# Patient Record
Sex: MALE | Race: WHITE | NOT HISPANIC OR LATINO | Employment: FULL TIME | URBAN - METROPOLITAN AREA
[De-identification: names, ages, dates, MRNs, and addresses within clinical notes are randomized per-mention and may not be internally consistent; named-entity substitution may affect disease eponyms.]

---

## 2017-12-26 ENCOUNTER — CLINICAL SUPPORT (OUTPATIENT)
Dept: OCCUPATIONAL MEDICINE | Facility: CLINIC | Age: 47
End: 2017-12-26

## 2017-12-26 DIAGNOSIS — Z00.00 PHYSICAL EXAM: Primary | ICD-10-CM

## 2017-12-26 LAB
BILIRUB UR QL STRIP: NORMAL
GLUCOSE UR QL STRIP: NORMAL
KETONES UR QL STRIP: NORMAL
LEUKOCYTE ESTERASE UR QL STRIP: NORMAL
PH, POC UA: NORMAL (ref 5–8)
POC BLOOD, URINE: NORMAL
POC NITRATES, URINE: NORMAL
PROT UR QL STRIP: NORMAL
SP GR UR STRIP: NORMAL (ref 1–1.03)
UROBILINOGEN UR STRIP-ACNC: NORMAL (ref 0.3–2.2)

## 2017-12-26 PROCEDURE — 99499 UNLISTED E&M SERVICE: CPT | Mod: S$GLB,,, | Performed by: PREVENTIVE MEDICINE

## 2018-09-11 ENCOUNTER — OFFICE VISIT (OUTPATIENT)
Dept: FAMILY MEDICINE | Facility: CLINIC | Age: 48
End: 2018-09-11
Payer: COMMERCIAL

## 2018-09-11 VITALS
BODY MASS INDEX: 28.73 KG/M2 | DIASTOLIC BLOOD PRESSURE: 90 MMHG | TEMPERATURE: 99 F | WEIGHT: 194 LBS | HEART RATE: 68 BPM | SYSTOLIC BLOOD PRESSURE: 148 MMHG | HEIGHT: 69 IN | OXYGEN SATURATION: 98 % | RESPIRATION RATE: 18 BRPM

## 2018-09-11 DIAGNOSIS — R03.0 ELEVATED BLOOD-PRESSURE READING WITHOUT DIAGNOSIS OF HYPERTENSION: ICD-10-CM

## 2018-09-11 DIAGNOSIS — J01.00 ACUTE NON-RECURRENT MAXILLARY SINUSITIS: Primary | ICD-10-CM

## 2018-09-11 PROCEDURE — 96372 THER/PROPH/DIAG INJ SC/IM: CPT | Mod: S$GLB,,, | Performed by: FAMILY MEDICINE

## 2018-09-11 PROCEDURE — 99999 PR PBB SHADOW E&M-EST. PATIENT-LVL IV: CPT | Mod: PBBFAC,,, | Performed by: FAMILY MEDICINE

## 2018-09-11 PROCEDURE — 3008F BODY MASS INDEX DOCD: CPT | Mod: CPTII,S$GLB,, | Performed by: FAMILY MEDICINE

## 2018-09-11 PROCEDURE — 99213 OFFICE O/P EST LOW 20 MIN: CPT | Mod: 25,S$GLB,, | Performed by: FAMILY MEDICINE

## 2018-09-11 RX ORDER — FLUTICASONE PROPIONATE 50 MCG
1 SPRAY, SUSPENSION (ML) NASAL DAILY
COMMUNITY
End: 2019-08-22

## 2018-09-11 RX ORDER — BETAMETHASONE SODIUM PHOSPHATE AND BETAMETHASONE ACETATE 3; 3 MG/ML; MG/ML
6 INJECTION, SUSPENSION INTRA-ARTICULAR; INTRALESIONAL; INTRAMUSCULAR; SOFT TISSUE
Status: COMPLETED | OUTPATIENT
Start: 2018-09-11 | End: 2018-09-11

## 2018-09-11 RX ORDER — AMOXICILLIN AND CLAVULANATE POTASSIUM 875; 125 MG/1; MG/1
1 TABLET, FILM COATED ORAL EVERY 12 HOURS
Qty: 14 TABLET | Refills: 0 | Status: SHIPPED | OUTPATIENT
Start: 2018-09-11 | End: 2018-09-18

## 2018-09-11 RX ADMIN — BETAMETHASONE SODIUM PHOSPHATE AND BETAMETHASONE ACETATE 6 MG: 3; 3 INJECTION, SUSPENSION INTRA-ARTICULAR; INTRALESIONAL; INTRAMUSCULAR; SOFT TISSUE at 03:09

## 2018-09-11 NOTE — PATIENT INSTRUCTIONS
1. Steroid injection today  2. Blood pressure was high  - normal is <120/70  - hypertension is >/=140/90\  3. Recommend wellness visit for labs and discussion on colon cancer screening

## 2018-09-11 NOTE — PROGRESS NOTES
FAMILY MEDICINE    Patient Active Problem List   Diagnosis    Elevated blood-pressure reading without diagnosis of hypertension       CC:   Chief Complaint   Patient presents with    URI       SUBJECTIVE:  Romeo Couch Jr.   is a 48 y.o. male  - healthy male presents with cough, congestion and sore throat x 1 week. Reports that he has chronic sinus issues and has followed with ENT in the past. Sore throat presented initially but now resolved. +Temp 100 F yesterday. Cough hacking nonproductive. Congestion and bilateral sinus pressure with yellow nasal discharge. Bilateral ear pressure without tinnitus or hearing loss.          ROS: Review of Systems   Constitutional: Positive for fever. Negative for activity change, appetite change, chills and fatigue.   HENT: Positive for congestion, ear pain, rhinorrhea, sinus pressure and sore throat. Negative for ear discharge, facial swelling, hearing loss, mouth sores, nosebleeds, tinnitus and trouble swallowing.    Respiratory: Positive for cough. Negative for chest tightness and shortness of breath.    Cardiovascular: Negative for chest pain and palpitations.   Gastrointestinal: Negative for abdominal pain, diarrhea and nausea.   Musculoskeletal: Negative for myalgias.   Skin: Negative for rash.   Neurological: Positive for headaches. Negative for dizziness and light-headedness.   Psychiatric/Behavioral: Positive for sleep disturbance.       Past Medical History:   Diagnosis Date    Pneumothorax     29 yo       Past Surgical History:   Procedure Laterality Date    right shoulder surgery      right rotator cuff       ALLERGIES: Review of patient's allergies indicates:  No Known Allergies    MEDS:   Current Outpatient Medications:     fluticasone (FLONASE) 50 mcg/actuation nasal spray, 1 spray by Each Nare route once daily., Disp: , Rfl:     amoxicillin-clavulanate 875-125mg (AUGMENTIN) 875-125 mg per tablet, Take 1 tablet by mouth every 12 (twelve) hours. for 7 days,  "Disp: 14 tablet, Rfl: 0  No current facility-administered medications for this visit.     OBJECTIVE:   Vitals:    09/11/18 1513 09/11/18 1555   BP: (!) 140/82 (!) 148/90   BP Location: Left arm    Patient Position: Sitting    BP Method: Large (Manual)    Pulse: 68    Resp: 18    Temp: 98.8 °F (37.1 °C)    SpO2: 98%    Weight: 88 kg (194 lb)    Height: 5' 9" (1.753 m)      Body mass index is 28.65 kg/m².    Physical Exam   Constitutional: No distress.   HENT:   Head: Normocephalic and atraumatic.   Right Ear: Ear canal normal. A middle ear effusion is present.   Left Ear: Tympanic membrane and ear canal normal.   Nose: Mucosal edema and rhinorrhea present. Right sinus exhibits maxillary sinus tenderness and frontal sinus tenderness. Left sinus exhibits maxillary sinus tenderness and frontal sinus tenderness.   Mouth/Throat: Uvula is midline, oropharynx is clear and moist and mucous membranes are normal.   Neck: Neck supple.   Cardiovascular: Normal rate, regular rhythm, normal heart sounds and intact distal pulses. Exam reveals no gallop and no friction rub.   No murmur heard.  Pulmonary/Chest: Effort normal and breath sounds normal. He has no wheezes. He has no rhonchi. He has no rales.   Skin: Skin is warm.         ASSESSMENT:  Problem List Items Addressed This Visit     Elevated blood-pressure reading without diagnosis of hypertension    Current Assessment & Plan     - no prior BP issues  - taking NSAID  - counseling on HTN and BP goals           Other Visit Diagnoses     Acute non-recurrent maxillary sinusitis    -  Primary    Relevant Medications    betamethasone acetate-betamethasone sodium phosphate injection 6 mg (Completed)    amoxicillin-clavulanate 875-125mg (AUGMENTIN) 875-125 mg per tablet          PLAN:   Orders Placed This Encounter    betamethasone acetate-betamethasone sodium phosphate injection 6 mg    amoxicillin-clavulanate 875-125mg (AUGMENTIN) 875-125 mg per tablet     Discussion with pt since " 49 yo. No recent labs or colon cancer screening. Recommend make appointment for wellness exam and discuss that due for Tdap, labs, and colon cancer screening.   Follow-up 1 month for labs and health maintenance recommendations.     Dr. Serenity Devlin D.O.   Family Medicine

## 2018-11-30 ENCOUNTER — OFFICE VISIT (OUTPATIENT)
Dept: FAMILY MEDICINE | Facility: CLINIC | Age: 48
End: 2018-11-30
Payer: COMMERCIAL

## 2018-11-30 VITALS
HEART RATE: 67 BPM | TEMPERATURE: 98 F | HEIGHT: 69 IN | DIASTOLIC BLOOD PRESSURE: 80 MMHG | OXYGEN SATURATION: 98 % | BODY MASS INDEX: 28.66 KG/M2 | WEIGHT: 193.5 LBS | SYSTOLIC BLOOD PRESSURE: 120 MMHG

## 2018-11-30 DIAGNOSIS — R05.9 COUGH: ICD-10-CM

## 2018-11-30 DIAGNOSIS — J30.2 SEASONAL ALLERGIC RHINITIS, UNSPECIFIED TRIGGER: ICD-10-CM

## 2018-11-30 DIAGNOSIS — R03.0 ELEVATED BLOOD PRESSURE READING: ICD-10-CM

## 2018-11-30 DIAGNOSIS — J01.00 ACUTE MAXILLARY SINUSITIS, RECURRENCE NOT SPECIFIED: ICD-10-CM

## 2018-11-30 DIAGNOSIS — R50.9 FEVER, UNSPECIFIED FEVER CAUSE: Primary | ICD-10-CM

## 2018-11-30 PROCEDURE — 3008F BODY MASS INDEX DOCD: CPT | Mod: CPTII,S$GLB,, | Performed by: INTERNAL MEDICINE

## 2018-11-30 PROCEDURE — 99999 PR PBB SHADOW E&M-EST. PATIENT-LVL III: CPT | Mod: PBBFAC,,, | Performed by: INTERNAL MEDICINE

## 2018-11-30 PROCEDURE — 99213 OFFICE O/P EST LOW 20 MIN: CPT | Mod: S$GLB,,, | Performed by: INTERNAL MEDICINE

## 2018-11-30 RX ORDER — AMOXICILLIN AND CLAVULANATE POTASSIUM 875; 125 MG/1; MG/1
1 TABLET, FILM COATED ORAL 2 TIMES DAILY
Qty: 20 TABLET | Refills: 0 | Status: SHIPPED | OUTPATIENT
Start: 2018-11-30 | End: 2018-12-10

## 2018-11-30 NOTE — PROGRESS NOTES
Subjective:       Patient ID: Romeo Couch Jr. is a 48 y.o. male.    Chief Complaint: Sore Throat; Cough; Headache; and Sinusitis     I have reviewed the PMH,  and  for this patient. This is a new patient to me. He is here for evaluation of cold symptoms including cough, sore throat and sinus headaches. He did not take a flu shot. He has been taking flonase once daily for allergies. He reports that he had a fever last night of 100.7. He had a normal temperature with us today. He reports that he gets a sinus infection and cough every year about this time. His symptoms started Tuesday. And have been getting worse.     He saw Dr. Devlin the last time he was here. He had an elevated blood pressure that day. He has been watching it since then and it has been OK. He had taken advil that day and he wonders if it could have raised his BP.       Sinus Problem   This is a new problem. The current episode started in the past 7 days. The problem has been gradually worsening since onset. The maximum temperature recorded prior to his arrival was 100.4 - 100.9 F. The fever has been present for 1 to 2 days. The pain is mild. Associated symptoms include congestion, a hoarse voice, sinus pressure and a sore throat. Pertinent negatives include no chills, coughing, diaphoresis, ear pain, headaches, neck pain, shortness of breath, sneezing or swollen glands. Past treatments include oral decongestants and spray decongestants. The treatment provided mild relief.     Active Ambulatory Problems     Diagnosis Date Noted    Elevated blood-pressure reading without diagnosis of hypertension 09/11/2018    Seasonal allergic rhinitis 11/30/2018    Elevated blood pressure reading 11/30/2018     Resolved Ambulatory Problems     Diagnosis Date Noted    Flu syndrome 12/20/2013    Cough with expectoration 12/20/2013    Acute sinusitis 12/20/2013     Past Medical History:   Diagnosis Date    Pneumothorax      Review of Systems    Constitutional: Negative for activity change, appetite change, chills, diaphoresis, fatigue and fever.   HENT: Positive for congestion, hoarse voice, sinus pressure and sore throat. Negative for drooling, ear discharge, ear pain, nosebleeds, postnasal drip, rhinorrhea, sinus pain, sneezing, trouble swallowing and voice change.    Eyes: Negative for pain, discharge, redness and itching.   Respiratory: Negative for cough, chest tightness, shortness of breath and wheezing.    Cardiovascular: Negative for chest pain and leg swelling.   Gastrointestinal: Negative for abdominal pain, constipation, diarrhea and nausea.   Musculoskeletal: Negative for arthralgias, joint swelling, myalgias and neck pain.   Skin: Negative for pallor and rash.   Neurological: Negative for dizziness, weakness, light-headedness and headaches.   Hematological: Negative for adenopathy. Does not bruise/bleed easily.   Psychiatric/Behavioral: Negative for agitation and sleep disturbance.       Objective:      Physical Exam   Constitutional: He is oriented to person, place, and time.   HENT:   Head: Normocephalic and atraumatic.   Right Ear: External ear normal. Tympanic membrane is not injected, not erythematous and not retracted. No middle ear effusion.   Left Ear: External ear normal. Tympanic membrane is not injected, not erythematous and not retracted.  No middle ear effusion.   Nose: Right sinus exhibits maxillary sinus tenderness. Right sinus exhibits no frontal sinus tenderness. Left sinus exhibits maxillary sinus tenderness. Left sinus exhibits no frontal sinus tenderness.   Mouth/Throat: Posterior oropharyngeal erythema present. No oropharyngeal exudate or posterior oropharyngeal edema.   Eyes: Conjunctivae and EOM are normal. Pupils are equal, round, and reactive to light. Right eye exhibits no discharge. Left eye exhibits no discharge.   Neck: Normal range of motion. Neck supple. No thyromegaly present.   Cardiovascular: Normal rate,  regular rhythm, normal heart sounds and intact distal pulses.   No murmur heard.  Pulmonary/Chest: Effort normal and breath sounds normal. No respiratory distress. He has no wheezes. He has no rales.   Abdominal: Soft. He exhibits no distension.   Musculoskeletal: He exhibits no edema or tenderness.   Lymphadenopathy:     He has cervical adenopathy.   Neurological: He is alert and oriented to person, place, and time.   Skin: Skin is warm and dry. No rash noted. No erythema.   Psychiatric: He has a normal mood and affect. His behavior is normal.       Assessment and Plan:     Problem List Items Addressed This Visit        Cardiac/Vascular    Elevated blood pressure reading - resolved. BP was normal x 2 today.        Other    Seasonal allergic rhinitis - continue flonase. Consider adding claritin or allegra.       Other Visit Diagnoses     Fever, unspecified fever cause    -  Primary - flu test was negative. Probably due to sinus infection.     Relevant Orders    POCT Influenza A/B    Acute maxillary sinusitis, recurrence not specified    - treat with augmentin       Cough    - due to sinus drainage. Continue flonase and treat sinus infection.

## 2018-11-30 NOTE — PATIENT INSTRUCTIONS
You have a sinus infection which is causing your fever. The flu test was negative. I have prescribed Augmentin to treat the infection. You can continue to take theraflu if it helps. Your BP was normal both times we took it today. Continue flonase for allergies and consider adding allegra or claritin.

## 2018-12-10 ENCOUNTER — PATIENT MESSAGE (OUTPATIENT)
Dept: FAMILY MEDICINE | Facility: CLINIC | Age: 48
End: 2018-12-10

## 2019-04-23 ENCOUNTER — OFFICE VISIT (OUTPATIENT)
Dept: INTERNAL MEDICINE | Facility: CLINIC | Age: 49
DRG: 842 | End: 2019-04-23
Payer: COMMERCIAL

## 2019-04-23 ENCOUNTER — DOCUMENTATION ONLY (OUTPATIENT)
Dept: FAMILY MEDICINE | Facility: CLINIC | Age: 49
End: 2019-04-23

## 2019-04-23 ENCOUNTER — HOSPITAL ENCOUNTER (OUTPATIENT)
Dept: RADIOLOGY | Facility: HOSPITAL | Age: 49
Discharge: HOME | End: 2019-04-23
Attending: FAMILY MEDICINE
Payer: COMMERCIAL

## 2019-04-23 VITALS
BODY MASS INDEX: 28.05 KG/M2 | WEIGHT: 189.38 LBS | HEIGHT: 69 IN | TEMPERATURE: 100 F | SYSTOLIC BLOOD PRESSURE: 151 MMHG | DIASTOLIC BLOOD PRESSURE: 62 MMHG | HEART RATE: 91 BPM

## 2019-04-23 DIAGNOSIS — R16.1 SPLENOMEGALY: ICD-10-CM

## 2019-04-23 DIAGNOSIS — Z87.09 HISTORY OF SORE THROAT: ICD-10-CM

## 2019-04-23 DIAGNOSIS — F17.200 SMOKER: ICD-10-CM

## 2019-04-23 DIAGNOSIS — R03.0 ELEVATED BLOOD-PRESSURE READING WITHOUT DIAGNOSIS OF HYPERTENSION: ICD-10-CM

## 2019-04-23 DIAGNOSIS — R10.9 FLANK PAIN: ICD-10-CM

## 2019-04-23 DIAGNOSIS — R50.9 FEVER, UNSPECIFIED FEVER CAUSE: ICD-10-CM

## 2019-04-23 DIAGNOSIS — R10.9 FLANK PAIN: Primary | ICD-10-CM

## 2019-04-23 PROCEDURE — 99215 PR OFFICE/OUTPT VISIT, EST, LEVL V, 40-54 MIN: ICD-10-PCS | Mod: S$GLB,,, | Performed by: FAMILY MEDICINE

## 2019-04-23 PROCEDURE — 74176 CT ABD & PELVIS W/O CONTRAST: CPT | Mod: TC

## 2019-04-23 PROCEDURE — 99215 OFFICE O/P EST HI 40 MIN: CPT | Mod: S$GLB,,, | Performed by: FAMILY MEDICINE

## 2019-04-23 PROCEDURE — 99999 PR PBB SHADOW E&M-EST. PATIENT-LVL III: CPT | Mod: PBBFAC,,, | Performed by: FAMILY MEDICINE

## 2019-04-23 PROCEDURE — 3008F PR BODY MASS INDEX (BMI) DOCUMENTED: ICD-10-PCS | Mod: CPTII,S$GLB,, | Performed by: FAMILY MEDICINE

## 2019-04-23 PROCEDURE — 3008F BODY MASS INDEX DOCD: CPT | Mod: CPTII,S$GLB,, | Performed by: FAMILY MEDICINE

## 2019-04-23 PROCEDURE — 74176 CT ABD & PELVIS W/O CONTRAST: CPT | Mod: 26,,, | Performed by: RADIOLOGY

## 2019-04-23 PROCEDURE — 74176 CT RENAL STONE STUDY ABD PELVIS WO: ICD-10-PCS | Mod: 26,,, | Performed by: RADIOLOGY

## 2019-04-23 PROCEDURE — 99999 PR PBB SHADOW E&M-EST. PATIENT-LVL III: ICD-10-PCS | Mod: PBBFAC,,, | Performed by: FAMILY MEDICINE

## 2019-04-23 RX ORDER — METRONIDAZOLE 500 MG/1
500 TABLET ORAL EVERY 8 HOURS
Qty: 30 TABLET | Refills: 0 | OUTPATIENT
Start: 2019-04-23 | End: 2019-04-24 | Stop reason: HOSPADM

## 2019-04-23 RX ORDER — TRAMADOL HYDROCHLORIDE 50 MG/1
50 TABLET ORAL EVERY 6 HOURS PRN
Qty: 28 TABLET | Refills: 0 | Status: SHIPPED | OUTPATIENT
Start: 2019-04-23 | End: 2019-08-22

## 2019-04-23 RX ORDER — ONDANSETRON 4 MG/1
8 TABLET, ORALLY DISINTEGRATING ORAL EVERY 6 HOURS PRN
Qty: 30 TABLET | Refills: 0 | Status: SHIPPED | OUTPATIENT
Start: 2019-04-23 | End: 2019-08-22

## 2019-04-23 RX ORDER — CIPROFLOXACIN 500 MG/1
500 TABLET ORAL EVERY 12 HOURS
Qty: 20 TABLET | Refills: 0 | OUTPATIENT
Start: 2019-04-23 | End: 2019-04-24 | Stop reason: HOSPADM

## 2019-04-24 ENCOUNTER — TELEPHONE (OUTPATIENT)
Dept: HEMATOLOGY/ONCOLOGY | Facility: CLINIC | Age: 49
End: 2019-04-24

## 2019-04-24 ENCOUNTER — HOSPITAL ENCOUNTER (EMERGENCY)
Facility: HOSPITAL | Age: 49
Discharge: HOME | DRG: 842 | End: 2019-04-24
Attending: EMERGENCY MEDICINE | Admitting: INTERNAL MEDICINE
Payer: COMMERCIAL

## 2019-04-24 VITALS
RESPIRATION RATE: 18 BRPM | OXYGEN SATURATION: 97 % | SYSTOLIC BLOOD PRESSURE: 119 MMHG | HEIGHT: 69 IN | HEART RATE: 70 BPM | BODY MASS INDEX: 27.85 KG/M2 | TEMPERATURE: 99 F | WEIGHT: 188 LBS | DIASTOLIC BLOOD PRESSURE: 73 MMHG

## 2019-04-24 DIAGNOSIS — D72.829 LEUKOCYTOSIS: ICD-10-CM

## 2019-04-24 DIAGNOSIS — D72.829 LEUKOCYTOSIS, UNSPECIFIED TYPE: Primary | ICD-10-CM

## 2019-04-24 DIAGNOSIS — R07.9 CHEST PAIN: ICD-10-CM

## 2019-04-24 DIAGNOSIS — R16.1 SPLENOMEGALY: ICD-10-CM

## 2019-04-24 PROBLEM — F17.200 SMOKER: Status: ACTIVE | Noted: 2019-04-24

## 2019-04-24 PROBLEM — R10.9 LEFT SIDED ABDOMINAL PAIN: Status: ACTIVE | Noted: 2019-04-24

## 2019-04-24 LAB
ABO + RH BLD: NORMAL
ALBUMIN SERPL BCP-MCNC: 3.9 G/DL (ref 3.5–5.2)
ALP SERPL-CCNC: 91 U/L (ref 55–135)
ALT SERPL W/O P-5'-P-CCNC: 29 U/L (ref 10–44)
ANION GAP SERPL CALC-SCNC: 11 MMOL/L (ref 8–16)
ANION GAP SERPL CALC-SCNC: 11 MMOL/L (ref 8–16)
ASCENDING AORTA: 2.91 CM
AST SERPL-CCNC: 33 U/L (ref 10–40)
AV INDEX (PROSTH): 0.84
AV MEAN GRADIENT: 7.41 MMHG
AV PEAK GRADIENT: 14.75 MMHG
AV VALVE AREA: 2.99 CM2
AV VELOCITY RATIO: 0.9
BILIRUB SERPL-MCNC: 0.9 MG/DL (ref 0.1–1)
BLD GP AB SCN CELLS X3 SERPL QL: NORMAL
BSA FOR ECHO PROCEDURE: 2.04 M2
BUN SERPL-MCNC: 9 MG/DL (ref 6–20)
BUN SERPL-MCNC: 9 MG/DL (ref 6–20)
CALCIUM SERPL-MCNC: 10 MG/DL (ref 8.7–10.5)
CALCIUM SERPL-MCNC: 10 MG/DL (ref 8.7–10.5)
CHLORIDE SERPL-SCNC: 104 MMOL/L (ref 95–110)
CHLORIDE SERPL-SCNC: 104 MMOL/L (ref 95–110)
CO2 SERPL-SCNC: 27 MMOL/L (ref 23–29)
CO2 SERPL-SCNC: 27 MMOL/L (ref 23–29)
CREAT SERPL-MCNC: 1.1 MG/DL (ref 0.5–1.4)
CREAT SERPL-MCNC: 1.1 MG/DL (ref 0.5–1.4)
CV ECHO LV RWT: 0.43 CM
DOP CALC AO PEAK VEL: 1.92 M/S
DOP CALC AO VTI: 35.07 CM
DOP CALC LVOT AREA: 3.56 CM2
DOP CALC LVOT DIAMETER: 2.13 CM
DOP CALC LVOT PEAK VEL: 1.72 M/S
DOP CALC LVOT STROKE VOLUME: 104.92 CM3
DOP CALCLVOT PEAK VEL VTI: 29.46 CM
E WAVE DECELERATION TIME: 281.28 MSEC
E/A RATIO: 1.35
E/E' RATIO: 7.46
ECHO LV POSTERIOR WALL: 1.09 CM (ref 0.6–1.1)
ERYTHROCYTE [DISTWIDTH] IN BLOOD BY AUTOMATED COUNT: 18.9 % (ref 11.5–14.5)
EST. GFR  (AFRICAN AMERICAN): >60 ML/MIN/1.73 M^2
EST. GFR  (AFRICAN AMERICAN): >60 ML/MIN/1.73 M^2
EST. GFR  (NON AFRICAN AMERICAN): >60 ML/MIN/1.73 M^2
EST. GFR  (NON AFRICAN AMERICAN): >60 ML/MIN/1.73 M^2
FRACTIONAL SHORTENING: 35 % (ref 28–44)
GLUCOSE SERPL-MCNC: 105 MG/DL (ref 70–110)
GLUCOSE SERPL-MCNC: 105 MG/DL (ref 70–110)
HAV IGM SERPL QL IA: NEGATIVE
HBV CORE AB SERPL QL IA: NEGATIVE
HBV CORE IGM SERPL QL IA: NEGATIVE
HBV SURFACE AG SERPL QL IA: NEGATIVE
HCT VFR BLD AUTO: 28.2 % (ref 40–54)
HCV AB SERPL QL IA: NEGATIVE
HGB BLD-MCNC: 9 G/DL (ref 14–18)
HIV1+2 IGG SERPL QL IA.RAPID: NEGATIVE
INTERVENTRICULAR SEPTUM: 0.81 CM (ref 0.6–1.1)
IVRT: 0.05 MSEC
LA MAJOR: 6.25 CM
LA MINOR: 6.42 CM
LA WIDTH: 5 CM
LACTATE SERPL-SCNC: 0.8 MMOL/L (ref 0.5–2.2)
LEFT ATRIUM SIZE: 3.48 CM
LEFT ATRIUM VOLUME INDEX: 46.6 ML/M2
LEFT ATRIUM VOLUME: 93.68 CM3
LEFT INTERNAL DIMENSION IN SYSTOLE: 3.28 CM (ref 2.1–4)
LEFT VENTRICLE DIASTOLIC VOLUME INDEX: 59.88 ML/M2
LEFT VENTRICLE DIASTOLIC VOLUME: 120.49 ML
LEFT VENTRICLE MASS INDEX: 85.6 G/M2
LEFT VENTRICLE SYSTOLIC VOLUME INDEX: 21.7 ML/M2
LEFT VENTRICLE SYSTOLIC VOLUME: 43.61 ML
LEFT VENTRICULAR INTERNAL DIMENSION IN DIASTOLE: 5.04 CM (ref 3.5–6)
LEFT VENTRICULAR MASS: 172.18 G
LV LATERAL E/E' RATIO: 6.93
LV SEPTAL E/E' RATIO: 8.08
MAGNESIUM SERPL-MCNC: 2.5 MG/DL (ref 1.6–2.6)
MCH RBC QN AUTO: 31.9 PG (ref 27–31)
MCHC RBC AUTO-ENTMCNC: 31.9 G/DL (ref 32–36)
MCV RBC AUTO: 100 FL (ref 82–98)
MV PEAK A VEL: 0.72 M/S
MV PEAK E VEL: 0.97 M/S
PISA TR MAX VEL: 3.06 M/S
PLATELET # BLD AUTO: 141 K/UL (ref 150–350)
PMV BLD AUTO: 9.8 FL (ref 9.2–12.9)
POTASSIUM SERPL-SCNC: 3.1 MMOL/L (ref 3.5–5.1)
POTASSIUM SERPL-SCNC: 3.1 MMOL/L (ref 3.5–5.1)
PROT SERPL-MCNC: 7.6 G/DL (ref 6–8.4)
PULM VEIN S/D RATIO: 1.08
PV PEAK D VEL: 0.78 M/S
PV PEAK S VEL: 0.84 M/S
PV PEAK VELOCITY: 1.39 CM/S
RA MAJOR: 5.75 CM
RA PRESSURE: 8 MMHG
RA WIDTH: 4.2 CM
RBC # BLD AUTO: 2.82 M/UL (ref 4.6–6.2)
RIGHT VENTRICULAR END-DIASTOLIC DIMENSION: 3.7 CM
RV TISSUE DOPPLER FREE WALL SYSTOLIC VELOCITY 1 (APICAL 4 CHAMBER VIEW): 22.21 M/S
SINUS: 3.34 CM
SODIUM SERPL-SCNC: 142 MMOL/L (ref 136–145)
SODIUM SERPL-SCNC: 142 MMOL/L (ref 136–145)
STJ: 2.53 CM
TDI LATERAL: 0.14
TDI SEPTAL: 0.12
TDI: 0.13
TR MAX PG: 37.45 MMHG
TRICUSPID ANNULAR PLANE SYSTOLIC EXCURSION: 3.47 CM
TV REST PULMONARY ARTERY PRESSURE: 45 MMHG
WBC # BLD AUTO: 200.8 K/UL (ref 3.9–12.7)

## 2019-04-24 PROCEDURE — 99223 PR INITIAL HOSPITAL CARE,LEVL III: ICD-10-PCS | Mod: ,,, | Performed by: INTERNAL MEDICINE

## 2019-04-24 PROCEDURE — 93010 EKG 12-LEAD: ICD-10-PCS | Mod: ,,, | Performed by: INTERNAL MEDICINE

## 2019-04-24 PROCEDURE — 86900 BLOOD TYPING SEROLOGIC ABO: CPT

## 2019-04-24 PROCEDURE — 80053 COMPREHEN METABOLIC PANEL: CPT

## 2019-04-24 PROCEDURE — 93010 ELECTROCARDIOGRAM REPORT: CPT | Mod: ,,, | Performed by: INTERNAL MEDICINE

## 2019-04-24 PROCEDURE — 99284 EMERGENCY DEPT VISIT MOD MDM: CPT | Mod: 25

## 2019-04-24 PROCEDURE — 96360 HYDRATION IV INFUSION INIT: CPT

## 2019-04-24 PROCEDURE — 83735 ASSAY OF MAGNESIUM: CPT

## 2019-04-24 PROCEDURE — 93005 ELECTROCARDIOGRAM TRACING: CPT

## 2019-04-24 PROCEDURE — 96361 HYDRATE IV INFUSION ADD-ON: CPT

## 2019-04-24 PROCEDURE — 82955 ASSAY OF G6PD ENZYME: CPT

## 2019-04-24 PROCEDURE — 86703 HIV-1/HIV-2 1 RESULT ANTBDY: CPT

## 2019-04-24 PROCEDURE — 83605 ASSAY OF LACTIC ACID: CPT

## 2019-04-24 PROCEDURE — 80074 ACUTE HEPATITIS PANEL: CPT

## 2019-04-24 PROCEDURE — 99223 1ST HOSP IP/OBS HIGH 75: CPT | Mod: ,,, | Performed by: INTERNAL MEDICINE

## 2019-04-24 PROCEDURE — 99285 EMERGENCY DEPT VISIT HI MDM: CPT | Mod: ,,, | Performed by: EMERGENCY MEDICINE

## 2019-04-24 PROCEDURE — 99285 PR EMERGENCY DEPT VISIT,LEVEL V: ICD-10-PCS | Mod: ,,, | Performed by: EMERGENCY MEDICINE

## 2019-04-24 PROCEDURE — 25000003 PHARM REV CODE 250: Performed by: EMERGENCY MEDICINE

## 2019-04-24 PROCEDURE — 86704 HEP B CORE ANTIBODY TOTAL: CPT

## 2019-04-24 PROCEDURE — 85027 COMPLETE CBC AUTOMATED: CPT

## 2019-04-24 PROCEDURE — 25000003 PHARM REV CODE 250: Performed by: NURSE PRACTITIONER

## 2019-04-24 PROCEDURE — 12000002 HC ACUTE/MED SURGE SEMI-PRIVATE ROOM

## 2019-04-24 RX ORDER — ALLOPURINOL 300 MG/1
300 TABLET ORAL DAILY
Qty: 30 TABLET | Refills: 3 | Status: SHIPPED | OUTPATIENT
Start: 2019-04-25 | End: 2019-04-24

## 2019-04-24 RX ORDER — TRAMADOL HYDROCHLORIDE 50 MG/1
50 TABLET ORAL EVERY 6 HOURS PRN
Status: DISCONTINUED | OUTPATIENT
Start: 2019-04-24 | End: 2019-04-24 | Stop reason: HOSPADM

## 2019-04-24 RX ORDER — HYDROXYUREA 500 MG/1
3000 CAPSULE ORAL DAILY
Status: DISCONTINUED | OUTPATIENT
Start: 2019-04-24 | End: 2019-04-24

## 2019-04-24 RX ORDER — HYDROXYUREA 500 MG/1
1000 CAPSULE ORAL DAILY
Status: DISCONTINUED | OUTPATIENT
Start: 2019-04-25 | End: 2019-04-24 | Stop reason: HOSPADM

## 2019-04-24 RX ORDER — SODIUM CHLORIDE 9 MG/ML
500 INJECTION, SOLUTION INTRAVENOUS
Status: COMPLETED | OUTPATIENT
Start: 2019-04-24 | End: 2019-04-24

## 2019-04-24 RX ORDER — SODIUM CHLORIDE 9 MG/ML
INJECTION, SOLUTION INTRAVENOUS CONTINUOUS
Status: DISCONTINUED | OUTPATIENT
Start: 2019-04-24 | End: 2019-04-24 | Stop reason: HOSPADM

## 2019-04-24 RX ORDER — ONDANSETRON 8 MG/1
8 TABLET, ORALLY DISINTEGRATING ORAL EVERY 6 HOURS PRN
Status: DISCONTINUED | OUTPATIENT
Start: 2019-04-24 | End: 2019-04-24 | Stop reason: HOSPADM

## 2019-04-24 RX ORDER — HYDROXYUREA 500 MG/1
1000 CAPSULE ORAL DAILY
Qty: 60 CAPSULE | Refills: 0 | Status: SHIPPED | OUTPATIENT
Start: 2019-04-25 | End: 2019-04-24

## 2019-04-24 RX ORDER — IBUPROFEN 200 MG
16 TABLET ORAL
Status: DISCONTINUED | OUTPATIENT
Start: 2019-04-24 | End: 2019-04-24 | Stop reason: HOSPADM

## 2019-04-24 RX ORDER — FLUTICASONE PROPIONATE 50 MCG
1 SPRAY, SUSPENSION (ML) NASAL DAILY
Status: DISCONTINUED | OUTPATIENT
Start: 2019-04-24 | End: 2019-04-24 | Stop reason: HOSPADM

## 2019-04-24 RX ORDER — GLUCAGON 1 MG
1 KIT INJECTION
Status: DISCONTINUED | OUTPATIENT
Start: 2019-04-24 | End: 2019-04-24 | Stop reason: HOSPADM

## 2019-04-24 RX ORDER — ALLOPURINOL 300 MG/1
300 TABLET ORAL DAILY
Qty: 30 TABLET | Refills: 3 | Status: SHIPPED | OUTPATIENT
Start: 2019-04-25 | End: 2019-06-11

## 2019-04-24 RX ORDER — ALLOPURINOL 100 MG/1
300 TABLET ORAL DAILY
Status: DISCONTINUED | OUTPATIENT
Start: 2019-04-24 | End: 2019-04-24 | Stop reason: HOSPADM

## 2019-04-24 RX ORDER — IBUPROFEN 200 MG
24 TABLET ORAL
Status: DISCONTINUED | OUTPATIENT
Start: 2019-04-24 | End: 2019-04-24 | Stop reason: HOSPADM

## 2019-04-24 RX ORDER — SODIUM CHLORIDE 0.9 % (FLUSH) 0.9 %
10 SYRINGE (ML) INJECTION
Status: DISCONTINUED | OUTPATIENT
Start: 2019-04-24 | End: 2019-04-24 | Stop reason: HOSPADM

## 2019-04-24 RX ORDER — HYDROXYUREA 500 MG/1
1000 CAPSULE ORAL DAILY
Qty: 60 CAPSULE | Refills: 3 | Status: SHIPPED | OUTPATIENT
Start: 2019-04-25 | End: 2019-06-11

## 2019-04-24 RX ORDER — LORAZEPAM 2 MG/ML
1 INJECTION INTRAMUSCULAR ONCE AS NEEDED
Status: DISCONTINUED | OUTPATIENT
Start: 2019-04-24 | End: 2019-04-24 | Stop reason: HOSPADM

## 2019-04-24 RX ORDER — POTASSIUM CHLORIDE 20 MEQ/1
40 TABLET, EXTENDED RELEASE ORAL ONCE
Status: COMPLETED | OUTPATIENT
Start: 2019-04-24 | End: 2019-04-24

## 2019-04-24 RX ADMIN — SODIUM CHLORIDE 500 ML: 0.9 INJECTION, SOLUTION INTRAVENOUS at 03:04

## 2019-04-24 RX ADMIN — SODIUM CHLORIDE 500 ML: 0.9 INJECTION, SOLUTION INTRAVENOUS at 07:04

## 2019-04-24 RX ADMIN — HYDROXYUREA 3000 MG: 500 CAPSULE ORAL at 11:04

## 2019-04-24 RX ADMIN — POTASSIUM CHLORIDE 40 MEQ: 1500 TABLET, EXTENDED RELEASE ORAL at 03:04

## 2019-04-24 RX ADMIN — ALLOPURINOL 300 MG: 100 TABLET ORAL at 10:04

## 2019-04-24 RX ADMIN — SODIUM CHLORIDE: 0.9 INJECTION, SOLUTION INTRAVENOUS at 09:04

## 2019-04-24 NOTE — H&P (VIEW-ONLY)
[facility]  Hematology  Bone Marrow Transplant  H&P    Subjective:     Principal Problem: <principal problem not specified>    HPI: Mr. Couch is a 49-y-o male patient who presented to [facility] ED over night. He reported that he went to his PCP with left abdominal pain and temp of 100.4 yesterday and was sent by PCP for CT. CT showed splenomegaly. Pt was instructed to go to ED. Went to [place] ED and was found to have leukocytosis. Wanted to transfer patient to [facility], but no beds were available. Patient reportedly left [place] ED AMA and came to [facility] ED over night. WBC 238K. Suspicious for acute leukemia. He denies any aches, chills, n/v/d, constipation, chest pain, bleeding or bruising. C/o SOB on exertion and mild, non-productive cough. Patient states that he has lost 8 lbs in the last few weeks. Further states that he has had recurrent ear infection over the last few weeks. States that they would respond to abx but would recur upon abx completion. Will admit inpatient and plan for BMBx when patient arrives to floor.     Patient information was obtained from patient, spouse/SO and ER records.     Oncology History: WBC 238K. Suspicious for acute leukemia. Will plan for BMBx.       (Not in a hospital admission)    Patient has no known allergies.     Past Medical History:   Diagnosis Date    Leukocytosis     Pneumothorax     27 yo     Past Surgical History:   Procedure Laterality Date    right shoulder surgery      right rotator cuff     Family History     Problem Relation (Age of Onset)    Cancer Mother        Tobacco Use    Smoking status: Never Smoker    Smokeless tobacco: Current User     Types: Snuff   Substance and Sexual Activity    Alcohol use: Yes    Drug use: No    Sexual activity: Yes     Partners: Female       Review of Systems   Constitutional: Positive for fever. Negative for activity change, appetite change, chills and fatigue.   HENT: Negative for congestion, mouth sores,  nosebleeds, rhinorrhea, sinus pressure, sinus pain, sneezing and sore throat.    Eyes: Negative for photophobia, pain, discharge, redness, itching and visual disturbance.   Respiratory: Positive for cough and shortness of breath. Negative for chest tightness and wheezing.         Reports non-productive cough and SOB on exertion   Cardiovascular: Negative for chest pain, palpitations and leg swelling.   Gastrointestinal: Negative for abdominal distention, abdominal pain, blood in stool, constipation, diarrhea, nausea and vomiting.   Endocrine: Negative for cold intolerance, heat intolerance, polydipsia, polyphagia and polyuria.   Genitourinary: Negative for difficulty urinating, frequency, hematuria and urgency.   Musculoskeletal: Negative for arthralgias, back pain, myalgias, neck pain and neck stiffness.   Skin: Negative for pallor, rash and wound.   Allergic/Immunologic: Negative for environmental allergies, food allergies and immunocompromised state.   Neurological: Negative for dizziness, tremors, seizures, syncope, speech difficulty, weakness, light-headedness, numbness and headaches.   Hematological: Negative for adenopathy. Does not bruise/bleed easily.   Psychiatric/Behavioral: Negative for agitation, confusion, hallucinations, sleep disturbance and suicidal ideas. The patient is not nervous/anxious.      Objective:     Vital Signs (Most Recent):  Temp: 98.9 °F (37.2 °C) (04/24/19 0825)  Pulse: 78 (04/24/19 0825)  Resp: 18 (04/24/19 0825)  BP: (!) 149/79 (04/24/19 0825)  SpO2: 99 % (04/24/19 0825) Vital Signs (24h Range):  Temp:  [98.3 °F (36.8 °C)-100.4 °F (38 °C)] 98.9 °F (37.2 °C)  Pulse:  [78-98] 78  Resp:  [16-18] 18  SpO2:  [99 %-100 %] 99 %  BP: (142-151)/(62-83) 149/79     Weight: 85.3 kg (188 lb)  Body mass index is 27.76 kg/m².  Body surface area is 2.04 meters squared.    ECOG SCORE         [unfilled]    Lines/Drains/Airways     Peripheral Intravenous Line                 Peripheral IV - Single  Lumen 04/24/19 0659 18 G Left Antecubital less than 1 day                Physical Exam   Constitutional: He is oriented to person, place, and time. He appears well-developed and well-nourished.   HENT:   Head: Normocephalic and atraumatic.   Mouth/Throat: No oropharyngeal exudate.   Eyes: Pupils are equal, round, and reactive to light. Conjunctivae are normal. Right eye exhibits no discharge. Left eye exhibits no discharge.   Neck: Normal range of motion. Neck supple.   Cardiovascular: Normal rate, regular rhythm, normal heart sounds and intact distal pulses.   No murmur heard.  Pulmonary/Chest: Effort normal and breath sounds normal. No respiratory distress. He has no wheezes. He has no rales.   Abdominal: Soft. Bowel sounds are normal. He exhibits no distension. There is no tenderness.   Musculoskeletal: Normal range of motion. He exhibits no edema or deformity.   Neurological: He is alert and oriented to person, place, and time.   Skin: Skin is warm and dry. No rash noted. No erythema.   Psychiatric: He has a normal mood and affect. His behavior is normal. Judgment and thought content normal.       Significant Labs:   CBC:   Recent Labs   Lab 04/23/19  1513 04/23/19  2305   .70* 238.80*   HGB 9.3* 9.6*   HCT 30.8* 30.1*   * 152    and CMP:   Recent Labs   Lab 04/23/19  1513 04/23/19  2305 04/24/19  0657    141 142  142   K 4.0 3.3* 3.1*  3.1*    101 104  104   CO2 29 29 27  27   GLU 77 107 105  105   BUN 10 12 9  9   CREATININE 1.1 1.11 1.1  1.1   CALCIUM 10.0 9.7 10.0  10.0   PROT 7.5 8.0 7.6   ALBUMIN 4.1 4.5 3.9   BILITOT 0.8 0.7 0.9   ALKPHOS 89 82 91   AST 40 47* 33   ALT 32 37 29   ANIONGAP 9 11 11  11   EGFRNONAA >60.0 >60.0 >60.0  >60.0       Diagnostic Results:  I have reviewed all pertinent imaging results/findings within the past 24 hours.    Assessment/Plan:     Left sided abdominal pain  - saw PCP for left sided abdominal pain on 4/23/19. CT performed and showed  splenomegaly    Leukocytosis  - was sent to [place] ED 4/23/19 due to splenomegaly on CT. WBC found to be 220K.  - WBC 238K upon presentation to [facility] ED  - started on IVF, hydrea 3000 mg BID, and allopurinol 300 mg daily  - will check TLS and DIC labs BID  - planning BMBx once patient is in room (currently in bautista bed in ED)  - will check flow cytometry via peripheral blood   - will check echo, Hep panel, rapid HIV, and G6PD        VTE Risk Mitigation (From admission, onward)        Ordered     IP VTE LOW RISK PATIENT  Once      04/24/19 0846     Place EDUARDO hose  Until discontinued      04/24/19 0847     Place sequential compression device  Until discontinued      04/24/19 0847          Disposition: Inpatient    Carmen Thapa, NP  Bone Marrow Transplant  Hematology  [facility]

## 2019-04-24 NOTE — HOSPITAL COURSE
04/24/2019: Patient presented to ED with c/o left abdominal pain (splenomegaly on CT). WBC 238K. Suspicious for leukemia. Checked flow and suggestive of chronic leukemia. Will discharge and plan for BMBx in OR tomorrow and clinic follow-up with Dr. Rucker.

## 2019-04-24 NOTE — PROGRESS NOTES
Internal Medicine On-Call Note:    I received a call from the lab regarding a critical value for WBC of 220.  Chart review shows that he was seen in clinic today for fever and flank pain and CT showed splenomegaly.  H/H and plat lets down as well.      Patient still having belly pain.  Feeling fatigued.  No CP, SOB, palpitations.  The diff is not back nor is his CMP with regard for TLS evaluation.      I asked the patient to go to the [facility] ER since he lives in that area for evaluation for IVF and ensuring that he is not in fact having TLS given this marked leukocytosis.  He expressed evaluation.     I called and gave report to the ER physician.  Patient will likely need LDH, uric acid drawn as well to complete his TLS evaluation.     Moreno Beaulieu

## 2019-04-24 NOTE — SUBJECTIVE & OBJECTIVE
Patient information was obtained from patient, spouse/SO and ER records.     Oncology History: WBC 238K. Suspicious for acute leukemia. Will plan for BMBx.       (Not in a hospital admission)    Patient has no known allergies.     Past Medical History:   Diagnosis Date    Leukocytosis     Pneumothorax     27 yo     Past Surgical History:   Procedure Laterality Date    right shoulder surgery      right rotator cuff     Family History     Problem Relation (Age of Onset)    Cancer Mother        Tobacco Use    Smoking status: Never Smoker    Smokeless tobacco: Current User     Types: Snuff   Substance and Sexual Activity    Alcohol use: Yes    Drug use: No    Sexual activity: Yes     Partners: Female       Review of Systems   Constitutional: Positive for fever. Negative for activity change, appetite change, chills and fatigue.   HENT: Negative for congestion, mouth sores, nosebleeds, rhinorrhea, sinus pressure, sinus pain, sneezing and sore throat.    Eyes: Negative for photophobia, pain, discharge, redness, itching and visual disturbance.   Respiratory: Positive for cough and shortness of breath. Negative for chest tightness and wheezing.         Reports non-productive cough and SOB on exertion   Cardiovascular: Negative for chest pain, palpitations and leg swelling.   Gastrointestinal: Negative for abdominal distention, abdominal pain, blood in stool, constipation, diarrhea, nausea and vomiting.   Endocrine: Negative for cold intolerance, heat intolerance, polydipsia, polyphagia and polyuria.   Genitourinary: Negative for difficulty urinating, frequency, hematuria and urgency.   Musculoskeletal: Negative for arthralgias, back pain, myalgias, neck pain and neck stiffness.   Skin: Negative for pallor, rash and wound.   Allergic/Immunologic: Negative for environmental allergies, food allergies and immunocompromised state.   Neurological: Negative for dizziness, tremors, seizures, syncope, speech difficulty,  weakness, light-headedness, numbness and headaches.   Hematological: Negative for adenopathy. Does not bruise/bleed easily.   Psychiatric/Behavioral: Negative for agitation, confusion, hallucinations, sleep disturbance and suicidal ideas. The patient is not nervous/anxious.      Objective:     Vital Signs (Most Recent):  Temp: 98.9 °F (37.2 °C) (04/24/19 0825)  Pulse: 78 (04/24/19 0825)  Resp: 18 (04/24/19 0825)  BP: (!) 149/79 (04/24/19 0825)  SpO2: 99 % (04/24/19 0825) Vital Signs (24h Range):  Temp:  [98.3 °F (36.8 °C)-100.4 °F (38 °C)] 98.9 °F (37.2 °C)  Pulse:  [78-98] 78  Resp:  [16-18] 18  SpO2:  [99 %-100 %] 99 %  BP: (142-151)/(62-83) 149/79     Weight: 85.3 kg (188 lb)  Body mass index is 27.76 kg/m².  Body surface area is 2.04 meters squared.    ECOG SCORE         [unfilled]    Lines/Drains/Airways     Peripheral Intravenous Line                 Peripheral IV - Single Lumen 04/24/19 0659 18 G Left Antecubital less than 1 day                Physical Exam   Constitutional: He is oriented to person, place, and time. He appears well-developed and well-nourished.   HENT:   Head: Normocephalic and atraumatic.   Mouth/Throat: No oropharyngeal exudate.   Eyes: Pupils are equal, round, and reactive to light. Conjunctivae are normal. Right eye exhibits no discharge. Left eye exhibits no discharge.   Neck: Normal range of motion. Neck supple.   Cardiovascular: Normal rate, regular rhythm, normal heart sounds and intact distal pulses.   No murmur heard.  Pulmonary/Chest: Effort normal and breath sounds normal. No respiratory distress. He has no wheezes. He has no rales.   Abdominal: Soft. Bowel sounds are normal. He exhibits no distension. There is no tenderness.   Musculoskeletal: Normal range of motion. He exhibits no edema or deformity.   Neurological: He is alert and oriented to person, place, and time.   Skin: Skin is warm and dry. No rash noted. No erythema.   Psychiatric: He has a normal mood and affect. His  behavior is normal. Judgment and thought content normal.       Significant Labs:   CBC:   Recent Labs   Lab 04/23/19  1513 04/23/19  2305   .70* 238.80*   HGB 9.3* 9.6*   HCT 30.8* 30.1*   * 152    and CMP:   Recent Labs   Lab 04/23/19  1513 04/23/19  2305 04/24/19  0657    141 142  142   K 4.0 3.3* 3.1*  3.1*    101 104  104   CO2 29 29 27  27   GLU 77 107 105  105   BUN 10 12 9  9   CREATININE 1.1 1.11 1.1  1.1   CALCIUM 10.0 9.7 10.0  10.0   PROT 7.5 8.0 7.6   ALBUMIN 4.1 4.5 3.9   BILITOT 0.8 0.7 0.9   ALKPHOS 89 82 91   AST 40 47* 33   ALT 32 37 29   ANIONGAP 9 11 11  11   EGFRNONAA >60.0 >60.0 >60.0  >60.0       Diagnostic Results:  I have reviewed all pertinent imaging results/findings within the past 24 hours.

## 2019-04-24 NOTE — ED NOTES
Oncology bedside discussing POC; SHERIE, bone biopsy and labs.  Pt remains NPO with IV fluids runnning

## 2019-04-24 NOTE — H&P
[facility]  Hematology  Bone Marrow Transplant  H&P    Subjective:     Principal Problem: <principal problem not specified>    HPI: Mr. Couch is a 49-y-o male patient who presented to [facility] ED over night. He reported that he went to his PCP with left abdominal pain and temp of 100.4 yesterday and was sent by PCP for CT. CT showed splenomegaly. Pt was instructed to go to ED. Went to [place] ED and was found to have leukocytosis. Wanted to transfer patient to [facility], but no beds were available. Patient reportedly left [place] ED AMA and came to [facility] ED over night. WBC 238K. Suspicious for acute leukemia. He denies any aches, chills, n/v/d, constipation, chest pain, bleeding or bruising. C/o SOB on exertion and mild, non-productive cough. Patient states that he has lost 8 lbs in the last few weeks. Further states that he has had recurrent ear infection over the last few weeks. States that they would respond to abx but would recur upon abx completion. Will admit inpatient and plan for BMBx when patient arrives to floor.     Patient information was obtained from patient, spouse/SO and ER records.     Oncology History: WBC 238K. Suspicious for acute leukemia. Will plan for BMBx.       (Not in a hospital admission)    Patient has no known allergies.     Past Medical History:   Diagnosis Date    Leukocytosis     Pneumothorax     29 yo     Past Surgical History:   Procedure Laterality Date    right shoulder surgery      right rotator cuff     Family History     Problem Relation (Age of Onset)    Cancer Mother        Tobacco Use    Smoking status: Never Smoker    Smokeless tobacco: Current User     Types: Snuff   Substance and Sexual Activity    Alcohol use: Yes    Drug use: No    Sexual activity: Yes     Partners: Female       Review of Systems   Constitutional: Positive for fever. Negative for activity change, appetite change, chills and fatigue.   HENT: Negative for congestion, mouth sores,  nosebleeds, rhinorrhea, sinus pressure, sinus pain, sneezing and sore throat.    Eyes: Negative for photophobia, pain, discharge, redness, itching and visual disturbance.   Respiratory: Positive for cough and shortness of breath. Negative for chest tightness and wheezing.         Reports non-productive cough and SOB on exertion   Cardiovascular: Negative for chest pain, palpitations and leg swelling.   Gastrointestinal: Negative for abdominal distention, abdominal pain, blood in stool, constipation, diarrhea, nausea and vomiting.   Endocrine: Negative for cold intolerance, heat intolerance, polydipsia, polyphagia and polyuria.   Genitourinary: Negative for difficulty urinating, frequency, hematuria and urgency.   Musculoskeletal: Negative for arthralgias, back pain, myalgias, neck pain and neck stiffness.   Skin: Negative for pallor, rash and wound.   Allergic/Immunologic: Negative for environmental allergies, food allergies and immunocompromised state.   Neurological: Negative for dizziness, tremors, seizures, syncope, speech difficulty, weakness, light-headedness, numbness and headaches.   Hematological: Negative for adenopathy. Does not bruise/bleed easily.   Psychiatric/Behavioral: Negative for agitation, confusion, hallucinations, sleep disturbance and suicidal ideas. The patient is not nervous/anxious.      Objective:     Vital Signs (Most Recent):  Temp: 98.9 °F (37.2 °C) (04/24/19 0825)  Pulse: 78 (04/24/19 0825)  Resp: 18 (04/24/19 0825)  BP: (!) 149/79 (04/24/19 0825)  SpO2: 99 % (04/24/19 0825) Vital Signs (24h Range):  Temp:  [98.3 °F (36.8 °C)-100.4 °F (38 °C)] 98.9 °F (37.2 °C)  Pulse:  [78-98] 78  Resp:  [16-18] 18  SpO2:  [99 %-100 %] 99 %  BP: (142-151)/(62-83) 149/79     Weight: 85.3 kg (188 lb)  Body mass index is 27.76 kg/m².  Body surface area is 2.04 meters squared.    ECOG SCORE         [unfilled]    Lines/Drains/Airways     Peripheral Intravenous Line                 Peripheral IV - Single  Lumen 04/24/19 0659 18 G Left Antecubital less than 1 day                Physical Exam   Constitutional: He is oriented to person, place, and time. He appears well-developed and well-nourished.   HENT:   Head: Normocephalic and atraumatic.   Mouth/Throat: No oropharyngeal exudate.   Eyes: Pupils are equal, round, and reactive to light. Conjunctivae are normal. Right eye exhibits no discharge. Left eye exhibits no discharge.   Neck: Normal range of motion. Neck supple.   Cardiovascular: Normal rate, regular rhythm, normal heart sounds and intact distal pulses.   No murmur heard.  Pulmonary/Chest: Effort normal and breath sounds normal. No respiratory distress. He has no wheezes. He has no rales.   Abdominal: Soft. Bowel sounds are normal. He exhibits no distension. There is no tenderness.   Musculoskeletal: Normal range of motion. He exhibits no edema or deformity.   Neurological: He is alert and oriented to person, place, and time.   Skin: Skin is warm and dry. No rash noted. No erythema.   Psychiatric: He has a normal mood and affect. His behavior is normal. Judgment and thought content normal.       Significant Labs:   CBC:   Recent Labs   Lab 04/23/19  1513 04/23/19  2305   .70* 238.80*   HGB 9.3* 9.6*   HCT 30.8* 30.1*   * 152    and CMP:   Recent Labs   Lab 04/23/19  1513 04/23/19  2305 04/24/19  0657    141 142  142   K 4.0 3.3* 3.1*  3.1*    101 104  104   CO2 29 29 27  27   GLU 77 107 105  105   BUN 10 12 9  9   CREATININE 1.1 1.11 1.1  1.1   CALCIUM 10.0 9.7 10.0  10.0   PROT 7.5 8.0 7.6   ALBUMIN 4.1 4.5 3.9   BILITOT 0.8 0.7 0.9   ALKPHOS 89 82 91   AST 40 47* 33   ALT 32 37 29   ANIONGAP 9 11 11  11   EGFRNONAA >60.0 >60.0 >60.0  >60.0       Diagnostic Results:  I have reviewed all pertinent imaging results/findings within the past 24 hours.    Assessment/Plan:     Left sided abdominal pain  - saw PCP for left sided abdominal pain on 4/23/19. CT performed and showed  splenomegaly    Leukocytosis  - was sent to [place] ED 4/23/19 due to splenomegaly on CT. WBC found to be 220K.  - WBC 238K upon presentation to [facility] ED  - started on IVF, hydrea 3000 mg BID, and allopurinol 300 mg daily  - will check TLS and DIC labs BID  - planning BMBx once patient is in room (currently in bautista bed in ED)  - will check flow cytometry via peripheral blood   - will check echo, Hep panel, rapid HIV, and G6PD        VTE Risk Mitigation (From admission, onward)        Ordered     IP VTE LOW RISK PATIENT  Once      04/24/19 0846     Place EDUARDO hose  Until discontinued      04/24/19 0847     Place sequential compression device  Until discontinued      04/24/19 0847          Disposition: Inpatient    Carmen Thapa, NP  Bone Marrow Transplant  Hematology  [facility]

## 2019-04-24 NOTE — ED TRIAGE NOTES
Wife at bedside.      Pain:  pt denies any pain at this time.      Psychosocial:  Patient is calm and cooperative.  Patients insight and judgement are appropriate to situation.  Appears clean, well maintained, with clothing appropriate to environment.  No evidence of delusions, hallucinations, or psychosis.     Neuro:  Eyes open spontaneously.  Awake, alert, oriented x 4.  Speech clear and appropriate.  Tolerating saliva secretions well.  Able to follow commands, demonstrating ability to actively and appropriately communicate within context of current conversation.  Symmetrical facial muscles.  Moving all extremities well with no noted weakness.  Adequate muscle tone present.    Movement is purposeful.       Airway:  Bilateral chest rise and fall.  RR regular and non-labored.  Air entry patent with and clear x 5 lobes of the lungs.  No crepitus or subcutaneous emphysema noted on palpation.       Circulatory:  Skin warm, dry, and pink.  Apical and radial pulses strong and regular.  Capillary refill/skin blanching less than 3 seconds to distal of 4 extremities.     Abdomen:  Abdomen soft and non-distended.  Positive normo-active bowel sounds x 4 quadrants.       Urinary: Denies pressure, frequency, urgency, odor or pain.     Extremities:  No redness, heat, deformity, or pain.     Skin:  Intact with no bruising/discolorations noted.

## 2019-04-24 NOTE — ASSESSMENT & PLAN NOTE
- was sent to [place] ED 4/23/19 due to splenomegaly on CT. WBC found to be 220K.  - WBC 238K upon presentation to [facility] ED  - started on IVF, hydrea 3000 mg BID, and allopurinol 300 mg daily  - will check TLS and DIC labs BID  - planning BMBx once patient is in room (currently in bautista bed in ED)  - will check flow cytometry via peripheral blood   - will check echo, Hep panel, rapid HIV, and G6PD

## 2019-04-24 NOTE — HPI
Mr. Couch is a 49-y-o male patient who presented to [facility] ED over night. He reported that he went to his PCP with left abdominal pain and temp of 100.4 yesterday and was sent by PCP for CT. CT showed splenomegaly. Pt was instructed to go to ED. Went to [place] ED and was found to have leukocytosis. Wanted to transfer patient to [facility], but no beds were available. Patient reportedly left [place] ED AMA and came to [facility] ED over night. WBC 238K. Suspicious for acute leukemia. He denies any aches, chills, n/v/d, constipation, chest pain, bleeding or bruising. C/o SOB on exertion and mild, non-productive cough. Patient states that he has lost 8 lbs in the last few weeks. Further states that he has had recurrent ear infection over the last few weeks. States that they would respond to abx but would recur upon abx completion. Will admit inpatient and plan for BMBx when patient arrives to floor.

## 2019-04-24 NOTE — PROGRESS NOTES
Subjective:   Patient ID: Romeo Couch Jr. is a 49 y.o. male.    Chief Complaint: Abdominal Pain (left side) and Shoulder Pain (left)      HPI  48 yo with sudden onset of LUQ pain and left shoulder pain. Has 100.4 temp today but denies sensation of fevers or chills. No headache. No sore throat today but had uri symptoms inc a sore throat about a week today. 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 Denies nausea, vomiting or diarrhea. Denies chest pain or dyspnea or abd pain.  No ho mono. No ho cancer.    Patient queried and denies any further complaints.        ALLERGIES AND MEDICATIONS: updated and reviewed.  Review of patient's allergies indicates:  No Known Allergies  No current facility-administered medications for this visit.     Current Outpatient Medications:     ciprofloxacin HCl (CIPRO) 500 MG tablet, Take 1 tablet (500 mg total) by mouth every 12 (twelve) hours., Disp: 20 tablet, Rfl: 0    fluticasone (FLONASE) 50 mcg/actuation nasal spray, 1 spray by Each Nare route once daily., Disp: , Rfl:     metroNIDAZOLE (FLAGYL) 500 MG tablet, Take 1 tablet (500 mg total) by mouth every 8 (eight) hours., Disp: 30 tablet, Rfl: 0    ondansetron (ZOFRAN-ODT) 4 MG TbDL, Dissolve 2 tablets (8 mg total) by mouth every 6 (six) hours as needed., Disp: 30 tablet, Rfl: 0    traMADol (ULTRAM) 50 mg tablet, Take 1 tablet (50 mg total) by mouth every 6 (six) hours as needed for Pain., Disp: 28 tablet, Rfl: 0    Facility-Administered Medications Ordered in Other Visits:     0.9%  NaCl infusion, , Intravenous, Continuous, Carmen Thapa NP, Last Rate: 100 mL/hr at 04/24/19 0949    allopurinol tablet 300 mg, 300 mg, Oral, Daily, Carmen Thapa NP, 300 mg at 04/24/19 1026    dextrose 50% injection 12.5 g, 12.5 g, Intravenous, PRN, Carmen Thapa NP    dextrose 50% injection 25 g, 25 g, Intravenous, PRN, Carmen A. Alanis, NP    fluticasone 50 mcg/actuation nasal  spray 50 mcg, 1 spray, Each Nare, Daily, Carmen Thapa NP    glucagon (human recombinant) injection 1 mg, 1 mg, Intramuscular, PRN, Carmen Thapa NP    glucose chewable tablet 16 g, 16 g, Oral, PRN, Carmen Thapa NP    glucose chewable tablet 24 g, 24 g, Oral, PRN, Carmen Thapa NP    hydroxyurea capsule 3,000 mg, 3,000 mg, Oral, Daily, Carmen Thapa NP    ondansetron disintegrating tablet 8 mg, 8 mg, Oral, Q6H PRN, Carmen Thapa NP    sodium chloride 0.9% flush 10 mL, 10 mL, Intravenous, PRN, Carmen Thapa NP    traMADol tablet 50 mg, 50 mg, Oral, Q6H PRN, Carmen Thapa NP    Review of Systems   Constitutional: Positive for fatigue. Negative for activity change, appetite change, chills, diaphoresis, fever and unexpected weight change.   HENT: Positive for sore throat (none today). Negative for congestion, dental problem, drooling, ear discharge, ear pain, facial swelling, hearing loss, mouth sores, nosebleeds, postnasal drip, rhinorrhea, sinus pressure, sinus pain, sneezing, tinnitus, trouble swallowing and voice change.    Eyes: Negative for photophobia, pain, discharge, redness, itching and visual disturbance.   Respiratory: Negative for cough, chest tightness, shortness of breath and wheezing.    Cardiovascular: Negative for chest pain, palpitations and leg swelling.   Gastrointestinal: Negative for abdominal distention, abdominal pain, anal bleeding, blood in stool, constipation, diarrhea, nausea, rectal pain and vomiting.   Endocrine: Negative for cold intolerance, heat intolerance, polydipsia, polyphagia and polyuria.   Genitourinary: Negative for difficulty urinating, dysuria and flank pain.   Musculoskeletal: Negative for arthralgias, back pain, joint swelling, myalgias and neck pain.   Skin: Negative for rash.   Neurological: Negative for dizziness, tremors, seizures, syncope, speech difficulty, weakness, light-headedness, numbness and headaches.   Psychiatric/Behavioral: Negative  "for behavioral problems, confusion, decreased concentration, dysphoric mood, sleep disturbance and suicidal ideas. The patient is not nervous/anxious and is not hyperactive.        Objective:     Vitals:    04/23/19 1416   BP: (!) 151/62   Pulse: 91   Temp: (!) 100.4 °F (38 °C)   TempSrc: Oral   Weight: 85.9 kg (189 lb 6 oz)   Height: 5' 9" (1.753 m)   PainSc:   8     Body mass index is 27.97 kg/m².    Physical Exam   Constitutional: He is oriented to person, place, and time. He appears well-developed and well-nourished. He is cooperative. He does not have a sickly appearance. No distress.   HENT:   Head: Normocephalic and atraumatic.   Right Ear: Hearing, tympanic membrane, external ear and ear canal normal. No tenderness.   Left Ear: Hearing, tympanic membrane, external ear and ear canal normal. No tenderness.   Nose: Nose normal.   Mouth/Throat: Oropharynx is clear and moist.   Eyes: Pupils are equal, round, and reactive to light. Conjunctivae and lids are normal. Right eye exhibits no discharge. Left eye exhibits no discharge. Right conjunctiva is not injected. Left conjunctiva is not injected. No scleral icterus. Right eye exhibits normal extraocular motion. Left eye exhibits normal extraocular motion.   Neck: Normal range of motion. Neck supple. No JVD present. Carotid bruit is not present. No tracheal deviation and no edema present. No thyromegaly present.   Cardiovascular: Normal rate, regular rhythm, normal heart sounds and normal pulses. Exam reveals no friction rub.   No murmur heard.  Pulmonary/Chest: Effort normal and breath sounds normal. No accessory muscle usage. No respiratory distress. He has no wheezes. He has no rhonchi. He has no rales.   Abdominal: Soft. Bowel sounds are normal. He exhibits no distension, no abdominal bruit, no ascites, no pulsatile midline mass and no mass. There is splenomegaly. There is no hepatomegaly. There is no tenderness. There is no rebound, no guarding, no CVA " tenderness, no tenderness at McBurney's point and negative Andres's sign. No hernia.       Musculoskeletal: He exhibits no edema.   Lymphadenopathy:        Head (right side): No submandibular, no preauricular and no posterior auricular adenopathy present.        Head (left side): No submandibular, no preauricular and no posterior auricular adenopathy present.     He has no cervical adenopathy.   Neurological: He is alert and oriented to person, place, and time. GCS eye subscore is 4. GCS verbal subscore is 5. GCS motor subscore is 6.   Skin: Skin is warm and dry. No ecchymosis and no rash noted. Rash is not maculopapular and not urticarial. He is not diaphoretic. No cyanosis or erythema. Nails show no clubbing.   Psychiatric: He has a normal mood and affect. His speech is normal and behavior is normal. Thought content normal. His mood appears not anxious. His affect is not angry and not inappropriate. He does not exhibit a depressed mood.   Nursing note and vitals reviewed.      Assessment and Plan:   Romeo was seen today for abdominal pain and shoulder pain.    Diagnoses and all orders for this visit:    Flank pain  -     CBC auto differential; Future  -     Comprehensive metabolic panel; Future  -     CT Renal Stone Study ABD Pelvis WO; Future  -     Urinalysis; Future  -     Urine culture; Future    Splenomegaly    Fever, unspecified fever cause  -     CBC auto differential; Future  -     Comprehensive metabolic panel; Future  -     Urinalysis; Future  -     Urine culture; Future    History of sore throat    Elevated blood-pressure reading without diagnosis of hypertension    Smoker    Other orders  -     ciprofloxacin HCl (CIPRO) 500 MG tablet; Take 1 tablet (500 mg total) by mouth every 12 (twelve) hours.  -     metroNIDAZOLE (FLAGYL) 500 MG tablet; Take 1 tablet (500 mg total) by mouth every 8 (eight) hours.  -     traMADol (ULTRAM) 50 mg tablet; Take 1 tablet (50 mg total) by mouth every 6 (six) hours as  needed for Pain.  -     ondansetron (ZOFRAN-ODT) 4 MG TbDL; Dissolve 2 tablets (8 mg total) by mouth every 6 (six) hours as needed.      Unusual findings given LUQ pain, splenomegaly and the pain radiating to the left shoulder.   Poss mononucleosis or other, more serious findings possible. Stat ct and labs.      //////////////////////////////////////    [place] of ct results and CBC indicating WBC > 200 in am of 4/24. Pt has already been sent to er by on-call MD and has been admitted to hospital which is appropriate.    Time spent in the evaluation and management of this patient exceeded 60min and greater than 50% of this time was in face-to-face education regarding diagnoses, medications, plan, and follow-up.    No follow-ups on file.    THIS NOTE WILL BE SHARED WITH THE PATIENT.

## 2019-04-24 NOTE — ED TRIAGE NOTES
Pt to the ED with complaints of left upper and lower quadrant abdominal pain that began yesterday with associated fever. Pt went to PCP and had CT scan done and was sent to ER and went to [facility] ER last night. Was told he had possible luekemia and was going to be admitted to oncology but no beds were available to be transferred here so he went home and decided to come back this morning for further workup and admission.    Patient identifiers verified and correct for Romeo Couch.  LOC: The patient is awake, alert and aware of environment with an appropriate affect, the patient is oriented x 3 and speaking appropriately.   APPEARANCE: Patient appears comfortable and in no acute distress, patient is clean and well groomed.  SKIN: The skin is warm and dry, color consistent with ethnicity, patient has normal skin turgor and moist mucus membranes, skin intact, no breakdown or bruising noted.   MUSCULOSKELETAL: Patient moving all extremities spontaneously, no swelling noted.  RESPIRATORY: Airway is open and patent, respirations are spontaneous, patient has a normal effort and rate, no accessory muscle use noted, pt placed on continuous pulse ox with O2 sats noted at 97% on room air.  CARDIAC: Pt placed on cardiac monitor. Patient has a normal rate and regular rhythm, no edema noted, capillary refill < 3 seconds.   GASTRO: Soft but tender to palpation to epigastic and left upper and lower quadrants, no distention noted. Pt states bowel movements have been regular. Pain to left upper quadrant.  : Pt denies any pain but reports frequency with urination.   NEURO: Pt opens eyes spontaneously, behavior appropriate to situation, follows commands, facial expression symmetrical, bilateral hand grasp equal and even, purposeful motor response noted, normal sensation in all extremities when touched with a finger.

## 2019-04-24 NOTE — ED PROVIDER NOTES
Encounter Date: 4/24/2019    SCRIBE #1 NOTE: I, Floyd Jose, am scribing for, and in the presence of,  Jose Oh MD. I have scribed the entire note.       History     Chief Complaint   Patient presents with    Leukemia     Pt reports he was was experiencing L sided pain, went to PCP and had CT scan done and was told to go to [facility] ER and went to [facility] last night. Was told he had possible luekemia and was going to be admitted to oncology but no beds were available to be transferred here so he went home and decided to come back this morning for further workup and admission     49 y.o. male patient who presents to the Emergency Department for abnormal labs with onset 1 day ago. Pt reports he woke up yesterday with left-sided abd pain, went to his PCP, had CT done, and was told to go to ER. Pt went to [facility] and was told he had possible leukemia and was going to be transferred to [facility], but they had no beds. Pt left [facility] and was told to report to [facility] this morning. Pt presents today with left-sided abd pain, fever, cough, night sweats, and SOB. He states the abd pain radiates from his left abd up to his left shoulder. Pt denies any N/V/D, chills, constipation, hematochezia, dysuria, hematuria, urinary frequency, difficulty urinating, urine decreased, and all other sxs at this time. A ten point review of systems was completed and is negative except as documented above. Patient denies any other acute medical complaint. The patient's available PMH, PSH, social history, medications, allergies, and triage vital signs were reviewed just prior to their medical evaluation.    The history is provided by the patient.     Review of patient's allergies indicates:  No Known Allergies  Past Medical History:   Diagnosis Date    Leukocytosis     Pneumothorax     29 yo     Past Surgical History:   Procedure Laterality Date    right shoulder surgery       right rotator cuff     Family History   Problem Relation Age of Onset    Cancer Mother         Ovarian     Heart disease Neg Hx      Social History     Tobacco Use    Smoking status: Never Smoker    Smokeless tobacco: Current User     Types: Snuff   Substance Use Topics    Alcohol use: Yes    Drug use: No     Review of Systems   Constitutional: Positive for diaphoresis and fever. Negative for chills.   HENT: Negative for sore throat.    Eyes: Negative for visual disturbance.   Respiratory: Positive for cough and shortness of breath.    Cardiovascular: Negative for chest pain.   Gastrointestinal: Positive for abdominal pain (left-sided, radiates into left shoulder). Negative for diarrhea, nausea and vomiting.   Genitourinary: Negative for dysuria.   Musculoskeletal: Negative for neck pain.   Skin: Negative for rash.   Allergic/Immunologic: Negative for immunocompromised state.   Neurological: Negative for syncope.   All other systems reviewed and are negative.      Physical Exam     Initial Vitals [04/24/19 0629]   BP Pulse Resp Temp SpO2   (!) 143/72 86 18 99.2 °F (37.3 °C) 100 %      MAP       --         Physical Exam    Nursing note and vitals reviewed.  Constitutional: He appears well-developed and well-nourished. He is not diaphoretic. No distress.   HENT:   Head: Normocephalic and atraumatic.   Mouth/Throat: Oropharynx is clear and moist.   Eyes: Conjunctivae are normal. Right eye exhibits no discharge. Left eye exhibits no discharge.   Neck: Normal range of motion. Neck supple.   Cardiovascular: Normal rate, regular rhythm and normal heart sounds. Exam reveals no gallop and no friction rub.    No murmur heard.  Pulmonary/Chest: Breath sounds normal. No respiratory distress. He has no wheezes. He has no rhonchi. He has no rales.   Abdominal: Soft. He exhibits no distension and no mass. There is splenomegaly. There is tenderness (LUQ TTP). There is no rebound and no guarding.   Musculoskeletal: Normal  range of motion. He exhibits no edema or tenderness.   Neurological: He is alert and oriented to person, place, and time. GCS score is 15. GCS eye subscore is 4. GCS verbal subscore is 5. GCS motor subscore is 6.   Skin: Skin is warm and dry. No rash noted. No erythema.   Psychiatric: He has a normal mood and affect. His behavior is normal. Judgment and thought content normal.         ED Course   Procedures  Labs Reviewed   BASIC METABOLIC PANEL - Abnormal; Notable for the following components:       Result Value    Potassium 3.1 (*)     All other components within normal limits   COMPREHENSIVE METABOLIC PANEL - Abnormal; Notable for the following components:    Potassium 3.1 (*)     All other components within normal limits   CBC WITHOUT DIFFERENTIAL - Abnormal; Notable for the following components:    .80 (*)     RBC 2.82 (*)     Hemoglobin 9.0 (*)     Hematocrit 28.2 (*)      (*)     MCH 31.9 (*)     MCHC 31.9 (*)     RDW 18.9 (*)     Platelets 141 (*)     All other components within normal limits    Narrative:       WBC critical result(s) called and verbal readback obtained from   Mar Rodas RN, 04/24/2019 11:12   LACTIC ACID, PLASMA   MAGNESIUM   HEPATITIS B CORE ANTIBODY, TOTAL   HEPATITIS PANEL, ACUTE   RAPID HIV   FLOW CYTOMETRY ANALYSIS (PERIPHERAL BLOOD)   G6PD,QUANTITATIVE   TYPE & SCREEN   TISSUE SPECIMEN TO PATHOLOGY, BONE MARROW ASPIRATION/BIOPSY PROCEDURE     EKG Readings: (Independently Interpreted)   Initial Reading: No STEMI. Rhythm: Normal Sinus Rhythm. Heart Rate: 85. Ectopy: No Ectopy. Conduction: RBBB (incomplete). T Waves Flipped: III and V2.   No hypertrophy.     ECG Results          EKG 12-lead (Final result)  Result time 04/24/19 20:21:05    Final result by Interface, Lab In [facility] (04/24/19 20:21:05)                 Narrative:    Test Reason : R07.9,    Vent. Rate : 067 BPM     Atrial Rate : 067 BPM     P-R Int : 126 ms          QRS Dur : 098 ms      QT Int : 424 ms        P-R-T Axes : 044 060 033 degrees     QTc Int : 448 ms    Normal sinus rhythm  Right ventricular conduction delay  Borderline Abnormal ECG  When compared with ECG of 24-APR-2019 06:50,  Nonspecific T wave abnormality has replaced inverted T waves in Inferior  leads  Confirmed by Lurdes Li MD ([id number]) on 4/24/2019 8:20:54 PM    Referred By: LUMA SINGH           Confirmed By:Lurdes Li MD                             EKG 12-lead (Final result)  Result time 04/24/19 20:52:39    Final result by Interface, Lab In [facility] (04/24/19 20:52:39)                 Narrative:    Test Reason : D72.829,    Vent. Rate : 085 BPM     Atrial Rate : 085 BPM     P-R Int : 130 ms          QRS Dur : 106 ms      QT Int : 390 ms       P-R-T Axes : 067 061 -03 degrees     QTc Int : 464 ms    Normal sinus rhythm  Incomplete right bundle branch block  Nonspecific ST and/or T wave abnormalities  Abnormal ECG  No previous ECGs available  Confirmed by SHANEL GOODE MD ([id number]) on 4/24/2019 8:52:35 PM    Referred By: LUMA SINGH           Confirmed By:SHANEL GOODE MD                            Imaging Results    None          Medical Decision Making:   History:   Old Medical Records: I decided to obtain old medical records.  Independently Interpreted Test(s):   I have ordered and independently interpreted EKG Reading(s) - see prior notes  Clinical Tests:   Lab Tests: Reviewed and Ordered  Radiological Study: Ordered and Reviewed  Medical Tests: Reviewed and Ordered  ED Management:  49-year-old male presents with very high leukocytosis.  Vitals unremarkable. Patient had a fever yesterday.  Physical exam with splenomegaly and left upper quadrant pain. CT from yesterday confirms.  Concern for a new leukemia.  Discussed with BMT who will admit.  Other:   I have discussed this case with another health care provider.       <> Summary of the Discussion: BMT  BMT            Scribe Attestation:   Scribe #1: I performed the above scribed service and the  documentation accurately describes the services I performed. I attest to the accuracy of the note.               Clinical Impression:       ICD-10-CM ICD-9-CM   1. Leukocytosis, unspecified type D72.829 288.60   2. Splenomegaly R16.1 789.2   3. Chest pain R07.9 786.50   4. Leukocytosis D72.829 288.60         Disposition:   Disposition: Admitted  Condition: Stable                        Jose Oh MD  04/25/19 0557

## 2019-04-24 NOTE — DISCHARGE SUMMARY
[facility]  Hematology  Bone Marrow Transplant  Discharge Summary      Patient Name: Romeo Couch Jr.  MRN: [id number]  Admission Date: 4/24/2019  Hospital Length of Stay: 0 days  Discharge Date and Time: 4/24/2019  5:03 PM  Attending Physician: Catalina Rucker MD   Discharging Provider: Carmen Thapa NP  Primary Care Provider: Matty Catherine MD    HPI: Mr. Couch is a 49-y-o male patient who presented to [facility] ED over night. He reported that he went to his PCP with left abdominal pain and temp of 100.4 yesterday and was sent by PCP for CT. CT showed splenomegaly. Pt was instructed to go to ED. Went to [place] ED and was found to have leukocytosis. Wanted to transfer patient to [facility], but no beds were available. Patient reportedly left [place] ED AMA and came to [facility] ED over night. WBC 238K. Suspicious for acute leukemia. He denies any aches, chills, n/v/d, constipation, chest pain, bleeding or bruising. C/o SOB on exertion and mild, non-productive cough. Patient states that he has lost 8 lbs in the last few weeks. Further states that he has had recurrent ear infection over the last few weeks. States that they would respond to abx but would recur upon abx completion. Will admit inpatient and plan for BMBx when patient arrives to floor.     * No surgery found *     Hospital Course: Patient presented to ED on 4/24/19 with c/o left abdominal pain (splenomegaly on CT). WBC 238K. Otherwise asymptomatic. Diff suggestive of CML. Discharged home on hydrea 1000 mg BID and allopurinol 300 mg daily. Plan for BMBx in OR tomorrow and clinic follow-up with Dr. RuckerPatient presented to     Left sided abdominal pain  - saw PCP for left sided abdominal pain on 4/23/19. CT performed and showed splenomegaly     Leukocytosis  - was sent to [place] ED 4/23/19 due to splenomegaly on CT. WBC found to be 220K.  - WBC 238K upon presentation to [facility] ED  - started on IVF, hydrea 3000 mg BID, and allopurinol 300  mg daily  - will check TLS and DIC labs BID  - planning BMBx once patient is in room (currently in bautista bed in ED)  - will check flow cytometry via peripheral blood   - will check echo, Hep panel, rapid HIV, and G6PD    Consults (From admission, onward)        Status Ordering Provider     Inpatient consult to Oncology  Once     Provider:  (Not yet assigned)    Acknowledged DARÍO JACKSON          Significant Diagnostic Studies: Labs:   CMP   Recent Labs   Lab 04/23/19  1513 04/23/19  2305 04/24/19  0657    141 142  142   K 4.0 3.3* 3.1*  3.1*    101 104  104   CO2 29 29 27  27   GLU 77 107 105  105   BUN 10 12 9  9   CREATININE 1.1 1.11 1.1  1.1   CALCIUM 10.0 9.7 10.0  10.0   PROT 7.5 8.0 7.6   ALBUMIN 4.1 4.5 3.9   BILITOT 0.8 0.7 0.9   ALKPHOS 89 82 91   AST 40 47* 33   ALT 32 37 29   ANIONGAP 9 11 11  11   ESTGFRAFRICA >60.0 >60.0 >60.0  >60.0   EGFRNONAA >60.0 >60.0 >60.0  >60.0    and CBC   Recent Labs   Lab 04/23/19  1513 04/23/19  2305 04/24/19  0952   .70* 238.80* 200.80*   HGB 9.3* 9.6* 9.0*   HCT 30.8* 30.1* 28.2*   * 152 141*       Pending Diagnostic Studies:     Procedure Component Value Units Date/Time    Flow Cytometry Analysis (Peripheral Blood) [[id number]] Collected:  04/24/19 0952    Order Status:  Sent Lab Status:  In process Updated:  04/24/19 1451    Specimen:  Blood     G6PD,Quantitative [[id number]] Collected:  04/24/19 0952    Order Status:  Sent Lab Status:  In process Updated:  04/24/19 1059    Specimen:  Blood         Final Active Diagnoses:    Diagnosis Date Noted POA    PRINCIPAL PROBLEM:  Leukocytosis [D72.829] 04/24/2019 Yes    Left sided abdominal pain [R10.9] 04/24/2019 Yes      Problems Resolved During this Admission:      Discharged Condition: stable    Disposition: Home or Self Care    Follow Up:    Patient Instructions:      CBC W/ AUTO DIFFERENTIAL   Standing Status: Future Standing Exp. Date: 06/22/20     Comprehensive metabolic panel    Standing Status: Future Standing Exp. Date: 06/22/20     Magnesium   Standing Status: Future Standing Exp. Date: 06/22/20     PHOSPHORUS   Standing Status: Future Standing Exp. Date: 06/22/20     Diet Adult Regular     Notify your health care provider if you experience any of the following:  temperature >100.4     Notify your health care provider if you experience any of the following:  persistent nausea and vomiting or diarrhea     Notify your health care provider if you experience any of the following:  severe uncontrolled pain     Notify your health care provider if you experience any of the following:  difficulty breathing or increased cough     Notify your health care provider if you experience any of the following:  severe persistent headache     Notify your health care provider if you experience any of the following:  persistent dizziness, light-headedness, or visual disturbances     Notify your health care provider if you experience any of the following:  increased confusion or weakness     Type & Screen   Standing Status: Future Standing Exp. Date: 06/22/20     Activity as tolerated     Medications:  Reconciled Home Medications:      Medication List      START taking these medications    allopurinol 300 MG tablet  Commonly known as:  ZYLOPRIM  Take 1 tablet (300 mg total) by mouth once daily.  Start taking on:  4/25/2019     hydroxyurea 500 mg Cap  Commonly known as:  HYDREA  Take 2 capsules (1,000 mg total) by mouth once daily.  Start taking on:  4/25/2019        CONTINUE taking these medications    fluticasone 50 mcg/actuation nasal spray  Commonly known as:  FLONASE  1 spray by Each Nare route once daily.     ondansetron 4 MG Tbdl  Commonly known as:  ZOFRAN-ODT  Dissolve 2 tablets (8 mg total) by mouth every 6 (six) hours as needed.     traMADol 50 mg tablet  Commonly known as:  ULTRAM  Take 1 tablet (50 mg total) by mouth every 6 (six) hours as needed for Pain.        STOP taking these medications     ciprofloxacin HCl 500 MG tablet  Commonly known as:  CIPRO     metroNIDAZOLE 500 MG tablet  Commonly known as:  ISUARA Thapa, NP  Bone Marrow Transplant  [facility]

## 2019-04-25 ENCOUNTER — ANESTHESIA EVENT (OUTPATIENT)
Dept: SURGERY | Facility: HOSPITAL | Age: 49
End: 2019-04-25
Payer: COMMERCIAL

## 2019-04-25 ENCOUNTER — ANESTHESIA (OUTPATIENT)
Dept: SURGERY | Facility: HOSPITAL | Age: 49
End: 2019-04-25
Payer: COMMERCIAL

## 2019-04-25 ENCOUNTER — HOSPITAL ENCOUNTER (OUTPATIENT)
Facility: HOSPITAL | Age: 49
Discharge: HOME | End: 2019-04-25
Attending: INTERNAL MEDICINE | Admitting: INTERNAL MEDICINE
Payer: COMMERCIAL

## 2019-04-25 VITALS
WEIGHT: 189 LBS | HEIGHT: 69 IN | HEART RATE: 60 BPM | DIASTOLIC BLOOD PRESSURE: 59 MMHG | OXYGEN SATURATION: 98 % | BODY MASS INDEX: 27.99 KG/M2 | RESPIRATION RATE: 16 BRPM | SYSTOLIC BLOOD PRESSURE: 104 MMHG | TEMPERATURE: 98 F

## 2019-04-25 DIAGNOSIS — Z12.11 COLON CANCER SCREENING: ICD-10-CM

## 2019-04-25 DIAGNOSIS — C92.10 CML (CHRONIC MYELOCYTIC LEUKEMIA): ICD-10-CM

## 2019-04-25 LAB — BONE MARROW WRIGHT STAIN COMMENT: NORMAL

## 2019-04-25 PROCEDURE — 81207 BCR/ABL1 GENE MINOR BP: CPT

## 2019-04-25 PROCEDURE — 88237 TISSUE CULTURE BONE MARROW: CPT

## 2019-04-25 PROCEDURE — 88341 IMHCHEM/IMCYTCHM EA ADD ANTB: CPT | Mod: 26,59,, | Performed by: PATHOLOGY

## 2019-04-25 PROCEDURE — 88313 SPECIAL STAINS GROUP 2: CPT

## 2019-04-25 PROCEDURE — 88305 TISSUE EXAM BY PATHOLOGIST: CPT | Mod: 26,,, | Performed by: PATHOLOGY

## 2019-04-25 PROCEDURE — 36000705 HC OR TIME LEV I EA ADD 15 MIN: Performed by: INTERNAL MEDICINE

## 2019-04-25 PROCEDURE — 38222 PR BONE MARROW BIOPSY(IES) W/ASPIRATION(S); DIAGNOSTIC: ICD-10-PCS | Mod: LT,,, | Performed by: NURSE PRACTITIONER

## 2019-04-25 PROCEDURE — 88341 TISSUE SPECIMEN TO PATHOLOGY, BONE MARROW ASPIRATION/BIOPSY PROCEDURE: ICD-10-PCS | Mod: 26,59,, | Performed by: PATHOLOGY

## 2019-04-25 PROCEDURE — 88189 PR  FLOWCYTOMETRY/READ, 16 & > MARKERS: ICD-10-PCS | Mod: ,,, | Performed by: PATHOLOGY

## 2019-04-25 PROCEDURE — 88185 FLOWCYTOMETRY/TC ADD-ON: CPT | Mod: 59 | Performed by: PATHOLOGY

## 2019-04-25 PROCEDURE — 38222 DX BONE MARROW BX & ASPIR: CPT | Mod: LT,,, | Performed by: NURSE PRACTITIONER

## 2019-04-25 PROCEDURE — 88342 TISSUE SPECIMEN TO PATHOLOGY, BONE MARROW ASPIRATION/BIOPSY PROCEDURE: ICD-10-PCS | Mod: 26,59,, | Performed by: PATHOLOGY

## 2019-04-25 PROCEDURE — 88305 TISSUE EXAM BY PATHOLOGIST: CPT | Performed by: PATHOLOGY

## 2019-04-25 PROCEDURE — 88305 TISSUE SPECIMEN TO PATHOLOGY, BONE MARROW ASPIRATION/BIOPSY PROCEDURE: ICD-10-PCS | Mod: 26,,, | Performed by: PATHOLOGY

## 2019-04-25 PROCEDURE — 88342 IMHCHEM/IMCYTCHM 1ST ANTB: CPT | Mod: 59 | Performed by: PATHOLOGY

## 2019-04-25 PROCEDURE — 88313 TISSUE SPECIMEN TO PATHOLOGY, BONE MARROW ASPIRATION/BIOPSY PROCEDURE: ICD-10-PCS | Mod: 26,,, | Performed by: PATHOLOGY

## 2019-04-25 PROCEDURE — 88311 TISSUE SPECIMEN TO PATHOLOGY, BONE MARROW ASPIRATION/BIOPSY PROCEDURE: ICD-10-PCS | Mod: 26,,, | Performed by: PATHOLOGY

## 2019-04-25 PROCEDURE — 37000008 HC ANESTHESIA 1ST 15 MINUTES: Performed by: INTERNAL MEDICINE

## 2019-04-25 PROCEDURE — 88311 DECALCIFY TISSUE: CPT | Mod: 26,,, | Performed by: PATHOLOGY

## 2019-04-25 PROCEDURE — D9220A PRA ANESTHESIA: ICD-10-PCS | Mod: ANES,,, | Performed by: ANESTHESIOLOGY

## 2019-04-25 PROCEDURE — 81206 BCR/ABL1 GENE MAJOR BP: CPT

## 2019-04-25 PROCEDURE — D9220A PRA ANESTHESIA: ICD-10-PCS | Mod: CRNA,,, | Performed by: NURSE ANESTHETIST, CERTIFIED REGISTERED

## 2019-04-25 PROCEDURE — 88299 UNLISTED CYTOGENETIC STUDY: CPT

## 2019-04-25 PROCEDURE — D9220A PRA ANESTHESIA: Mod: CRNA,,, | Performed by: NURSE ANESTHETIST, CERTIFIED REGISTERED

## 2019-04-25 PROCEDURE — 71000015 HC POSTOP RECOV 1ST HR: Performed by: INTERNAL MEDICINE

## 2019-04-25 PROCEDURE — 25000003 PHARM REV CODE 250: Performed by: NURSE ANESTHETIST, CERTIFIED REGISTERED

## 2019-04-25 PROCEDURE — 88189 FLOWCYTOMETRY/READ 16 & >: CPT | Mod: ,,, | Performed by: PATHOLOGY

## 2019-04-25 PROCEDURE — 88184 FLOWCYTOMETRY/ TC 1 MARKER: CPT | Performed by: PATHOLOGY

## 2019-04-25 PROCEDURE — 85097 BONE MARROW INTERPRETATION: CPT | Mod: ,,, | Performed by: PATHOLOGY

## 2019-04-25 PROCEDURE — D9220A PRA ANESTHESIA: Mod: ANES,,, | Performed by: ANESTHESIOLOGY

## 2019-04-25 PROCEDURE — 88313 SPECIAL STAINS GROUP 2: CPT | Mod: 26,,, | Performed by: PATHOLOGY

## 2019-04-25 PROCEDURE — 63600175 PHARM REV CODE 636 W HCPCS: Performed by: NURSE ANESTHETIST, CERTIFIED REGISTERED

## 2019-04-25 PROCEDURE — 36000704 HC OR TIME LEV I 1ST 15 MIN: Performed by: INTERNAL MEDICINE

## 2019-04-25 PROCEDURE — 37000009 HC ANESTHESIA EA ADD 15 MINS: Performed by: INTERNAL MEDICINE

## 2019-04-25 PROCEDURE — 85097 TISSUE SPECIMEN TO PATHOLOGY, BONE MARROW ASPIRATION/BIOPSY PROCEDURE: ICD-10-PCS | Mod: ,,, | Performed by: PATHOLOGY

## 2019-04-25 PROCEDURE — 71000044 HC DOSC ROUTINE RECOVERY FIRST HOUR: Performed by: INTERNAL MEDICINE

## 2019-04-25 PROCEDURE — 88342 IMHCHEM/IMCYTCHM 1ST ANTB: CPT | Mod: 26,59,, | Performed by: PATHOLOGY

## 2019-04-25 PROCEDURE — 88264 CHROMOSOME ANALYSIS 20-25: CPT

## 2019-04-25 RX ORDER — LIDOCAINE HCL/PF 100 MG/5ML
SYRINGE (ML) INTRAVENOUS
Status: DISCONTINUED | OUTPATIENT
Start: 2019-04-25 | End: 2019-04-25

## 2019-04-25 RX ORDER — SODIUM CHLORIDE 9 MG/ML
INJECTION, SOLUTION INTRAVENOUS CONTINUOUS PRN
Status: DISCONTINUED | OUTPATIENT
Start: 2019-04-25 | End: 2019-04-25

## 2019-04-25 RX ORDER — PROPOFOL 10 MG/ML
VIAL (ML) INTRAVENOUS
Status: DISCONTINUED | OUTPATIENT
Start: 2019-04-25 | End: 2019-04-25

## 2019-04-25 RX ORDER — PROPOFOL 10 MG/ML
VIAL (ML) INTRAVENOUS CONTINUOUS PRN
Status: DISCONTINUED | OUTPATIENT
Start: 2019-04-25 | End: 2019-04-25

## 2019-04-25 RX ADMIN — PROPOFOL 100 MG: 10 INJECTION, EMULSION INTRAVENOUS at 09:04

## 2019-04-25 RX ADMIN — SODIUM CHLORIDE: 0.9 INJECTION, SOLUTION INTRAVENOUS at 09:04

## 2019-04-25 RX ADMIN — PROPOFOL 400 MCG/KG/MIN: 10 INJECTION, EMULSION INTRAVENOUS at 09:04

## 2019-04-25 RX ADMIN — LIDOCAINE HYDROCHLORIDE 100 MG: 20 INJECTION, SOLUTION INTRAVENOUS at 09:04

## 2019-04-25 NOTE — DISCHARGE INSTRUCTIONS
Discharge instructions for having a Bone Marrow Aspiration / Biopsy:    Keep Bandage in place for 24 hours.  - Do not shower or take a tube bath during this time (you may sponge bathe).  - Call the nurse or physician for excessive bleeding or pain at biopsy site.  - You may take Tylenol as needed for pain.    You have received medication to sedate you.  - Do not drive a car or operate heavy machinery for the rest of the day.  - You may resume other activities as tolerated.    You can call [phone] for any problems during the hours of 8:00 AM-5:00PM.    For an emergency after 5:00 PM you can call [phone] and have the  page the Hematologist / Oncologist on call.

## 2019-04-25 NOTE — ANESTHESIA PREPROCEDURE EVALUATION
04/25/2019  Romeo Couch Jr. is a 49 y.o., male.    Anesthesia Evaluation    I have reviewed the Patient Summary Reports.     I have reviewed the Medications.     Review of Systems  Anesthesia Hx:  No problems with previous Anesthesia Denies Hx of Anesthetic complications  Neg history of prior surgery. Denies Family Hx of Anesthesia complications.   Denies Personal Hx of Anesthesia complications.   Hematology/Oncology:  Hematology Normal      Current/Recent Cancer.   EENT/Dental:EENT/Dental Normal   Cardiovascular:   Exercise tolerance: good Hypertension, well controlled Denies MI.    Denies Angina.  Functional Capacity good / => 4 METS  Carotoid Artery Disease    Pulmonary:  Pulmonary Normal    Renal/:  Renal/ Normal     Hepatic/GI:   Denies GERD.    Neurological:  Neurology Normal  Denies Pain    Endocrine:  Endocrine Normal    Psych:  Psychiatric Normal   Phobia and Claustrophobia.         Physical Exam   Airway/Jaw/Neck:  Airway Findings: Mouth Opening: Normal Tongue: Normal  General Airway Assessment: Adult  Mallampati: III  Improves to II with phonation.  TM Distance: Normal, at least 6 cm  Jaw/Neck Findings:  Neck ROM: Normal ROM      Dental:  Dental Findings: In tact    Chest/Lungs:  Chest/Lungs Findings: Clear to auscultation, Normal Respiratory Rate     Heart/Vascular:  Heart Findings: Rate: Normal  Rhythm: Regular Rhythm  Sounds: Normal             Anesthesia Plan  Type of Anesthesia, risks & benefits discussed:  Anesthesia Type:  general  Patient's Preference:   Intra-op Monitoring Plan: standard ASA monitors  Intra-op Monitoring Plan Comments:   Post Op Pain Control Plan: multimodal analgesia  Post Op Pain Control Plan Comments:   Induction:   IV  Beta Blocker:  Patient is not currently on a Beta-Blocker (No further documentation required).       Informed Consent: Patient understands  risks and agrees with Anesthesia plan.  Questions answered. Anesthesia consent signed with patient.  ASA Score: 2     Day of Surgery Review of History & Physical:    H&P update referred to the surgeon.         Ready For Surgery From Anesthesia Perspective.

## 2019-04-25 NOTE — DISCHARGE SUMMARY
[facility]  Hematology  Bone Marrow Transplant  Discharge Summary      Patient Name: Romeo Couch Jr.  MRN: [id number]  Admission Date: 4/25/2019  Hospital Length of Stay: 0 days  Discharge Date and Time:  04/25/2019 9:34 AM  Attending Physician: Catalina Rucker MD   Discharging Provider: Delia Hollins NP  Primary Care Provider: Matty Catherine MD    HPI: No notes on file    Procedure(s) (LRB):  Biopsy-bone marrow (Left)     Hospital Course: Patient admitted to pre op today for a bone marrow aspiration and biopsy. Pt was consented for a bone marrow biopsy. Patient was sedated per anesthesia and a bone marrow biopsy and aspiration was performed in the OR (see procedure note). Patient was then transferred to post op and discharged home when appropriate per anesthesia.       Pending Diagnostic Studies:     Procedure Component Value Units Date/Time    BCR/ABL, p190, bone marrow [[id number]] Collected:  04/25/19 0918    Order Status:  Sent Lab Status:  In process Updated:  04/25/19 0918    Specimen:  Bone Marrow     BCR/ABL, p210, bone marrow [[id number]] Collected:  04/25/19 0918    Order Status:  Sent Lab Status:  In process Updated:  04/25/19 0918    Specimen:  Bone Marrow     Bone Marrow Prep and Stain [[id number]] Collected:  04/25/19 0917    Order Status:  Sent Lab Status:  In process Updated:  04/25/19 0918    Specimen:  Bone Marrow     Chromosome Analysis, Bone Marrow [[id number]] Collected:  04/25/19 0918    Order Status:  Sent Lab Status:  In process Updated:  04/25/19 0918    Specimen:  Bone Marrow     HEMATOLOGIC DISORDERS,FISH (HOLD) [[id number]] Collected:  04/25/19 0918    Order Status:  Sent Lab Status:  In process Updated:  04/25/19 0918    Specimen:  Blood from Bone Marrow     Heme Disorders DNA/RNA Hold, Bone Marrow [[id number]] Collected:  04/25/19 0917    Order Status:  Sent Lab Status:  In process Updated:  04/25/19 0918    Specimen:  Bone Marrow     Iron Stain, Bone Marrow  [[id number]] Collected:  04/25/19 0917    Order Status:  Sent Lab Status:  In process Updated:  04/25/19 0918    Specimen:  Bone Marrow     Leukemia/Lymphoma Screen - Bone Marrow Right Posterior Iliac Crest [[id number]] Collected:  04/25/19 0918    Order Status:  Sent Lab Status:  In process Updated:  04/25/19 0918    Specimen:  Bone Marrow     Tissue Specimen to Pathology, Bone Marrow Aspiration/Biopsy Procedure [[id number]] Collected:  04/25/19 0917    Order Status:  Sent Lab Status:  No result     Specimen:  Bone Marrow Aspirate, Left Iliac Crest         There are no hospital problems to display for this patient.     Discharged Condition: stable    Disposition: Home or Self Care    Follow Up: With Dr. Rucker in BMT clinic    Patient Instructions:      Notify your health care provider if you experience any of the following:  temperature >100.4     Notify your health care provider if you experience any of the following:  severe uncontrolled pain     Notify your health care provider if you experience any of the following:  redness, tenderness, or signs of infection (pain, swelling, redness, odor or green/yellow discharge around incision site)     Remove dressing in 24 hours     Activity as tolerated     Medications:  Reconciled Home Medications:      Medication List      ASK your doctor about these medications    allopurinol 300 MG tablet  Commonly known as:  ZYLOPRIM  Take 1 tablet (300 mg total) by mouth once daily.     fluticasone 50 mcg/actuation nasal spray  Commonly known as:  FLONASE  1 spray by Each Nare route once daily.     hydroxyurea 500 mg Cap  Commonly known as:  HYDREA  Take 2 capsules (1,000 mg total) by mouth once daily.     ondansetron 4 MG Tbdl  Commonly known as:  ZOFRAN-ODT  Dissolve 2 tablets (8 mg total) by mouth every 6 (six) hours as needed.     traMADol 50 mg tablet  Commonly known as:  ULTRAM  Take 1 tablet (50 mg total) by mouth every 6 (six) hours as needed for Pain.            Delia WALSH  AMOS Hollins  Bone Marrow Transplant  [facility]

## 2019-04-25 NOTE — ANESTHESIA POSTPROCEDURE EVALUATION
Anesthesia Post Evaluation    Patient: Romeo Couch Jr.    Procedure(s) Performed: Procedure(s) (LRB):  Biopsy-bone marrow (Left)    Final Anesthesia Type: general  Patient location during evaluation: [facility]  Patient participation: Yes- Able to Participate  Level of consciousness: awake and alert  Post-procedure vital signs: reviewed and stable  Pain management: adequate  Airway patency: patent  PONV status at discharge: No PONV  Anesthetic complications: no      Cardiovascular status: blood pressure returned to baseline  Respiratory status: unassisted  Hydration status: euvolemic  Follow-up not needed.          Vitals Value Taken Time   /55 4/25/2019  9:30 AM   Temp 36.8 °C (98.2 °F) 4/25/2019  9:30 AM   Pulse 68 4/25/2019  9:30 AM   Resp 15 4/25/2019  9:30 AM   SpO2 99 % 4/25/2019  9:30 AM         No case tracking events are documented in the log.      Pain/Ora Score: Pain Rating Prior to Med Admin: 0 (4/25/2019  9:30 AM)

## 2019-04-25 NOTE — BRIEF OP NOTE
PROCEDURE NOTE:  Date of Procedure: 04/25/2019  Bone Marrow Biopsy and Aspiration  Indication: Leukocytosis/possible CML  Consent: Informed consent was obtained from patient.  Timeout: Done and documented.  Position: right lateral  Site: leftt posterior illiac crest.  Prep: Betadine.  Needle used: 11 gauge Jamshidi needle.  Anesthetic: 1% lidocaine 5 cc.  Biopsy: The biopsy needle was introduced into the marrow cavity and an aspirate was obtained without complications and sent for flow cytometry, BCR/ALB P190 & P210, DNA/RNA hold, Fish hold, and cytogenetics. Core biopsy obtained without difficulty and sent for routine histologic examination.  Complications: None.  Disposition: The patient was discharged home per anesthesia protocol.  Blood loss: Minimal.     Delia Hollins NP  Hematology/Oncology/BMT

## 2019-04-25 NOTE — ANESTHESIA RELEASE NOTE
"Anesthesia Release from PACU Note    Patient: Romeo Couch Jr.    Procedure(s) Performed: Procedure(s) (LRB):  Biopsy-bone marrow (Left)    Anesthesia type: general    Post pain: Adequate analgesia    Post assessment: no apparent anesthetic complications    Last Vitals:   Visit Vitals  BP (!) 100/55 (BP Location: Left arm, Patient Position: Lying)   Pulse 68   Temp 36.8 °C (98.2 °F) (Temporal)   Resp 15   Ht 5' 9" (1.753 m)   Wt 85.7 kg (189 lb)   SpO2 99%   BMI 27.91 kg/m²       Post vital signs: stable    Level of consciousness: awake    Nausea/Vomiting: no nausea/no vomiting    Complications: none    Airway Patency: patent    Respiratory: unassisted    Cardiovascular: stable and blood pressure at baseline    Hydration: euvolemic  "

## 2019-04-25 NOTE — TRANSFER OF CARE
"Anesthesia Transfer of Care Note    Patient: Romeo Couch Jr.    Procedure(s) Performed: Procedure(s) (LRB):  Biopsy-bone marrow (Left)    Patient location: PACU    Anesthesia Type: general    Transport from OR: Transported from OR on 6-10 L/min O2 by face mask with adequate spontaneous ventilation    Post pain: adequate analgesia    Post assessment: no apparent anesthetic complications    Post vital signs: stable    Level of consciousness: sedated    Nausea/Vomiting: no nausea/vomiting    Complications: none    Transfer of care protocol was followed      Last vitals:   Visit Vitals  /55   Pulse 66   Temp 36.9 °C (98.4 °F) (Oral)   Resp 18   Ht 5' 9" (1.753 m)   Wt 85.7 kg (189 lb)   SpO2 100%   BMI 27.91 kg/m²     "

## 2019-04-26 LAB
BODY SITE - BONE MARROW: NORMAL
BONE MARROW IRON STAIN COMMENT: NORMAL
CLINICAL DIAGNOSIS - BONE MARROW: NORMAL
FLOW CYTOMETRY ANTIBODIES ANALYZED - BONE MARROW: NORMAL
FLOW CYTOMETRY COMMENT - BONE MARROW: NORMAL
FLOW CYTOMETRY INTERPRETATION - BONE MARROW: NORMAL
G6PD RBC-CCNT: 14.4 U/G HGB (ref 7–20.5)

## 2019-04-27 LAB
DNA/RNA EXTRACT AND HOLD RESULT: NORMAL
DNA/RNA EXTRACTION: NORMAL
EXHR SPECIMEN TYPE: NORMAL

## 2019-04-29 ENCOUNTER — LAB VISIT (OUTPATIENT)
Dept: LAB | Facility: HOSPITAL | Age: 49
End: 2019-04-29
Attending: INTERNAL MEDICINE
Payer: COMMERCIAL

## 2019-04-29 DIAGNOSIS — D72.829 LEUKOCYTOSIS: ICD-10-CM

## 2019-04-29 LAB
ABO + RH BLD: NORMAL
ALBUMIN SERPL BCP-MCNC: 4 G/DL (ref 3.5–5.2)
ALP SERPL-CCNC: 85 U/L (ref 55–135)
ALT SERPL W/O P-5'-P-CCNC: 39 U/L (ref 10–44)
ANION GAP SERPL CALC-SCNC: 9 MMOL/L (ref 8–16)
ANISOCYTOSIS BLD QL SMEAR: SLIGHT
AST SERPL-CCNC: 45 U/L (ref 10–40)
BASOPHILS # BLD AUTO: ABNORMAL K/UL (ref 0–0.2)
BASOPHILS NFR BLD: 3 % (ref 0–1.9)
BCR/ABL RESULT, P190, QUANT, BM: NORMAL
BCR/ABL,P210 RESULT: NORMAL
BILIRUB SERPL-MCNC: 0.8 MG/DL (ref 0.1–1)
BLASTS NFR BLD MANUAL: 5 %
BLD GP AB SCN CELLS X3 SERPL QL: NORMAL
BUN SERPL-MCNC: 16 MG/DL (ref 6–20)
CALCIUM SERPL-MCNC: 9.9 MG/DL (ref 8.7–10.5)
CHLORIDE SERPL-SCNC: 104 MMOL/L (ref 95–110)
CO2 SERPL-SCNC: 27 MMOL/L (ref 23–29)
CREAT SERPL-MCNC: 1.2 MG/DL (ref 0.5–1.4)
DIFFERENTIAL METHOD: ABNORMAL
EOSINOPHIL # BLD AUTO: ABNORMAL K/UL (ref 0–0.5)
EOSINOPHIL NFR BLD: 3 % (ref 0–8)
ERYTHROCYTE [DISTWIDTH] IN BLOOD BY AUTOMATED COUNT: 19.3 % (ref 11.5–14.5)
EST. GFR  (AFRICAN AMERICAN): >60 ML/MIN/1.73 M^2
EST. GFR  (NON AFRICAN AMERICAN): >60 ML/MIN/1.73 M^2
GLUCOSE SERPL-MCNC: 90 MG/DL (ref 70–110)
HCT VFR BLD AUTO: 29 % (ref 40–54)
HGB BLD-MCNC: 9.2 G/DL (ref 14–18)
IMM GRANULOCYTES # BLD AUTO: ABNORMAL K/UL (ref 0–0.04)
IMM GRANULOCYTES NFR BLD AUTO: ABNORMAL % (ref 0–0.5)
LYMPHOCYTES # BLD AUTO: ABNORMAL K/UL (ref 1–4.8)
LYMPHOCYTES NFR BLD: 4.5 % (ref 18–48)
MAGNESIUM SERPL-MCNC: 2.3 MG/DL (ref 1.6–2.6)
MCH RBC QN AUTO: 31.5 PG (ref 27–31)
MCHC RBC AUTO-ENTMCNC: 31.7 G/DL (ref 32–36)
MCV RBC AUTO: 99 FL (ref 82–98)
METAMYELOCYTES NFR BLD MANUAL: 2.5 %
MONOCYTES # BLD AUTO: ABNORMAL K/UL (ref 0.3–1)
MONOCYTES NFR BLD: 9 % (ref 4–15)
MYELOCYTES NFR BLD MANUAL: 6.5 %
NEUTROPHILS NFR BLD: 59 % (ref 38–73)
NEUTS BAND NFR BLD MANUAL: 2.5 %
NRBC BLD-RTO: 2 /100 WBC
PATH REPORT.FINAL DX SPEC: NORMAL
PATH REPORT.FINAL DX SPEC: NORMAL
PHOSPHATE SERPL-MCNC: 4.4 MG/DL (ref 2.7–4.5)
PLATELET # BLD AUTO: 176 K/UL (ref 150–350)
PMV BLD AUTO: 10.4 FL (ref 9.2–12.9)
POLYCHROMASIA BLD QL SMEAR: ABNORMAL
POTASSIUM SERPL-SCNC: 3.8 MMOL/L (ref 3.5–5.1)
PROMYELOCYTES NFR BLD MANUAL: 5 %
PROT SERPL-MCNC: 7.4 G/DL (ref 6–8.4)
RBC # BLD AUTO: 2.92 M/UL (ref 4.6–6.2)
SODIUM SERPL-SCNC: 140 MMOL/L (ref 136–145)
SPECIMEN TYPE, P190, QUANT, BM: NORMAL
SPECIMEN TYPE: NORMAL
WBC # BLD AUTO: 175 K/UL (ref 3.9–12.7)

## 2019-04-29 PROCEDURE — 83735 ASSAY OF MAGNESIUM: CPT

## 2019-04-29 PROCEDURE — 84100 ASSAY OF PHOSPHORUS: CPT

## 2019-04-29 PROCEDURE — 36415 COLL VENOUS BLD VENIPUNCTURE: CPT

## 2019-04-29 PROCEDURE — 80053 COMPREHEN METABOLIC PANEL: CPT

## 2019-04-29 PROCEDURE — 85027 COMPLETE CBC AUTOMATED: CPT

## 2019-04-29 PROCEDURE — 86901 BLOOD TYPING SEROLOGIC RH(D): CPT

## 2019-04-29 PROCEDURE — 85007 BL SMEAR W/DIFF WBC COUNT: CPT

## 2019-04-30 ENCOUNTER — OFFICE VISIT (OUTPATIENT)
Dept: HEMATOLOGY/ONCOLOGY | Facility: CLINIC | Age: 49
End: 2019-04-30
Payer: COMMERCIAL

## 2019-04-30 VITALS
TEMPERATURE: 99 F | DIASTOLIC BLOOD PRESSURE: 77 MMHG | WEIGHT: 185.19 LBS | OXYGEN SATURATION: 100 % | RESPIRATION RATE: 16 BRPM | SYSTOLIC BLOOD PRESSURE: 145 MMHG | HEIGHT: 69 IN | BODY MASS INDEX: 27.43 KG/M2 | HEART RATE: 95 BPM

## 2019-04-30 DIAGNOSIS — D72.829 LEUKOCYTOSIS, UNSPECIFIED TYPE: ICD-10-CM

## 2019-04-30 DIAGNOSIS — D63.0 ANEMIA IN NEOPLASTIC DISEASE: ICD-10-CM

## 2019-04-30 DIAGNOSIS — R16.1 SPLENOMEGALY: ICD-10-CM

## 2019-04-30 DIAGNOSIS — C92.10 CML (CHRONIC MYELOCYTIC LEUKEMIA): Primary | ICD-10-CM

## 2019-04-30 PROCEDURE — 99215 OFFICE O/P EST HI 40 MIN: CPT | Mod: S$GLB,,, | Performed by: INTERNAL MEDICINE

## 2019-04-30 PROCEDURE — 99999 PR PBB SHADOW E&M-EST. PATIENT-LVL III: CPT | Mod: PBBFAC,,, | Performed by: INTERNAL MEDICINE

## 2019-04-30 PROCEDURE — 99215 PR OFFICE/OUTPT VISIT, EST, LEVL V, 40-54 MIN: ICD-10-PCS | Mod: S$GLB,,, | Performed by: INTERNAL MEDICINE

## 2019-04-30 PROCEDURE — 99999 PR PBB SHADOW E&M-EST. PATIENT-LVL III: ICD-10-PCS | Mod: PBBFAC,,, | Performed by: INTERNAL MEDICINE

## 2019-04-30 RX ORDER — IMATINIB MESYLATE 400 MG/1
400 TABLET, FILM COATED ORAL DAILY
Qty: 30 TABLET | Refills: 11 | Status: SHIPPED | OUTPATIENT
Start: 2019-04-30 | End: 2019-04-30 | Stop reason: CLARIF

## 2019-04-30 NOTE — Clinical Note
1. Cbc,cmp,uric acid on tues/friday at [facility]2. See me 5/14 at 5pm with cbc,cmp,uric acid

## 2019-04-30 NOTE — PROGRESS NOTES
Hematology and Medical Oncology   New Patient Consult     04/30/2019    Primary Hematologic Diagnosis: CML    History of Present Ilness:   Romeo Couch Jr. (Romeo) is a pleasant 49 y.o.male who was recently diagnosed with CML presents to discuss treatment planning.Folowing hospital discharge he has felt well, denies all pain and has returned to work.    Hematology History:  --Presented to [facility] ED on 4/23/19. He reported that he went to his PCP with left abdominal pain and temp of 100.4 yesterday and was sent by PCP for CT. CT showed splenomegaly. Pt was instructed to go to ED. Went to [place] ED and was found to have leukocytosis. Wanted to transfer patient to [facility], but no beds were available. Patient reportedly left [place] ED AMA and came to [facility] ED over night. WBC 238K. Suspicious for acute leukemia. He denies any aches, chills, n/v/d, constipation, chest pain, bleeding or bruising. C/o SOB on exertion and mild, non-productive cough. Patient states that he has lost 8 lbs in the last few weeks.  --Peripheral smear was reviewed in the ED which was highly suggestive of CML  --Bone marrow biopsy on 4/25/19: CHRONIC MYELOID LEUKEMIA, BCR-ABL1-POSITIVE, CHRONIC PHASE. FOCAL GRADE 2 RETICULAR FIBROSIS  --BCR-ABL P210 80%      WBC trend:238-->220-->200-->175      PAST MEDICAL HISTORY:   Past Medical History:   Diagnosis Date    Leukocytosis     Pneumothorax     27 yo       PAST SURGICAL HISTORY:   Past Surgical History:   Procedure Laterality Date    Biopsy-bone marrow Left 4/25/2019    Performed by Catalina Rucker MD at [facility] OR [address] [place] [address]    right shoulder surgery      right rotator cuff       PAST SOCIAL HISTORY:   reports that he has never smoked. His smokeless tobacco use includes snuff. He reports that he drinks alcohol. He reports that he does not use drugs.    FAMILY HISTORY:  Family History   Problem Relation Age of Onset    Cancer Mother         Ovarian     Heart disease Neg Hx        CURRENT  MEDICATIONS:   Current Outpatient Medications   Medication Sig    allopurinol (ZYLOPRIM) 300 MG tablet Take 1 tablet (300 mg total) by mouth once daily.    fluticasone (FLONASE) 50 mcg/actuation nasal spray 1 spray by Each Nare route once daily.    hydroxyurea (HYDREA) 500 mg Cap Take 2 capsules (1,000 mg total) by mouth once daily.    ondansetron (ZOFRAN-ODT) 4 MG TbDL Dissolve 2 tablets (8 mg total) by mouth every 6 (six) hours as needed.    traMADol (ULTRAM) 50 mg tablet Take 1 tablet (50 mg total) by mouth every 6 (six) hours as needed for Pain.     No current facility-administered medications for this visit.      ALLERGIES:   Review of patient's allergies indicates:  No Known Allergies      Review of Systems:     Review of Systems   Constitutional: Negative for appetite change, chills, diaphoresis, fatigue, fever and unexpected weight change.   HENT:   Negative for hearing loss, mouth sores, nosebleeds, sore throat, trouble swallowing and voice change.    Eyes: Negative for eye problems and icterus.   Respiratory: Negative for chest tightness, cough, hemoptysis, shortness of breath and wheezing.    Cardiovascular: Negative for chest pain, leg swelling and palpitations.   Gastrointestinal: Negative for abdominal distention, abdominal pain, blood in stool, diarrhea, nausea and vomiting.   Endocrine: Negative for hot flashes.   Genitourinary: Positive for frequency. Negative for bladder incontinence, difficulty urinating, dysuria and hematuria.    Musculoskeletal: Negative for arthralgias, back pain, flank pain, gait problem, myalgias, neck pain and neck stiffness.   Skin: Negative for itching, rash and wound.   Neurological: Negative for dizziness, extremity weakness, gait problem, headaches, numbness, seizures and speech difficulty.   Hematological: Negative for adenopathy. Does not bruise/bleed easily.   Psychiatric/Behavioral: Negative for confusion, depression and sleep disturbance. The patient is not  nervous/anxious.           Physical Exam:     Vitals:    04/30/19 1640   BP: (!) 145/77   Pulse: 95   Resp: 16   Temp: 98.8 °F (37.1 °C)     Physical Exam   Constitutional: He is oriented to person, place, and time. He appears well-developed and well-nourished. No distress.   HENT:   Head: Normocephalic and atraumatic.   Mouth/Throat: Oropharynx is clear and moist. No oropharyngeal exudate.   Eyes: Pupils are equal, round, and reactive to light. Conjunctivae and EOM are normal.   Neck: Normal range of motion. Neck supple. No JVD present. No tracheal deviation present. No thyromegaly present.   Cardiovascular: Normal rate, regular rhythm and normal heart sounds. Exam reveals no friction rub.   No murmur heard.  Pulmonary/Chest: Effort normal and breath sounds normal. No stridor. No respiratory distress. He has no wheezes. He has no rales. He exhibits no tenderness.   Abdominal: Soft. Bowel sounds are normal. He exhibits no distension. There is no tenderness. There is no rebound and no guarding.   + spleen tip palpable   Musculoskeletal: Normal range of motion. He exhibits no edema, tenderness or deformity.   Lymphadenopathy:     He has no axillary adenopathy.   Neurological: He is alert and oriented to person, place, and time. He displays normal reflexes. No cranial nerve deficit or sensory deficit. He exhibits normal muscle tone. Coordination normal.   Skin: Skin is warm and dry. Capillary refill takes less than 2 seconds. No rash noted. He is not diaphoretic. No erythema. No pallor.   Psychiatric: He has a normal mood and affect. His behavior is normal. Judgment and thought content normal.       ECOG Performance Status: (foot note - ECOG PS provided by Eastern Cooperative Oncology Group) 0 - Asymptomatic    Karnofsky Performance Score:  100%- Normal, No Complaints, No Evidence of Disease    Labs:   Lab Results   Component Value Date    .00 (HH) 04/29/2019    HGB 9.2 (L) 04/29/2019    HCT 29.0 (L) 04/29/2019      04/29/2019    ALT 39 04/29/2019    AST 45 (H) 04/29/2019     04/29/2019    K 3.8 04/29/2019     04/29/2019    CREATININE 1.2 04/29/2019    BUN 16 04/29/2019    CO2 27 04/29/2019       Imaging:previous imaging has been reviewed     Assessment and Plan:     Mr. Couch is a 49 year old with CML    CML   --Newly identified on bone marrow biopsy  --Currently on hydrea and allopurinol for cytoreduction and tumor lysis prevention  --Will continue Tues/Friday labs at [facility]  --We discussed initiating therapy of Imatinib 400mg daily, with a goal of of less that 10% BCR-ABL at 6 months    Anemia  --Due to high CML burden, this will improve as the disease is treated    Tobacco Cessation  --5 minutes were spent discussing the health benefits of stopping tobacco dip  --Counseling and guidance were provided    60 minutes were spent face to face with the patient and his  family to discuss the disease, natural history, treatment options and survival statistics. I have provided the patient with an opportunity to ask questions and have all questions answered to his satisfaction.       he will return to clinic in 2 weeks, but knows to call in the interim if symptoms change or should a problem arise.        Catalina Rucker MD  Hematology and Medical Oncology  Bone Marrow Transplant  [facility]

## 2019-05-01 ENCOUNTER — PATIENT MESSAGE (OUTPATIENT)
Dept: HEMATOLOGY/ONCOLOGY | Facility: CLINIC | Age: 49
End: 2019-05-01

## 2019-05-01 LAB
ANNOTATION COMMENT IMP: NORMAL
HOLDF INTERPRETATION: NORMAL
HOLDF REASON FOR REFERRAL: NORMAL
HOLDF RELEASED BY: NORMAL
HOLDF REQUESTED FISH TEST: NORMAL
HOLDF SOURCE: NORMAL
MOL DX INTERP BLD/T QL: NORMAL
REF LAB TEST METHOD: NORMAL
SPECIMEN TYPE: NORMAL

## 2019-05-02 ENCOUNTER — TELEPHONE (OUTPATIENT)
Dept: PHARMACY | Facility: CLINIC | Age: 49
End: 2019-05-02

## 2019-05-02 PROBLEM — C92.10 CML (CHRONIC MYELOCYTIC LEUKEMIA): Status: ACTIVE | Noted: 2019-05-02

## 2019-05-02 PROBLEM — D63.0 ANEMIA IN NEOPLASTIC DISEASE: Status: ACTIVE | Noted: 2019-05-02

## 2019-05-02 LAB
CHROM BANDING METHOD: NORMAL
CHROMOSOME ANALYSIS BM ADDITIONAL INFORMATION: NORMAL
CHROMOSOME ANALYSIS BM RELEASED BY: NORMAL
CHROMOSOME ANALYSIS BM RESULT SUMMARY: NORMAL
CLINICAL CYTOGENETICIST REVIEW: NORMAL
KARYOTYP MAR: NORMAL
REASON FOR REFERRAL (NARRATIVE): NORMAL
REF LAB TEST METHOD: NORMAL
SPECIMEN SOURCE: NORMAL
SPECIMEN: NORMAL

## 2019-05-02 RX ORDER — IMATINIB MESYLATE 400 MG/1
400 TABLET, FILM COATED ORAL DAILY
Qty: 30 TABLET | Refills: 11 | Status: SHIPPED | OUTPATIENT
Start: 2019-05-02 | End: 2019-05-08

## 2019-05-02 NOTE — TELEPHONE ENCOUNTER
Informed patient that [facility] received prescription for imatinib and prior authorization is required.  [facility] will be back in touch once insurance determination is received.

## 2019-05-03 ENCOUNTER — PATIENT MESSAGE (OUTPATIENT)
Dept: HEMATOLOGY/ONCOLOGY | Facility: CLINIC | Age: 49
End: 2019-05-03

## 2019-05-06 ENCOUNTER — PATIENT MESSAGE (OUTPATIENT)
Dept: ADMINISTRATIVE | Facility: OTHER | Age: 49
End: 2019-05-06

## 2019-05-06 ENCOUNTER — TELEPHONE (OUTPATIENT)
Dept: HEMATOLOGY/ONCOLOGY | Facility: CLINIC | Age: 49
End: 2019-05-06

## 2019-05-06 NOTE — TELEPHONE ENCOUNTER
Critical lab reported out from [facility]  ,000  Completed critical value flowsheet and reported to Dr Rucker

## 2019-05-06 NOTE — TELEPHONE ENCOUNTER
DOCUMENTATION ONLY:  Prior authorization for Imatinib Mesylate 400mg Tablet approved from 05/03/2019 to 05/03/2020  Case ID: [id number]    Co-pay: Not able to receive    Patient Assistance IS required. - INDU    FOR DOCUMENTATION ONLY:  Financial Assistance for imatinib Mesylate 400mg approved from 05/06/2019 to 12/31/2020  Source: [employer] Imat[employer] Co-pay Card  BIN: [id number]  ID: [id number]  GRP: [id number]  Co-pay w/ Co-pay Card:$0.00 Jenny GRIGGS

## 2019-05-07 ENCOUNTER — PATIENT MESSAGE (OUTPATIENT)
Dept: HEMATOLOGY/ONCOLOGY | Facility: CLINIC | Age: 49
End: 2019-05-07

## 2019-05-08 ENCOUNTER — TELEPHONE (OUTPATIENT)
Dept: HEMATOLOGY/ONCOLOGY | Facility: CLINIC | Age: 49
End: 2019-05-08

## 2019-05-08 DIAGNOSIS — C92.10 CML (CHRONIC MYELOCYTIC LEUKEMIA): Primary | ICD-10-CM

## 2019-05-08 RX ORDER — IMATINIB MESYLATE 400 MG/1
400 TABLET, FILM COATED ORAL DAILY
Qty: 30 TABLET | Refills: 11 | Status: SHIPPED | OUTPATIENT
Start: 2019-05-08 | End: 2019-08-26

## 2019-05-08 NOTE — TELEPHONE ENCOUNTER
----- Message from Delia Pelayo RN sent at 5/8/2019  3:28 PM CDT -----  Pharmacy needs the ICD 10 code for Rx imatinib (GLEEVEC) 400 MG Tab         Contact:: [phone] option 3-1     Spoke with [facility] and gave ICD 10 code.

## 2019-05-14 ENCOUNTER — LAB VISIT (OUTPATIENT)
Dept: LAB | Facility: HOSPITAL | Age: 49
End: 2019-05-14
Attending: INTERNAL MEDICINE
Payer: COMMERCIAL

## 2019-05-14 ENCOUNTER — TELEPHONE (OUTPATIENT)
Dept: HEMATOLOGY/ONCOLOGY | Facility: CLINIC | Age: 49
End: 2019-05-14

## 2019-05-14 ENCOUNTER — OFFICE VISIT (OUTPATIENT)
Dept: HEMATOLOGY/ONCOLOGY | Facility: CLINIC | Age: 49
End: 2019-05-14
Payer: COMMERCIAL

## 2019-05-14 VITALS
HEIGHT: 69 IN | BODY MASS INDEX: 27.43 KG/M2 | RESPIRATION RATE: 18 BRPM | DIASTOLIC BLOOD PRESSURE: 67 MMHG | TEMPERATURE: 97 F | SYSTOLIC BLOOD PRESSURE: 148 MMHG | WEIGHT: 185.19 LBS | OXYGEN SATURATION: 100 % | HEART RATE: 73 BPM

## 2019-05-14 DIAGNOSIS — C92.10 CML (CHRONIC MYELOCYTIC LEUKEMIA): Primary | ICD-10-CM

## 2019-05-14 DIAGNOSIS — R16.1 SPLENOMEGALY: ICD-10-CM

## 2019-05-14 DIAGNOSIS — C92.10 CML (CHRONIC MYELOCYTIC LEUKEMIA): ICD-10-CM

## 2019-05-14 LAB
ALBUMIN SERPL BCP-MCNC: 4.1 G/DL (ref 3.5–5.2)
ALP SERPL-CCNC: 81 U/L (ref 55–135)
ALT SERPL W/O P-5'-P-CCNC: 31 U/L (ref 10–44)
ANION GAP SERPL CALC-SCNC: 8 MMOL/L (ref 8–16)
ANISOCYTOSIS BLD QL SMEAR: ABNORMAL
AST SERPL-CCNC: 33 U/L (ref 10–40)
BASOPHILS NFR BLD: 3.5 % (ref 0–1.9)
BILIRUB SERPL-MCNC: 0.7 MG/DL (ref 0.1–1)
BLASTS NFR BLD MANUAL: 4.5 %
BUN SERPL-MCNC: 16 MG/DL (ref 6–20)
CALCIUM SERPL-MCNC: 9.3 MG/DL (ref 8.7–10.5)
CHLORIDE SERPL-SCNC: 105 MMOL/L (ref 95–110)
CO2 SERPL-SCNC: 25 MMOL/L (ref 23–29)
CREAT SERPL-MCNC: 1.2 MG/DL (ref 0.5–1.4)
DIFFERENTIAL METHOD: ABNORMAL
EOSINOPHIL NFR BLD: 5.5 % (ref 0–8)
ERYTHROCYTE [DISTWIDTH] IN BLOOD BY AUTOMATED COUNT: 21.9 % (ref 11.5–14.5)
EST. GFR  (AFRICAN AMERICAN): >60 ML/MIN/1.73 M^2
EST. GFR  (NON AFRICAN AMERICAN): >60 ML/MIN/1.73 M^2
GLUCOSE SERPL-MCNC: 80 MG/DL (ref 70–110)
HCT VFR BLD AUTO: 29.1 % (ref 40–54)
HGB BLD-MCNC: 9 G/DL (ref 14–18)
IMM GRANULOCYTES # BLD AUTO: ABNORMAL K/UL (ref 0–0.04)
IMM GRANULOCYTES NFR BLD AUTO: ABNORMAL % (ref 0–0.5)
LYMPHOCYTES NFR BLD: 10.5 % (ref 18–48)
MCH RBC QN AUTO: 31.9 PG (ref 27–31)
MCHC RBC AUTO-ENTMCNC: 30.9 G/DL (ref 32–36)
MCV RBC AUTO: 103 FL (ref 82–98)
METAMYELOCYTES NFR BLD MANUAL: 1.5 %
MONOCYTES NFR BLD: 5 % (ref 4–15)
NEUTROPHILS NFR BLD: 65 % (ref 38–73)
NEUTS BAND NFR BLD MANUAL: 4 %
NRBC BLD-RTO: 4 /100 WBC
OVALOCYTES BLD QL SMEAR: ABNORMAL
PLATELET # BLD AUTO: 163 K/UL (ref 150–350)
PLATELET BLD QL SMEAR: ABNORMAL
PMV BLD AUTO: 10.2 FL (ref 9.2–12.9)
POIKILOCYTOSIS BLD QL SMEAR: SLIGHT
POLYCHROMASIA BLD QL SMEAR: ABNORMAL
POTASSIUM SERPL-SCNC: 4.1 MMOL/L (ref 3.5–5.1)
PROMYELOCYTES NFR BLD MANUAL: 0.5 %
PROT SERPL-MCNC: 7.1 G/DL (ref 6–8.4)
RBC # BLD AUTO: 2.82 M/UL (ref 4.6–6.2)
SODIUM SERPL-SCNC: 138 MMOL/L (ref 136–145)
SPHEROCYTES BLD QL SMEAR: ABNORMAL
URATE SERPL-MCNC: 5.7 MG/DL (ref 3.4–7)
WBC # BLD AUTO: 114.5 K/UL (ref 3.9–12.7)

## 2019-05-14 PROCEDURE — 84550 ASSAY OF BLOOD/URIC ACID: CPT

## 2019-05-14 PROCEDURE — 99999 PR PBB SHADOW E&M-EST. PATIENT-LVL III: ICD-10-PCS | Mod: PBBFAC,,, | Performed by: INTERNAL MEDICINE

## 2019-05-14 PROCEDURE — 85027 COMPLETE CBC AUTOMATED: CPT

## 2019-05-14 PROCEDURE — 80053 COMPREHEN METABOLIC PANEL: CPT

## 2019-05-14 PROCEDURE — 99215 PR OFFICE/OUTPT VISIT, EST, LEVL V, 40-54 MIN: ICD-10-PCS | Mod: S$GLB,,, | Performed by: INTERNAL MEDICINE

## 2019-05-14 PROCEDURE — 36415 COLL VENOUS BLD VENIPUNCTURE: CPT

## 2019-05-14 PROCEDURE — 99999 PR PBB SHADOW E&M-EST. PATIENT-LVL III: CPT | Mod: PBBFAC,,, | Performed by: INTERNAL MEDICINE

## 2019-05-14 PROCEDURE — 85007 BL SMEAR W/DIFF WBC COUNT: CPT

## 2019-05-14 PROCEDURE — 3008F PR BODY MASS INDEX (BMI) DOCUMENTED: ICD-10-PCS | Mod: CPTII,S$GLB,, | Performed by: INTERNAL MEDICINE

## 2019-05-14 PROCEDURE — 99215 OFFICE O/P EST HI 40 MIN: CPT | Mod: S$GLB,,, | Performed by: INTERNAL MEDICINE

## 2019-05-14 PROCEDURE — 3008F BODY MASS INDEX DOCD: CPT | Mod: CPTII,S$GLB,, | Performed by: INTERNAL MEDICINE

## 2019-05-14 NOTE — Clinical Note
1. Cbc,cmp, uric acid weekly at [facility] [each Tuesday]2. See me in 4 weeks with cbc,cmp, uric acid. 5pm apt [on a tues]

## 2019-05-14 NOTE — PROGRESS NOTES
Hematology and Medical Oncology   Follow Up     05/14/2019    Primary Hematologic Diagnosis: CML    History of Present Ilness:   Romeo Couch Jr. (Romeo) is a pleasant 49 y.o.male who was recently diagnosed with CML presents to discuss treatment planning.Folowing hospital discharge he has felt well, denies all pain and has returned to work.    Hematology History:  --Presented to [facility] on 4/23/19. He reported that he went to his PCP with left abdominal pain and temp of 100.4 the day before and was sent by PCP for CT. CT showed splenomegaly. Pt was instructed to go to ED. Went to [place] ED and was found to have leukocytosis. Wanted to transfer patient to [facility], but no beds were available. Patient reportedly left [place] ED AMA and came to [facility] over night. WBC 238K. Suspicious for acute leukemia. He denies any aches, chills, n/v/d, constipation, chest pain, bleeding or bruising. C/o SOB on exertion and mild, non-productive cough. Patient states that he has lost 8 lbs in the last few weeks.  --Peripheral smear was reviewed in the ED which was highly suggestive of CML  --Bone marrow biopsy on 4/25/19: CHRONIC MYELOID LEUKEMIA, BCR-ABL1-POSITIVE, CHRONIC PHASE. FOCAL GRADE 2 RETICULAR FIBROSIS  --BCR-ABL P210 80%  --Began imatinib on Saturday May 11, 2019    WBC trend:238-->220-->200-->175 --> 133k --> 113k    Interval History:  Mr. Couch began imatinib on Saturday morning. 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 He feels the need to pull the truck off to the side of the road and rest. The splenic pain is largely resolved, but on occasion he will experience a sharp sensation.    PAST MEDICAL HISTORY:   Past Medical History:   Diagnosis Date    Leukocytosis     Pneumothorax     29 yo       PAST SURGICAL HISTORY:   Past Surgical History:   Procedure Laterality Date    Biopsy-bone marrow Left 4/25/2019    [facility]    right shoulder surgery      right rotator cuff       PAST SOCIAL HISTORY:   reports that he has never smoked. His smokeless tobacco use includes snuff. He reports that he drinks alcohol. He reports that he does not use drugs.    FAMILY HISTORY:  Family History   Problem Relation Age of Onset    Cancer Mother         Ovarian     Heart disease Neg Hx        CURRENT MEDICATIONS:   Current Outpatient Medications   Medication Sig    allopurinol (ZYLOPRIM) 300 MG tablet Take 1 tablet (300 mg total) by mouth once daily.    fluticasone (FLONASE) 50 mcg/actuation nasal spray 1 spray by Each Nare route once daily.    hydroxyurea (HYDREA) 500 mg Cap Take 2 capsules (1,000 mg total) by mouth once daily.    imatinib (GLEEVEC) 400 MG Tab Take 1 tablet (400 mg total) by mouth once daily.    ondansetron (ZOFRAN-ODT) 4 MG TbDL Dissolve 2 tablets (8 mg total) by mouth every 6 (six) hours as needed.    traMADol (ULTRAM) 50 mg tablet Take 1 tablet (50 mg total) by mouth every 6 (six) hours as needed for Pain.     No current facility-administered medications for this visit.      ALLERGIES:   Review of patient's allergies indicates:  No Known Allergies      Review of Systems:     Review of Systems   Constitutional: Negative for appetite change, chills, diaphoresis, fatigue, fever and unexpected weight change.   HENT:   Negative for hearing loss, mouth sores, nosebleeds, sore throat, trouble swallowing and voice change.    Eyes: Negative for eye problems and icterus.   Respiratory: Negative for chest tightness, cough, hemoptysis, shortness of breath and wheezing.    Cardiovascular: Negative for chest pain, leg swelling and palpitations.   Gastrointestinal: Negative for abdominal distention, abdominal pain, blood in stool, diarrhea, nausea and vomiting.   Endocrine: Negative for hot flashes.   Genitourinary: Positive for frequency. Negative for bladder incontinence, difficulty urinating, dysuria and  hematuria.    Musculoskeletal: Negative for arthralgias, back pain, flank pain, gait problem, myalgias, neck pain and neck stiffness.   Skin: Negative for itching, rash and wound.   Neurological: Negative for dizziness, extremity weakness, gait problem, headaches, numbness, seizures and speech difficulty.   Hematological: Negative for adenopathy. Does not bruise/bleed easily.   Psychiatric/Behavioral: Negative for confusion, depression and sleep disturbance. The patient is not nervous/anxious.           Physical Exam:     Vitals:    05/14/19 1656   BP: (!) 148/67   Pulse: 73   Resp: 18   Temp: 97.4 °F (36.3 °C)     Physical Exam   Constitutional: He is oriented to person, place, and time. He appears well-developed and well-nourished. No distress.   HENT:   Head: Normocephalic and atraumatic.   Mouth/Throat: Oropharynx is clear and moist. No oropharyngeal exudate.   Eyes: Pupils are equal, round, and reactive to light. Conjunctivae and EOM are normal.   Neck: Normal range of motion. Neck supple. No JVD present. No tracheal deviation present. No thyromegaly present.   Cardiovascular: Normal rate, regular rhythm and normal heart sounds. Exam reveals no friction rub.   No murmur heard.  Pulmonary/Chest: Effort normal and breath sounds normal. No stridor. No respiratory distress. He has no wheezes. He has no rales. He exhibits no tenderness.   Abdominal: Soft. Bowel sounds are normal. He exhibits no distension. There is no tenderness. There is no rebound and no guarding.   + spleen tip palpable   Musculoskeletal: Normal range of motion. He exhibits no edema, tenderness or deformity.   Lymphadenopathy:     He has no axillary adenopathy.   Neurological: He is alert and oriented to person, place, and time. He displays normal reflexes. No cranial nerve deficit or sensory deficit. He exhibits normal muscle tone. Coordination normal.   Skin: Skin is warm and dry. Capillary refill takes less than 2 seconds. No rash noted. He is  not diaphoretic. No erythema. No pallor.   Psychiatric: He has a normal mood and affect. His behavior is normal. Judgment and thought content normal.       ECOG Performance Status: (foot note - ECOG PS provided by Eastern Cooperative Oncology Group) 1 - Symptomatic but completely ambulatory    Karnofsky Performance Score:  80%- Normal Activity with Effort: Some Symptoms of Disease    Labs:   Lab Results   Component Value Date    .50 (HH) 05/14/2019    HGB 9.0 (L) 05/14/2019    HCT 29.1 (L) 05/14/2019     05/14/2019    ALT 31 05/14/2019    AST 33 05/14/2019     05/14/2019    K 4.1 05/14/2019     05/14/2019    CREATININE 1.2 05/14/2019    BUN 16 05/14/2019    CO2 25 05/14/2019       Imaging:previous imaging has been reviewed     Assessment and Plan:     Mr. Couch is a 49 year old with CML    CML   --Newly identified on bone marrow biopsy  --Currently on hydrea for cytoreduction with improvement in WBC  --Will discontinue allopurinol  tumor lysis prevention, uric acid is within normal limits  --Will continue weekly labs at [facility]  --Began Imatinib 400mg daily on Sat May 11th, with a goal of of less that 10% BCR-ABL at 6 months    Anemia  --Due to high CML burden, this will improve as the disease is treated    Tobacco Cessation  --5 minutes were spent discussing the health benefits of stopping tobacco dip  --Counseling and guidance were provided    60 minutes were spent face to face with the patient and his  family to discuss the disease, natural history, treatment options and survival statistics. I have provided the patient with an opportunity to ask questions and have all questions answered to his satisfaction.       he will return to clinic in 1 month, but knows to call in the interim if symptoms change or should a problem arise.        Catalina Rucker MD  Hematology and Medical Oncology  Bone Marrow Transplant  [facility]

## 2019-05-15 ENCOUNTER — PATIENT MESSAGE (OUTPATIENT)
Dept: HEMATOLOGY/ONCOLOGY | Facility: CLINIC | Age: 49
End: 2019-05-15

## 2019-05-28 ENCOUNTER — PATIENT MESSAGE (OUTPATIENT)
Dept: HEMATOLOGY/ONCOLOGY | Facility: CLINIC | Age: 49
End: 2019-05-28

## 2019-05-31 ENCOUNTER — PATIENT MESSAGE (OUTPATIENT)
Dept: HEMATOLOGY/ONCOLOGY | Facility: CLINIC | Age: 49
End: 2019-05-31

## 2019-06-11 ENCOUNTER — OFFICE VISIT (OUTPATIENT)
Dept: HEMATOLOGY/ONCOLOGY | Facility: CLINIC | Age: 49
End: 2019-06-11
Payer: COMMERCIAL

## 2019-06-11 VITALS
HEART RATE: 69 BPM | SYSTOLIC BLOOD PRESSURE: 147 MMHG | HEIGHT: 69 IN | RESPIRATION RATE: 16 BRPM | WEIGHT: 190.69 LBS | BODY MASS INDEX: 28.24 KG/M2 | TEMPERATURE: 98 F | OXYGEN SATURATION: 100 % | DIASTOLIC BLOOD PRESSURE: 77 MMHG

## 2019-06-11 DIAGNOSIS — C92.10 CML (CHRONIC MYELOCYTIC LEUKEMIA): Primary | ICD-10-CM

## 2019-06-11 DIAGNOSIS — D63.0 ANEMIA IN NEOPLASTIC DISEASE: ICD-10-CM

## 2019-06-11 PROCEDURE — 99215 OFFICE O/P EST HI 40 MIN: CPT | Mod: S$GLB,,, | Performed by: INTERNAL MEDICINE

## 2019-06-11 PROCEDURE — 3008F PR BODY MASS INDEX (BMI) DOCUMENTED: ICD-10-PCS | Mod: CPTII,S$GLB,, | Performed by: INTERNAL MEDICINE

## 2019-06-11 PROCEDURE — 99999 PR PBB SHADOW E&M-EST. PATIENT-LVL III: CPT | Mod: PBBFAC,,, | Performed by: INTERNAL MEDICINE

## 2019-06-11 PROCEDURE — 3008F BODY MASS INDEX DOCD: CPT | Mod: CPTII,S$GLB,, | Performed by: INTERNAL MEDICINE

## 2019-06-11 PROCEDURE — 99215 PR OFFICE/OUTPT VISIT, EST, LEVL V, 40-54 MIN: ICD-10-PCS | Mod: S$GLB,,, | Performed by: INTERNAL MEDICINE

## 2019-06-11 PROCEDURE — 99999 PR PBB SHADOW E&M-EST. PATIENT-LVL III: ICD-10-PCS | Mod: PBBFAC,,, | Performed by: INTERNAL MEDICINE

## 2019-06-11 NOTE — Clinical Note
1. See me in 1 month [at 5pm spot]2. Labs earlier in the day at [facility]: cbc,cmp, bcr-abl

## 2019-06-11 NOTE — PROGRESS NOTES
Hematology and Medical Oncology   Follow Up     06/11/2019    Primary Hematologic Diagnosis: CML    History of Present Ilness:   Romeo Couch Jr. (Romeo) is a pleasant 49 y.o.male who was recently diagnosed with CML presents to discuss treatment planning.Folowing hospital discharge he has felt well, denies all pain and has returned to work.    Hematology History:  --Presented [place] [facility] [place] on 4/23/19. He reported that he went to his PCP with left abdominal pain and temp of 100.4 the day before and was sent by PCP for CT. CT showed splenomegaly. Pt was instructed to go to ED. Went to [place] ED and was found to have leukocytosis. Wanted to transfer patient [place] [facility], but no beds were available. Patient reportedly left [place] ED AMA and came to [facility] ED over night. WBC 238K. Suspicious for acute leukemia. He denies any aches, chills, n/v/d, constipation, chest pain, bleeding or bruising. C/o SOB on exertion and mild, non-productive cough. Patient states that he has lost 8 lbs in the last few weeks.  --Peripheral smear was reviewed in the ED which was highly suggestive of CML  --Bone marrow biopsy on 4/25/19: CHRONIC MYELOID LEUKEMIA, BCR-ABL1-POSITIVE, CHRONIC PHASE. FOCAL GRADE 2 RETICULAR FIBROSIS  --BCR-ABL P210 80%  --Began imatinib on Saturday May 11, 2019    WBC trend:238-->220-->200-->175 --> 133k --> 113k--> 39k -->7.9k -->4.6k    Interval History:  Mr. Couch began imatinib exactly 1 month ago. He is starting to feel a bit better since the discontinuation of the hydrea and allopurinol. He still has bilateral knee pain and generalized fatigue, despite the tonic water and calcium supplements. He is now taking the medication at night and quickly falls asleep.    PAST MEDICAL HISTORY:   Past Medical History:   Diagnosis Date    Leukocytosis     Pneumothorax     29 yo       PAST SURGICAL HISTORY:   Past Surgical History:   Procedure Laterality Date    Biopsy-bone marrow Left 4/25/2019    [facility]    right shoulder surgery      right rotator cuff       PAST SOCIAL HISTORY:   reports that he has never smoked. His smokeless tobacco use includes snuff. He reports that he drinks alcohol. He reports that he does not use drugs.    FAMILY HISTORY:  Family History   Problem Relation Age of Onset    Cancer Mother         Ovarian     Heart disease Neg Hx        CURRENT MEDICATIONS:   Current Outpatient Medications   Medication Sig    allopurinol (ZYLOPRIM) 300 MG tablet Take 1 tablet (300 mg total) by mouth once daily.    fluticasone (FLONASE) 50 mcg/actuation nasal spray 1 spray by Each Nare route once daily.    hydroxyurea (HYDREA) 500 mg Cap Take 2 capsules (1,000 mg total) by mouth once daily.    imatinib (GLEEVEC) 400 MG Tab Take 1 tablet (400 mg total) by mouth once daily.    ondansetron (ZOFRAN-ODT) 4 MG TbDL Dissolve 2 tablets (8 mg total) by mouth every 6 (six) hours as needed.    traMADol (ULTRAM) 50 mg tablet Take 1 tablet (50 mg total) by mouth every 6 (six) hours as needed for Pain.     No current facility-administered medications for this visit.      ALLERGIES:   Review of patient's allergies indicates:  No Known Allergies      Review of Systems:     Review of Systems   Constitutional: Negative for appetite change, chills, diaphoresis, fatigue, fever and unexpected weight change.   HENT:   Negative for hearing loss, mouth sores, nosebleeds, sore throat, trouble swallowing and voice change.    Eyes: Negative for eye problems and icterus.   Respiratory: Negative for chest tightness, cough, hemoptysis, shortness of breath and wheezing.    Cardiovascular: Negative for chest pain, leg swelling and palpitations.   Gastrointestinal: Negative for abdominal distention, abdominal pain, blood in stool, diarrhea, nausea and vomiting.   Endocrine: Negative for hot flashes.   Genitourinary: Positive for frequency. Negative for bladder incontinence, difficulty urinating,  dysuria and hematuria.    Musculoskeletal: Negative for arthralgias, back pain, flank pain, gait problem, myalgias, neck pain and neck stiffness.   Skin: Negative for itching, rash and wound.   Neurological: Negative for dizziness, extremity weakness, gait problem, headaches, numbness, seizures and speech difficulty.   Hematological: Negative for adenopathy. Does not bruise/bleed easily.   Psychiatric/Behavioral: Negative for confusion, depression and sleep disturbance. The patient is not nervous/anxious.           Physical Exam:     Vitals:    06/11/19 1626   BP: (!) 147/77   Pulse: 69   Resp: 16   Temp: 97.8 °F (36.6 °C)     Physical Exam   Constitutional: He is oriented to person, place, and time. He appears well-developed and well-nourished. No distress.   HENT:   Head: Normocephalic and atraumatic.   Mouth/Throat: Oropharynx is clear and moist. No oropharyngeal exudate.   Eyes: Pupils are equal, round, and reactive to light. Conjunctivae and EOM are normal.   Neck: Normal range of motion. Neck supple. No JVD present. No tracheal deviation present. No thyromegaly present.   Cardiovascular: Normal rate, regular rhythm and normal heart sounds. Exam reveals no friction rub.   No murmur heard.  Pulmonary/Chest: Effort normal and breath sounds normal. No stridor. No respiratory distress. He has no wheezes. He has no rales. He exhibits no tenderness.   Abdominal: Soft. Bowel sounds are normal. He exhibits no distension. There is no tenderness. There is no rebound and no guarding.   Musculoskeletal: Normal range of motion. He exhibits no edema, tenderness or deformity.   Lymphadenopathy:     He has no axillary adenopathy.   Neurological: He is alert and oriented to person, place, and time. He displays normal reflexes. No cranial nerve deficit or sensory deficit. He exhibits normal muscle tone. Coordination normal.   Skin: Skin is warm and dry. Capillary refill takes less than 2 seconds. No rash noted. He is not  diaphoretic. No erythema. No pallor.   Psychiatric: He has a normal mood and affect. His behavior is normal. Judgment and thought content normal.       ECOG Performance Status: (foot note - ECOG PS provided by Eastern Cooperative Oncology Group) 1 - Symptomatic but completely ambulatory    Karnofsky Performance Score:  80%- Normal Activity with Effort: Some Symptoms of Disease    Labs:   Lab Results   Component Value Date    WBC 3.72 (L) 06/11/2019    HGB 10.4 (L) 06/11/2019    HCT 33.3 (L) 06/11/2019     06/11/2019    ALT 25 06/11/2019    AST 26 06/11/2019     06/11/2019    K 4.0 06/11/2019     06/11/2019    CREATININE 1.11 06/11/2019    BUN 15 06/11/2019    CO2 27 06/11/2019       Imaging:previous imaging has been reviewed     Assessment and Plan:     Mr. Couch is a 49 year old with CML    CML   --Began Imatinib 400mg daily on Sat May 11th, with a goal of of less that 10% BCR-ABL at 6 months  --Will check BCR-ABL at the next visit  --Symptom management with calcium supplements and tonic water    Anemia  --Due to high CML burden, this will improve as the disease is treated    Tobacco Cessation  --5 minutes were spent discussing the health benefits of stopping tobacco dip  --Counseling and guidance were provided    60 minutes were spent face to face with the patient and his  family to discuss the disease, natural history, treatment options and survival statistics. I have provided the patient with an opportunity to ask questions and have all questions answered to his satisfaction.       he will return to clinic in 1 month, but knows to call in the interim if symptoms change or should a problem arise.        Catalina Rucker MD  Hematology and Medical Oncology  Bone Marrow Transplant  [facility]

## 2019-06-28 ENCOUNTER — PATIENT MESSAGE (OUTPATIENT)
Dept: HEMATOLOGY/ONCOLOGY | Facility: CLINIC | Age: 49
End: 2019-06-28

## 2019-07-10 ENCOUNTER — TELEPHONE (OUTPATIENT)
Dept: HEMATOLOGY/ONCOLOGY | Facility: CLINIC | Age: 49
End: 2019-07-10

## 2019-07-10 NOTE — TELEPHONE ENCOUNTER
called and spoke with pt to confirm his apt today at 5pm. Patient stated that he spoke with  this morning and she told him to come in on Monday. I told him I would speak with her and we would adjust his apts. He voiced appreciation

## 2019-07-18 ENCOUNTER — PATIENT MESSAGE (OUTPATIENT)
Dept: HEMATOLOGY/ONCOLOGY | Facility: CLINIC | Age: 49
End: 2019-07-18

## 2019-08-12 ENCOUNTER — TELEPHONE (OUTPATIENT)
Dept: HEMATOLOGY/ONCOLOGY | Facility: CLINIC | Age: 49
End: 2019-08-12

## 2019-08-12 NOTE — TELEPHONE ENCOUNTER
Returned call scheduled lab at [facility] on 8/19 and follow up with Dr Rucker on 8/22. Mailed appt slips.        ----- Message from Idalmis Joy sent at 8/12/2019 12:08 PM CDT -----  Pt called to schedule appt w/Dr Rucker  and labs   Callback#[phone]  Thank You  RICHIE Joy

## 2019-08-22 ENCOUNTER — OFFICE VISIT (OUTPATIENT)
Dept: HEMATOLOGY/ONCOLOGY | Facility: CLINIC | Age: 49
End: 2019-08-22
Payer: COMMERCIAL

## 2019-08-22 VITALS
OXYGEN SATURATION: 100 % | WEIGHT: 200.81 LBS | RESPIRATION RATE: 16 BRPM | HEART RATE: 89 BPM | SYSTOLIC BLOOD PRESSURE: 167 MMHG | HEIGHT: 69 IN | TEMPERATURE: 98 F | BODY MASS INDEX: 29.74 KG/M2 | DIASTOLIC BLOOD PRESSURE: 89 MMHG

## 2019-08-22 DIAGNOSIS — C92.10 CML (CHRONIC MYELOCYTIC LEUKEMIA): Primary | ICD-10-CM

## 2019-08-22 DIAGNOSIS — D63.0 ANEMIA IN NEOPLASTIC DISEASE: ICD-10-CM

## 2019-08-22 DIAGNOSIS — R16.1 SPLENOMEGALY: ICD-10-CM

## 2019-08-22 PROCEDURE — 99999 PR PBB SHADOW E&M-EST. PATIENT-LVL III: CPT | Mod: PBBFAC,,, | Performed by: INTERNAL MEDICINE

## 2019-08-22 PROCEDURE — 99215 OFFICE O/P EST HI 40 MIN: CPT | Mod: S$GLB,,, | Performed by: INTERNAL MEDICINE

## 2019-08-22 PROCEDURE — 99215 PR OFFICE/OUTPT VISIT, EST, LEVL V, 40-54 MIN: ICD-10-PCS | Mod: S$GLB,,, | Performed by: INTERNAL MEDICINE

## 2019-08-22 PROCEDURE — 99999 PR PBB SHADOW E&M-EST. PATIENT-LVL III: ICD-10-PCS | Mod: PBBFAC,,, | Performed by: INTERNAL MEDICINE

## 2019-08-22 PROCEDURE — 3008F PR BODY MASS INDEX (BMI) DOCUMENTED: ICD-10-PCS | Mod: CPTII,S$GLB,, | Performed by: INTERNAL MEDICINE

## 2019-08-22 PROCEDURE — 3008F BODY MASS INDEX DOCD: CPT | Mod: CPTII,S$GLB,, | Performed by: INTERNAL MEDICINE

## 2019-08-22 RX ORDER — IMATINIB MESYLATE 400 MG/1
400 TABLET, FILM COATED ORAL DAILY
Qty: 90 TABLET | Refills: 3 | Status: SHIPPED | OUTPATIENT
Start: 2019-08-22 | End: 2019-08-26

## 2019-08-22 RX ORDER — IMATINIB MESYLATE 100 MG/1
200 TABLET, FILM COATED ORAL DAILY
Qty: 180 TABLET | Refills: 3 | Status: SHIPPED | OUTPATIENT
Start: 2019-08-22 | End: 2019-08-26

## 2019-08-22 NOTE — Clinical Note
1. In 1 month cbc,cmp, bcr-abl [he likes labs done at [facility] or [facility]]2. See me 1 week after labs

## 2019-08-22 NOTE — PROGRESS NOTES
Hematology and Medical Oncology   Follow Up     08/22/2019    Primary Hematologic Diagnosis: CML    History of Present Ilness:   Romeo Couch Jr. (Romeo) is a pleasant 49 y.o.male who was recently diagnosed with CML presents to discuss treatment planning.Folowing hospital discharge he has felt well, denies all pain and has returned to work.    Hematology History:  --[place] [facility] [place]. He reported that he went to his PCP with left abdominal pain and temp of 100.4 the day before and was sent by PCP for CT. CT showed splenomegaly. Pt was instructed to go to ED. Went to [place] ED and was found to have leukocytosis. [place] [facility] no beds were available. Patient reportedly left [place] ED AMA and came [place] [facility] [place] night. WBC 238K. Suspicious for acute leukemia. He denies any aches, chills, n/v/d, constipation, chest pain, bleeding or bruising. C/o SOB on exertion and mild, non-productive cough. Patient states that he has lost 8 lbs in the last few weeks.  --Peripheral smear was reviewed in the ED which was highly suggestive of CML  --Bone marrow biopsy on 4/25/19: CHRONIC MYELOID LEUKEMIA, BCR-ABL1-POSITIVE, CHRONIC PHASE. FOCAL GRADE 2 RETICULAR FIBROSIS  --BCR-ABL P210 80%  --Began imatinib on Saturday May 11, 2019    WBC trend:238-->220-->200-->175 --> 133k --> 113k--> 39k -->7.9k -->4.6k    Interval History:  Mr. Couch began imatinib roughly 3 months ago. He continues to feel better than when he initially started the therapy, but does not have enough energy to work a full day. He is no longer put to sleep by taking imatinib. He feels as though he has gained a lot of weight in this time period, in large part due to being sedentary.     PAST MEDICAL HISTORY:   Past Medical History:   Diagnosis Date    Leukocytosis     Pneumothorax     29 yo       PAST SURGICAL HISTORY:   Past Surgical History:   Procedure Laterality Date    Biopsy-bone marrow Left 4/25/2019     Performed by [facility] at [facility] [address] [place] [address]    right shoulder surgery      right rotator cuff       PAST SOCIAL HISTORY:   reports that he has never smoked. His smokeless tobacco use includes snuff. He reports that he drinks alcohol. He reports that he does not use drugs.    FAMILY HISTORY:  Family History   Problem Relation Age of Onset    Cancer Mother         Ovarian     Heart disease Neg Hx        CURRENT MEDICATIONS:   Current Outpatient Medications   Medication Sig    fluticasone (FLONASE) 50 mcg/actuation nasal spray 1 spray by Each Nare route once daily.    imatinib (GLEEVEC) 400 MG Tab Take 1 tablet (400 mg total) by mouth once daily.    ondansetron (ZOFRAN-ODT) 4 MG TbDL Dissolve 2 tablets (8 mg total) by mouth every 6 (six) hours as needed.    traMADol (ULTRAM) 50 mg tablet Take 1 tablet (50 mg total) by mouth every 6 (six) hours as needed for Pain.     No current facility-administered medications for this visit.      ALLERGIES:   Review of patient's allergies indicates:  No Known Allergies      Review of Systems:     Review of Systems   Constitutional: Negative for appetite change, chills, diaphoresis, fatigue, fever and unexpected weight change.   HENT:   Negative for hearing loss, mouth sores, nosebleeds, sore throat, trouble swallowing and voice change.    Eyes: Negative for eye problems and icterus.   Respiratory: Negative for chest tightness, cough, hemoptysis, shortness of breath and wheezing.    Cardiovascular: Negative for chest pain, leg swelling and palpitations.   Gastrointestinal: Negative for abdominal distention, abdominal pain, blood in stool, diarrhea, nausea and vomiting.   Endocrine: Negative for hot flashes.   Genitourinary: Positive for frequency. Negative for bladder incontinence, difficulty urinating, dysuria and hematuria.    Musculoskeletal: Negative for arthralgias, back pain, flank pain, gait problem, myalgias, neck pain and neck stiffness.   Skin: Negative for  itching, rash and wound.   Neurological: Negative for dizziness, extremity weakness, gait problem, headaches, numbness, seizures and speech difficulty.   Hematological: Negative for adenopathy. Does not bruise/bleed easily.   Psychiatric/Behavioral: Negative for confusion, depression and sleep disturbance. The patient is not nervous/anxious.           Physical Exam:     Vitals:    08/22/19 1437   BP: (!) 167/89   Pulse: 89   Resp: 16   Temp: 97.9 °F (36.6 °C)     Physical Exam   Constitutional: He is oriented to person, place, and time. He appears well-developed and well-nourished. No distress.   HENT:   Head: Normocephalic and atraumatic.   Mouth/Throat: Oropharynx is clear and moist. No oropharyngeal exudate.   Eyes: Pupils are equal, round, and reactive to light. Conjunctivae and EOM are normal.   Neck: Normal range of motion. Neck supple. No JVD present. No tracheal deviation present. No thyromegaly present.   Cardiovascular: Normal rate, regular rhythm and normal heart sounds. Exam reveals no friction rub.   No murmur heard.  Pulmonary/Chest: Effort normal and breath sounds normal. No stridor. No respiratory distress. He has no wheezes. He has no rales. He exhibits no tenderness.   Abdominal: Soft. Bowel sounds are normal. He exhibits no distension. There is no tenderness. There is no rebound and no guarding.   Musculoskeletal: Normal range of motion. He exhibits no edema, tenderness or deformity.   Lymphadenopathy:     He has no axillary adenopathy.   Neurological: He is alert and oriented to person, place, and time. He displays normal reflexes. No cranial nerve deficit or sensory deficit. He exhibits normal muscle tone. Coordination normal.   Skin: Skin is warm and dry. Capillary refill takes less than 2 seconds. No rash noted. He is not diaphoretic. No erythema. No pallor.   Psychiatric: He has a normal mood and affect. His behavior is normal. Judgment and thought content normal.       ECOG Performance  Status: (foot note - ECOG PS provided by Eastern Cooperative Oncology Group) 1 - Symptomatic but completely ambulatory    Karnofsky Performance Score:  80%- Normal Activity with Effort: Some Symptoms of Disease    Labs:   Lab Results   Component Value Date    WBC 5.21 08/19/2019    HGB 14.5 08/19/2019    HCT 43.8 08/19/2019     08/19/2019    ALT 37 08/19/2019    AST 49 (H) 08/19/2019     08/19/2019    K 4.4 08/19/2019     08/19/2019    CREATININE 1.00 08/19/2019    BUN 16 08/19/2019    CO2 30 (H) 08/19/2019       Imaging:previous imaging has been reviewed     Assessment and Plan:     Mr. Couch is a 49 year old with CML    CML   --Began Imatinib 400mg daily on Sat May 11th, with a goal of of less that 10% BCR-ABL at 6 months  --BCR-ABL has not decreased as expected in the last 2 months, will increase dosing to 600mg  --Symptom management with calcium supplements and tonic water  --Will continue to check monthly    Anemia  --Due to high CML burden, this will improve as the disease is treated    Tobacco Cessation  --5 minutes were spent discussing the health benefits of stopping tobacco dip  --Counseling and guidance were provided    30 minutes were spent face to face with the patient and his  family to discuss the disease, natural history, treatment options and survival statistics. I have provided the patient with an opportunity to ask questions and have all questions answered to his satisfaction.       he will return to clinic in 1 month, but knows to call in the interim if symptoms change or should a problem arise.        Catalina Rucker MD  Hematology and Medical Oncology  Bone Marrow Transplant  [facility]

## 2019-08-23 ENCOUNTER — TELEPHONE (OUTPATIENT)
Dept: PHARMACY | Facility: CLINIC | Age: 49
End: 2019-08-23

## 2019-08-23 NOTE — TELEPHONE ENCOUNTER
Informed Patient  that [facility] received prescription for Gleevec and prior authorization is required.  [facility] will be back in touch once insurance determination is received.

## 2019-08-26 DIAGNOSIS — C92.10 CML (CHRONIC MYELOCYTIC LEUKEMIA): Primary | ICD-10-CM

## 2019-08-26 RX ORDER — IMATINIB MESYLATE 400 MG/1
400 TABLET, FILM COATED ORAL DAILY
Qty: 90 TABLET | Refills: 11 | Status: SHIPPED | OUTPATIENT
Start: 2019-08-26 | End: 2019-12-20

## 2019-08-26 RX ORDER — IMATINIB MESYLATE 100 MG/1
200 TABLET, FILM COATED ORAL DAILY
Qty: 180 TABLET | Refills: 11 | Status: SHIPPED | OUTPATIENT
Start: 2019-08-26 | End: 2020-01-24 | Stop reason: SDUPTHER

## 2019-08-30 ENCOUNTER — PATIENT MESSAGE (OUTPATIENT)
Dept: HEMATOLOGY/ONCOLOGY | Facility: CLINIC | Age: 49
End: 2019-08-30

## 2019-09-09 ENCOUNTER — PATIENT MESSAGE (OUTPATIENT)
Dept: HEMATOLOGY/ONCOLOGY | Facility: CLINIC | Age: 49
End: 2019-09-09

## 2019-09-11 ENCOUNTER — TELEPHONE (OUTPATIENT)
Dept: HEMATOLOGY/ONCOLOGY | Facility: CLINIC | Age: 49
End: 2019-09-11

## 2019-09-11 NOTE — TELEPHONE ENCOUNTER
Called patient to let him know the appts was moved to lab 9/26 and Dr Rucker on 10/3.        ----- Message from Catalina Rucker MD sent at 9/11/2019  1:23 PM CDT -----  Please call him and explain    ----- Message -----  From: Dulce Duvall  Sent: 9/9/2019  12:22 PM  To: Catalina Rucker MD, Beatrice Luque, DARIUS    Does patient knows we are moving the appts?    ----- Message -----  From: Catalina Rucker MD  Sent: 9/9/2019  12:19 PM  To: Dulce Duvall, Beatrice Luque, DARIUS    Please push his labs from this Thursday to the 26th of sept and then see me 1 weeks after the new labs.

## 2019-09-17 ENCOUNTER — PATIENT MESSAGE (OUTPATIENT)
Dept: HEMATOLOGY/ONCOLOGY | Facility: CLINIC | Age: 49
End: 2019-09-17

## 2019-10-03 ENCOUNTER — OFFICE VISIT (OUTPATIENT)
Dept: HEMATOLOGY/ONCOLOGY | Facility: CLINIC | Age: 49
End: 2019-10-03
Payer: COMMERCIAL

## 2019-10-03 VITALS
RESPIRATION RATE: 16 BRPM | SYSTOLIC BLOOD PRESSURE: 142 MMHG | DIASTOLIC BLOOD PRESSURE: 76 MMHG | HEIGHT: 69 IN | TEMPERATURE: 98 F | OXYGEN SATURATION: 97 % | WEIGHT: 202.19 LBS | HEART RATE: 76 BPM | BODY MASS INDEX: 29.95 KG/M2

## 2019-10-03 DIAGNOSIS — C92.10 CML (CHRONIC MYELOCYTIC LEUKEMIA): Primary | ICD-10-CM

## 2019-10-03 DIAGNOSIS — D63.0 ANEMIA IN NEOPLASTIC DISEASE: ICD-10-CM

## 2019-10-03 PROCEDURE — 99215 OFFICE O/P EST HI 40 MIN: CPT | Mod: S$GLB,,, | Performed by: INTERNAL MEDICINE

## 2019-10-03 PROCEDURE — 3008F BODY MASS INDEX DOCD: CPT | Mod: CPTII,S$GLB,, | Performed by: INTERNAL MEDICINE

## 2019-10-03 PROCEDURE — 99999 PR PBB SHADOW E&M-EST. PATIENT-LVL III: CPT | Mod: PBBFAC,,, | Performed by: INTERNAL MEDICINE

## 2019-10-03 PROCEDURE — 99999 PR PBB SHADOW E&M-EST. PATIENT-LVL III: ICD-10-PCS | Mod: PBBFAC,,, | Performed by: INTERNAL MEDICINE

## 2019-10-03 PROCEDURE — 3008F PR BODY MASS INDEX (BMI) DOCUMENTED: ICD-10-PCS | Mod: CPTII,S$GLB,, | Performed by: INTERNAL MEDICINE

## 2019-10-03 PROCEDURE — 99215 PR OFFICE/OUTPT VISIT, EST, LEVL V, 40-54 MIN: ICD-10-PCS | Mod: S$GLB,,, | Performed by: INTERNAL MEDICINE

## 2019-10-03 NOTE — PROGRESS NOTES
Hematology and Medical Oncology   Follow Up     10/03/2019    Primary Hematologic Diagnosis: CML    History of Present Ilness:   Romeo Couch Jr. (Romeo) is a pleasant 49 y.o.male who was recently diagnosed with CML presents to discuss treatment planning.Folowing hospital discharge he has felt well, denies all pain and has returned to work.    Hematology History:  --[place] [facility] [place] He reported that he went to his PCP with left abdominal pain and temp of 100.4 the day before and was sent by PCP for CT. CT showed splenomegaly. Pt was instructed to go to ED. Went to [place] ED and was found to have leukocytosis. [place] [facility] [place] were available. Patient reportedly left [place] ED AMA and came [place] [facility] [place]. WBC 238K. Suspicious for acute leukemia. He denies any aches, chills, n/v/d, constipation, chest pain, bleeding or bruising. C/o SOB on exertion and mild, non-productive cough. Patient states that he has lost 8 lbs in the last few weeks.  --Peripheral smear was reviewed in the ED which was highly suggestive of CML  --Bone marrow biopsy on 19: CHRONIC MYELOID LEUKEMIA, BCR-ABL1-POSITIVE, CHRONIC PHASE. FOCAL GRADE 2 RETICULAR FIBROSIS  --BCR-ABL P210 80%  --Began imatinib on Saturday May 11, 2019    WBC trend:238-->220-->200-->175 --> 133k --> 113k--> 39k -->7.9k -->4.6k    Interval History:  Mr. Couch has been doing very well on therapy. He is feeling markedly better this week and is ready to return to work. On occasion his spleen feels intermittently enlarged.  His father in law unexpectedly  last night following an infected pacemaker lead.     PAST MEDICAL HISTORY:   Past Medical History:   Diagnosis Date    Leukocytosis     Pneumothorax     29 yo       PAST SURGICAL HISTORY:   Past Surgical History:   Procedure Laterality Date    BONE MARROW BIOPSY Left 2019    Procedure: Biopsy-bone marrow;  Surgeon: Catalina Rucker MD;   Location: [facility] OR [address] [place] [address];  Service: Orthopedics;  Laterality: Left;    right shoulder surgery      right rotator cuff       PAST SOCIAL HISTORY:   reports that he has never smoked. His smokeless tobacco use includes snuff. He reports that he drinks alcohol. He reports that he does not use drugs.    FAMILY HISTORY:  Family History   Problem Relation Age of Onset    Cancer Mother         Ovarian     Heart disease Neg Hx        CURRENT MEDICATIONS:   Current Outpatient Medications   Medication Sig    imatinib (GLEEVEC) 100 MG Tab Take 2 tablets (200 mg total) by mouth once daily with 1 other imatinib prescription for 600 mg total.    imatinib (GLEEVEC) 400 MG Tab Take 1 tablet (400 mg total) by mouth once daily with 1 other imatinib prescription for 600 mg total.     No current facility-administered medications for this visit.      ALLERGIES:   Review of patient's allergies indicates:  No Known Allergies      Review of Systems:     Review of Systems   Constitutional: Negative for appetite change, chills, diaphoresis, fatigue, fever and unexpected weight change.   HENT:   Negative for hearing loss, mouth sores, nosebleeds, sore throat, trouble swallowing and voice change.    Eyes: Negative for eye problems and icterus.   Respiratory: Negative for chest tightness, cough, hemoptysis, shortness of breath and wheezing.    Cardiovascular: Negative for chest pain, leg swelling and palpitations.   Gastrointestinal: Negative for abdominal distention, abdominal pain, blood in stool, diarrhea, nausea and vomiting.   Endocrine: Negative for hot flashes.   Genitourinary: Positive for frequency. Negative for bladder incontinence, difficulty urinating, dysuria and hematuria.    Musculoskeletal: Negative for arthralgias, back pain, flank pain, gait problem, myalgias, neck pain and neck stiffness.   Skin: Negative for itching, rash and wound.   Neurological: Negative for dizziness, extremity weakness, gait problem, headaches,  numbness, seizures and speech difficulty.   Hematological: Negative for adenopathy. Does not bruise/bleed easily.   Psychiatric/Behavioral: Negative for confusion, depression and sleep disturbance. The patient is not nervous/anxious.           Physical Exam:     Vitals:    10/03/19 1123   BP: (!) 142/76   Pulse: 76   Resp: 16   Temp: 98.4 °F (36.9 °C)     Physical Exam   Constitutional: He is oriented to person, place, and time. He appears well-developed and well-nourished. No distress.   HENT:   Head: Normocephalic and atraumatic.   Mouth/Throat: Oropharynx is clear and moist. No oropharyngeal exudate.   Eyes: Pupils are equal, round, and reactive to light. Conjunctivae and EOM are normal.   Neck: Normal range of motion. Neck supple. No JVD present. No tracheal deviation present. No thyromegaly present.   Cardiovascular: Normal rate, regular rhythm and normal heart sounds. Exam reveals no friction rub.   No murmur heard.  Pulmonary/Chest: Effort normal and breath sounds normal. No stridor. No respiratory distress. He has no wheezes. He has no rales. He exhibits no tenderness.   Abdominal: Soft. Bowel sounds are normal. He exhibits no distension. There is no tenderness. There is no rebound and no guarding.   Musculoskeletal: Normal range of motion. He exhibits no edema, tenderness or deformity.   Lymphadenopathy:     He has no axillary adenopathy.   Neurological: He is alert and oriented to person, place, and time. He displays normal reflexes. No cranial nerve deficit or sensory deficit. He exhibits normal muscle tone. Coordination normal.   Skin: Skin is warm and dry. Capillary refill takes less than 2 seconds. No rash noted. He is not diaphoretic. No erythema. No pallor.   Psychiatric: He has a normal mood and affect. His behavior is normal. Judgment and thought content normal.       ECOG Performance Status: (foot note - ECOG PS provided by Eastern Cooperative Oncology Group) 1 - Symptomatic but completely  ambulatory    Karnofsky Performance Score:  80%- Normal Activity with Effort: Some Symptoms of Disease    Labs:   Lab Results   Component Value Date    WBC 5.15 09/26/2019    HGB 13.9 (L) 09/26/2019    HCT 42.3 09/26/2019     09/26/2019    ALT 27 09/26/2019    AST 39 09/26/2019     09/26/2019    K 3.6 09/26/2019     09/26/2019    CREATININE 1.11 09/26/2019    BUN 13 09/26/2019    CO2 31 (H) 09/26/2019       Imaging:previous imaging has been reviewed     Assessment and Plan:     Mr. Couch is a 49 year old with CML    CML   --Began Imatinib 400mg daily on Sat May 11th, with a goal of of less that 10% BCR-ABL at 6 months  --Increased Imatinib to 600mg daily last month with a decrease in BCR-ABL from 60% to 28%  --Symptom management with calcium supplements and tonic water  --Will continue to check monthly    Anemia  --Due to high CML burden  --Now normalized    Tobacco Cessation  --5 minutes were spent discussing the health benefits of stopping tobacco dip  --Counseling and guidance were provided    30 minutes were spent face to face with the patient to discuss the disease, natural history, treatment options and survival statistics. I have provided the patient with an opportunity to ask questions and have all questions answered to his satisfaction.       he will return to clinic in 1 month, but knows to call in the interim if symptoms change or should a problem arise.        Catalina Rucker MD  Hematology and Medical Oncology  Bone Marrow Transplant  [facility]

## 2019-10-15 ENCOUNTER — PATIENT MESSAGE (OUTPATIENT)
Dept: HEMATOLOGY/ONCOLOGY | Facility: CLINIC | Age: 49
End: 2019-10-15

## 2019-10-18 ENCOUNTER — PATIENT MESSAGE (OUTPATIENT)
Dept: HEMATOLOGY/ONCOLOGY | Facility: CLINIC | Age: 49
End: 2019-10-18

## 2019-10-24 ENCOUNTER — PATIENT MESSAGE (OUTPATIENT)
Dept: HEMATOLOGY/ONCOLOGY | Facility: CLINIC | Age: 49
End: 2019-10-24

## 2019-11-04 ENCOUNTER — OFFICE VISIT (OUTPATIENT)
Dept: HEMATOLOGY/ONCOLOGY | Facility: CLINIC | Age: 49
End: 2019-11-04
Payer: COMMERCIAL

## 2019-11-04 VITALS
HEART RATE: 72 BPM | SYSTOLIC BLOOD PRESSURE: 152 MMHG | RESPIRATION RATE: 16 BRPM | BODY MASS INDEX: 30.33 KG/M2 | TEMPERATURE: 98 F | OXYGEN SATURATION: 100 % | DIASTOLIC BLOOD PRESSURE: 78 MMHG | HEIGHT: 69 IN | WEIGHT: 204.81 LBS

## 2019-11-04 DIAGNOSIS — C92.10 CML (CHRONIC MYELOCYTIC LEUKEMIA): Primary | ICD-10-CM

## 2019-11-04 DIAGNOSIS — R16.1 SPLENOMEGALY: ICD-10-CM

## 2019-11-04 DIAGNOSIS — D63.0 ANEMIA IN NEOPLASTIC DISEASE: ICD-10-CM

## 2019-11-04 PROCEDURE — 99999 PR PBB SHADOW E&M-EST. PATIENT-LVL III: CPT | Mod: PBBFAC,,, | Performed by: INTERNAL MEDICINE

## 2019-11-04 PROCEDURE — 99215 OFFICE O/P EST HI 40 MIN: CPT | Mod: S$GLB,,, | Performed by: INTERNAL MEDICINE

## 2019-11-04 PROCEDURE — 99999 PR PBB SHADOW E&M-EST. PATIENT-LVL III: ICD-10-PCS | Mod: PBBFAC,,, | Performed by: INTERNAL MEDICINE

## 2019-11-04 PROCEDURE — 99215 PR OFFICE/OUTPT VISIT, EST, LEVL V, 40-54 MIN: ICD-10-PCS | Mod: S$GLB,,, | Performed by: INTERNAL MEDICINE

## 2019-11-04 PROCEDURE — 3008F BODY MASS INDEX DOCD: CPT | Mod: CPTII,S$GLB,, | Performed by: INTERNAL MEDICINE

## 2019-11-04 PROCEDURE — 3008F PR BODY MASS INDEX (BMI) DOCUMENTED: ICD-10-PCS | Mod: CPTII,S$GLB,, | Performed by: INTERNAL MEDICINE

## 2019-11-05 NOTE — PROGRESS NOTES
Hematology and Medical Oncology   Follow Up     11/04/2019    Primary Hematologic Diagnosis: CML    History of Present Ilness:   Romeo Couch Jr. (Romeo) is a pleasant 49 y.o.male who was recently diagnosed with CML presents to discuss treatment planning.Folowing hospital discharge he has felt well, denies all pain and has returned to work.    Hematology History:  --Presented to [facility] ED on 4/23/19. He reported that he went to his PCP with left abdominal pain and temp of 100.4 the day before and was sent by PCP for CT. CT showed splenomegaly. Pt was instructed to go to ED. Went to [place] ED and was found to have leukocytosis. Wanted to transfer patient to [facility], but no beds were available. Patient reportedly left [place] ED AMA and came to [facility] ED over night. WBC 238K. Suspicious for acute leukemia. He denies any aches, chills, n/v/d, constipation, chest pain, bleeding or bruising. C/o SOB on exertion and mild, non-productive cough. Patient states that he has lost 8 lbs in the last few weeks.  --Peripheral smear was reviewed in the ED which was highly suggestive of CML  --Bone marrow biopsy on 4/25/19: CHRONIC MYELOID LEUKEMIA, BCR-ABL1-POSITIVE, CHRONIC PHASE. FOCAL GRADE 2 RETICULAR FIBROSIS  --BCR-ABL P210 80%  --Began imatinib on Saturday May 11, 2019    WBC trend:238-->220-->200-->175 --> 133k --> 113k--> 39k -->7.9k -->4.6k    Interval History:  Mr. Couch has been doing very well on therapy. He continues to feel well, sometimes he 100% percent feels like his old self, other times he feels a small twinge in his liver. He feels ready to return to work this week.    He was able to play golf both days this weekend.    PAST MEDICAL HISTORY:   Past Medical History:   Diagnosis Date    Leukocytosis     Pneumothorax     29 yo       PAST SURGICAL HISTORY:   Past Surgical History:   Procedure Laterality Date    BONE MARROW BIOPSY Left 4/25/2019    Procedure: Biopsy-bone marrow;  Surgeon: Catalina JACKSON  MD Chaim;  Location: [facility] OR [address] [place] [address];  Service: Orthopedics;  Laterality: Left;    right shoulder surgery      right rotator cuff       PAST SOCIAL HISTORY:   reports that he has never smoked. His smokeless tobacco use includes snuff. He reports that he drinks alcohol. He reports that he does not use drugs.    FAMILY HISTORY:  Family History   Problem Relation Age of Onset    Cancer Mother         Ovarian     Heart disease Neg Hx        CURRENT MEDICATIONS:   Current Outpatient Medications   Medication Sig    imatinib (GLEEVEC) 100 MG Tab Take 2 tablets (200 mg total) by mouth once daily with 1 other imatinib prescription for 600 mg total.    imatinib (GLEEVEC) 400 MG Tab Take 1 tablet (400 mg total) by mouth once daily with 1 other imatinib prescription for 600 mg total.     No current facility-administered medications for this visit.      ALLERGIES:   Review of patient's allergies indicates:  No Known Allergies      Review of Systems:     Review of Systems   Constitutional: Positive for fatigue. Negative for appetite change, chills, diaphoresis, fever and unexpected weight change.   HENT:   Negative for hearing loss, mouth sores, nosebleeds, sore throat, trouble swallowing and voice change.    Eyes: Negative for eye problems and icterus.   Respiratory: Negative for chest tightness, cough, hemoptysis, shortness of breath and wheezing.    Cardiovascular: Negative for chest pain, leg swelling and palpitations.   Gastrointestinal: Negative for abdominal distention, abdominal pain, blood in stool, diarrhea, nausea and vomiting.        + spleen tenderness   Endocrine: Negative for hot flashes.   Genitourinary: Negative for bladder incontinence, difficulty urinating, dysuria, frequency and hematuria.    Musculoskeletal: Negative for arthralgias, back pain, flank pain, gait problem, myalgias, neck pain and neck stiffness.   Skin: Negative for itching, rash and wound.   Neurological: Negative for dizziness,  extremity weakness, gait problem, headaches, numbness, seizures and speech difficulty.   Hematological: Negative for adenopathy. Does not bruise/bleed easily.   Psychiatric/Behavioral: Negative for confusion, depression and sleep disturbance. The patient is not nervous/anxious.           Physical Exam:     Vitals:    11/04/19 1029   BP: (!) 152/78   Pulse: 72   Resp: 16   Temp: 98.1 °F (36.7 °C)     Physical Exam   Constitutional: He is oriented to person, place, and time. He appears well-developed and well-nourished. No distress.   HENT:   Head: Normocephalic and atraumatic.   Mouth/Throat: Oropharynx is clear and moist. No oropharyngeal exudate.   Eyes: Pupils are equal, round, and reactive to light. Conjunctivae and EOM are normal.   Neck: Normal range of motion. Neck supple. No JVD present. No tracheal deviation present. No thyromegaly present.   Cardiovascular: Normal rate, regular rhythm and normal heart sounds. Exam reveals no friction rub.   No murmur heard.  Pulmonary/Chest: Effort normal and breath sounds normal. No stridor. No respiratory distress. He has no wheezes. He has no rales. He exhibits no tenderness.   Abdominal: Soft. Bowel sounds are normal. He exhibits no distension. There is no tenderness. There is no rebound and no guarding.   Musculoskeletal: Normal range of motion. He exhibits no edema, tenderness or deformity.   Lymphadenopathy:     He has no axillary adenopathy.   Neurological: He is alert and oriented to person, place, and time. He displays normal reflexes. No cranial nerve deficit or sensory deficit. He exhibits normal muscle tone. Coordination normal.   Skin: Skin is warm and dry. Capillary refill takes less than 2 seconds. No rash noted. He is not diaphoretic. No erythema. No pallor.   Psychiatric: He has a normal mood and affect. His behavior is normal. Judgment and thought content normal.       ECOG Performance Status: (foot note - ECOG PS provided by Eastern Cooperative Oncology  Group) 1 - Symptomatic but completely ambulatory    Karnofsky Performance Score:  80%- Normal Activity with Effort: Some Symptoms of Disease    Labs:   Lab Results   Component Value Date    WBC 4.98 10/29/2019    HGB 14.3 10/29/2019    HCT 43.3 10/29/2019     10/29/2019    ALT 44 10/29/2019    AST 47 (H) 10/29/2019     10/29/2019    K 4.3 10/29/2019     10/29/2019    CREATININE 0.98 10/29/2019    BUN 13 10/29/2019    CO2 29 10/29/2019       Imaging:previous imaging has been reviewed     Assessment and Plan:     Mr. Couch is a 49 year old with CML    CML   --Began Imatinib 400mg daily on Sat May 11th, with a goal of of less that 10% BCR-ABL at 6 months  --Increased Imatinib to 600mg daily in November with a decrease in BCR-ABL from 60% to 28% to 19%  --Symptom management with calcium supplements and tonic water  --Will continue to check monthly    Anemia  --Due to high CML burden  --Now normalized    Tobacco Cessation  --5 minutes were spent discussing the health benefits of stopping tobacco dip  --Counseling and guidance were provided    30 minutes were spent face to face with the patient to discuss the disease, natural history, treatment options and survival statistics. I have provided the patient with an opportunity to ask questions and have all questions answered to his satisfaction.       he will return to clinic in 1 month, but knows to call in the interim if symptoms change or should a problem arise.        Catalina Rucker MD  Hematology and Medical Oncology  Bone Marrow Transplant  [facility]

## 2019-12-09 ENCOUNTER — OFFICE VISIT (OUTPATIENT)
Dept: HEMATOLOGY/ONCOLOGY | Facility: CLINIC | Age: 49
End: 2019-12-09
Payer: COMMERCIAL

## 2019-12-09 VITALS
TEMPERATURE: 98 F | OXYGEN SATURATION: 99 % | HEIGHT: 69 IN | RESPIRATION RATE: 17 BRPM | WEIGHT: 207.88 LBS | DIASTOLIC BLOOD PRESSURE: 77 MMHG | BODY MASS INDEX: 30.79 KG/M2 | HEART RATE: 78 BPM | SYSTOLIC BLOOD PRESSURE: 149 MMHG

## 2019-12-09 DIAGNOSIS — C92.10 CML (CHRONIC MYELOCYTIC LEUKEMIA): Primary | ICD-10-CM

## 2019-12-09 DIAGNOSIS — F17.200 SMOKER: ICD-10-CM

## 2019-12-09 DIAGNOSIS — R16.1 SPLENOMEGALY: ICD-10-CM

## 2019-12-09 DIAGNOSIS — D63.0 ANEMIA IN NEOPLASTIC DISEASE: ICD-10-CM

## 2019-12-09 PROCEDURE — 3008F PR BODY MASS INDEX (BMI) DOCUMENTED: ICD-10-PCS | Mod: CPTII,S$GLB,, | Performed by: INTERNAL MEDICINE

## 2019-12-09 PROCEDURE — 99215 OFFICE O/P EST HI 40 MIN: CPT | Mod: S$GLB,,, | Performed by: INTERNAL MEDICINE

## 2019-12-09 PROCEDURE — 99215 PR OFFICE/OUTPT VISIT, EST, LEVL V, 40-54 MIN: ICD-10-PCS | Mod: S$GLB,,, | Performed by: INTERNAL MEDICINE

## 2019-12-09 PROCEDURE — 99999 PR PBB SHADOW E&M-EST. PATIENT-LVL III: CPT | Mod: PBBFAC,,, | Performed by: INTERNAL MEDICINE

## 2019-12-09 PROCEDURE — 99999 PR PBB SHADOW E&M-EST. PATIENT-LVL III: ICD-10-PCS | Mod: PBBFAC,,, | Performed by: INTERNAL MEDICINE

## 2019-12-09 PROCEDURE — 3008F BODY MASS INDEX DOCD: CPT | Mod: CPTII,S$GLB,, | Performed by: INTERNAL MEDICINE

## 2019-12-09 NOTE — Clinical Note
[facility] bcr-abl p2102. See me 1 week after labs

## 2019-12-09 NOTE — PROGRESS NOTES
Hematology and Medical Oncology   Follow Up     12/09/2019    Primary Hematologic Diagnosis: CML    History of Present Ilness:   Romeo Couch Jr. (Romeo) is a pleasant 49 y.o.male who was recently diagnosed with CML presents to discuss treatment planning.Folowing hospital discharge he has felt well, denies all pain and has returned to work.    Hematology History:  --Presented to [facility] ED on 4/23/19. He reported that he went to his PCP with left abdominal pain and temp of 100.4 the day before and was sent by PCP for CT. CT showed splenomegaly. Pt was instructed to go to ED. Went to [place] ED and was found to have leukocytosis. Wanted to transfer patient to [facility], but no beds were available. Patient reportedly left [place] ED AMA and came to [facility] ED over night. WBC 238K. Suspicious for acute leukemia. He denies any aches, chills, n/v/d, constipation, chest pain, bleeding or bruising. C/o SOB on exertion and mild, non-productive cough. Patient states that he has lost 8 lbs in the last few weeks.  --Peripheral smear was reviewed in the ED which was highly suggestive of CML  --Bone marrow biopsy on 4/25/19: CHRONIC MYELOID LEUKEMIA, BCR-ABL1-POSITIVE, CHRONIC PHASE. FOCAL GRADE 2 RETICULAR FIBROSIS  --BCR-ABL P210 80%  --Began imatinib on Saturday May 11, 2019    WBC trend:238-->220-->200-->175 --> 133k --> 113k--> 39k -->7.9k -->4.6k    Interval History:  Mr. Couch has been doing very well on therapy. He continues to feel well and has returned to work. He is working with someone, and feels that his stamina is steadily improving. No further splenomegaly discomfort.    He was able to play golf both days this weekend.    PAST MEDICAL HISTORY:   Past Medical History:   Diagnosis Date    Leukocytosis     Pneumothorax     29 yo       PAST SURGICAL HISTORY:   Past Surgical History:   Procedure Laterality Date    BONE MARROW BIOPSY Left 4/25/2019    Procedure: Biopsy-bone marrow;  Surgeon: Catalina Rucker MD;   Location: [facility] OR [address] [place] [address];  Service: Orthopedics;  Laterality: Left;    right shoulder surgery      right rotator cuff       PAST SOCIAL HISTORY:   reports that he has never smoked. His smokeless tobacco use includes snuff. He reports that he drinks alcohol. He reports that he does not use drugs.    FAMILY HISTORY:  Family History   Problem Relation Age of Onset    Cancer Mother         Ovarian     Heart disease Neg Hx        CURRENT MEDICATIONS:   Current Outpatient Medications   Medication Sig    imatinib (GLEEVEC) 100 MG Tab Take 2 tablets (200 mg total) by mouth once daily with 1 other imatinib prescription for 600 mg total.    imatinib (GLEEVEC) 400 MG Tab Take 1 tablet (400 mg total) by mouth once daily with 1 other imatinib prescription for 600 mg total.     No current facility-administered medications for this visit.      ALLERGIES:   Review of patient's allergies indicates:  No Known Allergies      Review of Systems:     Review of Systems   Constitutional: Positive for fatigue. Negative for appetite change, chills, diaphoresis, fever and unexpected weight change.   HENT:   Negative for hearing loss, mouth sores, nosebleeds, sore throat, trouble swallowing and voice change.    Eyes: Negative for eye problems and icterus.   Respiratory: Negative for chest tightness, cough, hemoptysis, shortness of breath and wheezing.    Cardiovascular: Negative for chest pain, leg swelling and palpitations.   Gastrointestinal: Negative for abdominal distention, abdominal pain, blood in stool, diarrhea, nausea and vomiting.   Endocrine: Negative for hot flashes.   Genitourinary: Negative for bladder incontinence, difficulty urinating, dysuria, frequency and hematuria.    Musculoskeletal: Negative for arthralgias, back pain, flank pain, gait problem, myalgias, neck pain and neck stiffness.   Skin: Negative for itching, rash and wound.   Neurological: Negative for dizziness, extremity weakness, gait problem, headaches,  numbness, seizures and speech difficulty.   Hematological: Negative for adenopathy. Does not bruise/bleed easily.   Psychiatric/Behavioral: Negative for confusion, depression and sleep disturbance. The patient is not nervous/anxious.           Physical Exam:     Vitals:    12/09/19 1008   BP: (!) 149/77   Pulse: 78   Resp: 17   Temp: 97.7 °F (36.5 °C)     Physical Exam   Constitutional: He is oriented to person, place, and time. He appears well-developed and well-nourished. No distress.   HENT:   Head: Normocephalic and atraumatic.   Mouth/Throat: Oropharynx is clear and moist. No oropharyngeal exudate.   Eyes: Pupils are equal, round, and reactive to light. Conjunctivae and EOM are normal.   Neck: Normal range of motion. Neck supple. No JVD present. No tracheal deviation present. No thyromegaly present.   Cardiovascular: Normal rate, regular rhythm and normal heart sounds. Exam reveals no friction rub.   No murmur heard.  Pulmonary/Chest: Effort normal and breath sounds normal. No stridor. No respiratory distress. He has no wheezes. He has no rales. He exhibits no tenderness.   Abdominal: Soft. Bowel sounds are normal. He exhibits no distension. There is no tenderness. There is no rebound and no guarding.   Musculoskeletal: Normal range of motion. He exhibits no edema, tenderness or deformity.   Lymphadenopathy:     He has no axillary adenopathy.   Neurological: He is alert and oriented to person, place, and time. He displays normal reflexes. No cranial nerve deficit or sensory deficit. He exhibits normal muscle tone. Coordination normal.   Skin: Skin is warm and dry. Capillary refill takes less than 2 seconds. No rash noted. He is not diaphoretic. No erythema. No pallor.   Psychiatric: He has a normal mood and affect. His behavior is normal. Judgment and thought content normal.       ECOG Performance Status: (foot note - ECOG PS provided by Eastern Cooperative Oncology Group) 1 - Symptomatic but completely  ambulatory    Karnofsky Performance Score:  80%- Normal Activity with Effort: Some Symptoms of Disease    Labs:   Lab Results   Component Value Date    WBC 4.71 12/02/2019    HGB 13.0 (L) 12/02/2019    HCT 38.7 (L) 12/02/2019     12/02/2019    ALT 60 (H) 12/02/2019    AST 56 (H) 12/02/2019     12/02/2019    K 4.4 12/02/2019     12/02/2019    CREATININE 1.03 12/02/2019    BUN 11 12/02/2019    CO2 31 (H) 12/02/2019       Imaging:previous imaging has been reviewed     Assessment and Plan:     Mr. Couch is a 49 year old with CML    CML   --Began Imatinib 400mg daily on Sat May 11th, with a goal of of less that 10% BCR-ABL at 6 months  --Increased Imatinib to 600mg daily in November with a decrease in BCR-ABL from 60% to 28% to 19% to 10%  --Symptom management with calcium supplements and tonic water  --Will continue to check monthly    Anemia  --Due to high CML burden  --Now normalized    Tobacco Cessation  --5 minutes were spent discussing the health benefits of stopping tobacco dip  --Counseling and guidance were provided    30 minutes were spent face to face with the patient to discuss the disease, natural history, treatment options and survival statistics. I have provided the patient with an opportunity to ask questions and have all questions answered to his satisfaction.       he will return to clinic in 1 month, but knows to call in the interim if symptoms change or should a problem arise.        Catalina Rucker MD  Hematology and Medical Oncology  Bone Marrow Transplant  [facility]

## 2019-12-19 ENCOUNTER — PATIENT MESSAGE (OUTPATIENT)
Dept: HEMATOLOGY/ONCOLOGY | Facility: CLINIC | Age: 49
End: 2019-12-19

## 2019-12-19 DIAGNOSIS — C92.10 CML (CHRONIC MYELOCYTIC LEUKEMIA): Primary | ICD-10-CM

## 2019-12-19 DIAGNOSIS — C92.10 CML (CHRONIC MYELOCYTIC LEUKEMIA): ICD-10-CM

## 2019-12-20 RX ORDER — IMATINIB MESYLATE 400 MG/1
TABLET, FILM COATED ORAL
Qty: 30 TABLET | Refills: 3 | Status: SHIPPED | OUTPATIENT
Start: 2019-12-20 | End: 2019-12-20 | Stop reason: SDUPTHER

## 2019-12-20 RX ORDER — IMATINIB MESYLATE 400 MG/1
TABLET, FILM COATED ORAL
Qty: 30 TABLET | Refills: 3 | Status: SHIPPED | OUTPATIENT
Start: 2019-12-20 | End: 2020-01-24 | Stop reason: SDUPTHER

## 2019-12-20 RX ORDER — IMATINIB MESYLATE 400 MG/1
TABLET, FILM COATED ORAL
Qty: 30 TABLET | Refills: 3 | Status: SHIPPED | OUTPATIENT
Start: 2019-12-20 | End: 2019-12-20

## 2019-12-20 RX ORDER — IMATINIB MESYLATE 400 MG/1
TABLET, FILM COATED ORAL
Qty: 30 TABLET | Refills: 3 | OUTPATIENT
Start: 2019-12-20

## 2020-01-08 ENCOUNTER — PATIENT MESSAGE (OUTPATIENT)
Dept: HEMATOLOGY/ONCOLOGY | Facility: CLINIC | Age: 50
End: 2020-01-08

## 2020-01-14 ENCOUNTER — OFFICE VISIT (OUTPATIENT)
Dept: HEMATOLOGY/ONCOLOGY | Facility: CLINIC | Age: 50
End: 2020-01-14
Payer: COMMERCIAL

## 2020-01-14 VITALS
DIASTOLIC BLOOD PRESSURE: 78 MMHG | BODY MASS INDEX: 30.98 KG/M2 | SYSTOLIC BLOOD PRESSURE: 154 MMHG | HEIGHT: 69 IN | RESPIRATION RATE: 17 BRPM | WEIGHT: 209.19 LBS | TEMPERATURE: 99 F | HEART RATE: 69 BPM | OXYGEN SATURATION: 99 %

## 2020-01-14 DIAGNOSIS — F17.200 SMOKER: ICD-10-CM

## 2020-01-14 DIAGNOSIS — R10.9 LEFT SIDED ABDOMINAL PAIN: ICD-10-CM

## 2020-01-14 DIAGNOSIS — C92.10 CML (CHRONIC MYELOCYTIC LEUKEMIA): Primary | ICD-10-CM

## 2020-01-14 DIAGNOSIS — R16.1 SPLENOMEGALY: ICD-10-CM

## 2020-01-14 DIAGNOSIS — D63.0 ANEMIA IN NEOPLASTIC DISEASE: ICD-10-CM

## 2020-01-14 PROCEDURE — 99215 OFFICE O/P EST HI 40 MIN: CPT | Mod: S$GLB,,, | Performed by: INTERNAL MEDICINE

## 2020-01-14 PROCEDURE — 3008F BODY MASS INDEX DOCD: CPT | Mod: CPTII,S$GLB,, | Performed by: INTERNAL MEDICINE

## 2020-01-14 PROCEDURE — 99999 PR PBB SHADOW E&M-EST. PATIENT-LVL III: CPT | Mod: PBBFAC,,, | Performed by: INTERNAL MEDICINE

## 2020-01-14 PROCEDURE — 99215 PR OFFICE/OUTPT VISIT, EST, LEVL V, 40-54 MIN: ICD-10-PCS | Mod: S$GLB,,, | Performed by: INTERNAL MEDICINE

## 2020-01-14 PROCEDURE — 99999 PR PBB SHADOW E&M-EST. PATIENT-LVL III: ICD-10-PCS | Mod: PBBFAC,,, | Performed by: INTERNAL MEDICINE

## 2020-01-14 PROCEDURE — 3008F PR BODY MASS INDEX (BMI) DOCUMENTED: ICD-10-PCS | Mod: CPTII,S$GLB,, | Performed by: INTERNAL MEDICINE

## 2020-01-14 NOTE — PROGRESS NOTES
Hematology and Medical Oncology   Follow Up     01/14/2020    Primary Hematologic Diagnosis: CML    History of Present Ilness:   Romeo Couch Jr. (Romeo) is a pleasant 50 y.o.male who was recently diagnosed with CML presents to discuss treatment planning.Folowing hospital discharge he has felt well, denies all pain and has returned to work.    Hematology History:  --Presented to [facility] ED on 4/23/19. He reported that he went to his PCP with left abdominal pain and temp of 100.4 the day before and was sent by PCP for CT. CT showed splenomegaly. Pt was instructed to go to ED. Went to [facility] ED and was found to have leukocytosis. Wanted to transfer patient to [facility], but no beds were available. Patient reportedly left [facility] ED AMA and came to [facility] ED over night. WBC 238K. Suspicious for acute leukemia. He denies any aches, chills, n/v/d, constipation, chest pain, bleeding or bruising. C/o SOB on exertion and mild, non-productive cough. Patient states that he has lost 8 lbs in the last few weeks.  --Peripheral smear was reviewed in the ED which was highly suggestive of CML  --Bone marrow biopsy on 4/25/19: CHRONIC MYELOID LEUKEMIA, BCR-ABL1-POSITIVE, CHRONIC PHASE. FOCAL GRADE 2 RETICULAR FIBROSIS  --BCR-ABL P210 80%  --Began imatinib on Saturday May 11, 2019    WBC trend:238-->220-->200-->175 --> 133k --> 113k--> 39k -->7.9k -->4.6k    Interval History:  Mr. Couch has been doing very well on therapy. He continues to feel well and has returned to work. He is working with someone, and feels that his stamina is steadily improving. No further splenomegaly discomfort.    On occasion he has side pain when he is lifting [employer] water bottles.     PAST MEDICAL HISTORY:   Past Medical History:   Diagnosis Date    Leukocytosis     Pneumothorax     27 yo       PAST SURGICAL HISTORY:   Past Surgical History:   Procedure Laterality Date    BONE MARROW BIOPSY Left 4/25/2019    Procedure: Biopsy-bone marrow;  Surgeon:  Catalina Rucker MD;  Location: [facility] OR [address] [place] [address];  Service: Orthopedics;  Laterality: Left;    right shoulder surgery      right rotator cuff       PAST SOCIAL HISTORY:   reports that he has never smoked. His smokeless tobacco use includes snuff. He reports that he drinks alcohol. He reports that he does not use drugs.    FAMILY HISTORY:  Family History   Problem Relation Age of Onset    Cancer Mother         Ovarian     Heart disease Neg Hx        CURRENT MEDICATIONS:   Current Outpatient Medications   Medication Sig    imatinib (GLEEVEC) 100 MG Tab Take 2 tablets (200 mg total) by mouth once daily with 1 other imatinib prescription for 600 mg total.    imatinib (GLEEVEC) 400 MG Tab TAKE 1 TABLET BY MOUTH ONCE DAILY AT THE SAME TIME WITH FOOD AND A LARGE GLASS OF WATER. TAKE WITH TWO 100MG TABLETS FOR A TOTAL DOSE OF 600MG. AVOID GRAPEFRUIT PRODUCTS..     No current facility-administered medications for this visit.      ALLERGIES:   Review of patient's allergies indicates:  No Known Allergies      Review of Systems:     Review of Systems   Constitutional: Positive for fatigue. Negative for appetite change, chills, diaphoresis, fever and unexpected weight change.   HENT:   Negative for hearing loss, mouth sores, nosebleeds, sore throat, trouble swallowing and voice change.    Eyes: Negative for eye problems and icterus.   Respiratory: Negative for chest tightness, cough, hemoptysis, shortness of breath and wheezing.    Cardiovascular: Negative for chest pain, leg swelling and palpitations.   Gastrointestinal: Negative for abdominal distention, abdominal pain, blood in stool, diarrhea, nausea and vomiting.   Endocrine: Negative for hot flashes.   Genitourinary: Negative for bladder incontinence, difficulty urinating, dysuria, frequency and hematuria.    Musculoskeletal: Negative for arthralgias, back pain, flank pain, gait problem, myalgias, neck pain and neck stiffness.   Skin: Negative for itching, rash and  wound.   Neurological: Negative for dizziness, extremity weakness, gait problem, headaches, numbness, seizures and speech difficulty.   Hematological: Negative for adenopathy. Does not bruise/bleed easily.   Psychiatric/Behavioral: Negative for confusion, depression and sleep disturbance. The patient is not nervous/anxious.           Physical Exam:     Vitals:    01/14/20 0810   BP: (!) 154/78   Pulse: 69   Resp: 17   Temp: 98.5 °F (36.9 °C)     Physical Exam   Constitutional: He is oriented to person, place, and time. He appears well-developed and well-nourished. No distress.   HENT:   Head: Normocephalic and atraumatic.   Mouth/Throat: Oropharynx is clear and moist. No oropharyngeal exudate.   Eyes: Pupils are equal, round, and reactive to light. Conjunctivae and EOM are normal.   Neck: Normal range of motion. Neck supple. No JVD present. No tracheal deviation present. No thyromegaly present.   Cardiovascular: Normal rate, regular rhythm and normal heart sounds. Exam reveals no friction rub.   No murmur heard.  Pulmonary/Chest: Effort normal and breath sounds normal. No stridor. No respiratory distress. He has no wheezes. He has no rales. He exhibits no tenderness.   Abdominal: Soft. Bowel sounds are normal. He exhibits no distension. There is no tenderness. There is no rebound and no guarding.   Musculoskeletal: Normal range of motion. He exhibits no edema, tenderness or deformity.   Lymphadenopathy:     He has no axillary adenopathy.   Neurological: He is alert and oriented to person, place, and time. He displays normal reflexes. No cranial nerve deficit or sensory deficit. He exhibits normal muscle tone. Coordination normal.   Skin: Skin is warm and dry. Capillary refill takes less than 2 seconds. No rash noted. He is not diaphoretic. No erythema. No pallor.   Psychiatric: He has a normal mood and affect. His behavior is normal. Judgment and thought content normal.       ECOG Performance Status: (foot note -  ECOG PS provided by Eastern Cooperative Oncology Group) 1 - Symptomatic but completely ambulatory    Karnofsky Performance Score:  80%- Normal Activity with Effort: Some Symptoms of Disease    Labs:   Lab Results   Component Value Date    WBC 4.77 01/06/2020    HGB 13.4 (L) 01/06/2020    HCT 40.4 01/06/2020     01/06/2020    ALT 88 (H) 01/06/2020    AST 71 (H) 01/06/2020     01/06/2020    K 4.6 01/06/2020     01/06/2020    CREATININE 1.15 01/06/2020    BUN 14 01/06/2020    CO2 31 (H) 01/06/2020       Imaging:previous imaging has been reviewed     Assessment and Plan:     Mr. Couch is a 50 year old with CML    CML   --Began Imatinib 400mg daily on Sat May 11th, with a goal of of less that 10% BCR-ABL at 6 months  --Increased Imatinib to 600mg daily in November with a decrease in BCR-ABL from 60% to 28% to 19% to 10% and further to 5%  --Symptom management with calcium supplements and tonic water  --Will check labs q 3 months    Anemia  --Due to high CML burden  --Now normalized    Tobacco Cessation  --5 minutes were spent discussing the health benefits of stopping tobacco dip  --Counseling and guidance were provided    30 minutes were spent face to face with the patient to discuss the disease, natural history, treatment options and survival statistics. I have provided the patient with an opportunity to ask questions and have all questions answered to his satisfaction.       he will return to clinic in 3 months with labs 1 week earlier, but knows to call in the interim if symptoms change or should a problem arise.        Catalina Rucker MD  Hematology and Medical Oncology  Bone Marrow Transplant  [facility]

## 2020-01-23 ENCOUNTER — PATIENT MESSAGE (OUTPATIENT)
Dept: HEMATOLOGY/ONCOLOGY | Facility: CLINIC | Age: 50
End: 2020-01-23

## 2020-01-24 ENCOUNTER — TELEPHONE (OUTPATIENT)
Dept: PHARMACY | Facility: CLINIC | Age: 50
End: 2020-01-24

## 2020-01-24 DIAGNOSIS — C92.10 CML (CHRONIC MYELOCYTIC LEUKEMIA): ICD-10-CM

## 2020-01-24 RX ORDER — IMATINIB MESYLATE 100 MG/1
200 TABLET, FILM COATED ORAL DAILY
Qty: 180 TABLET | Refills: 11 | Status: SHIPPED | OUTPATIENT
Start: 2020-01-24 | End: 2021-02-17 | Stop reason: SDUPTHER

## 2020-01-24 RX ORDER — IMATINIB MESYLATE 400 MG/1
TABLET, FILM COATED ORAL
Qty: 30 TABLET | Refills: 3 | Status: SHIPPED | OUTPATIENT
Start: 2020-01-24 | End: 2020-05-21

## 2020-01-28 NOTE — TELEPHONE ENCOUNTER
DOCUMENTATION ONLY:  Prior authorization for Gleevec 100 mg Tablet #60/30 approved from 01/25/2020 to 01/24/2021  Case ID: [id number]    Co-pay: $250.00    Patient Assistance IS required. Sending to the financial assistance team to investigate assistance options. - CAZ    DOCUMENTATION ONLY:  Prior authorization for Gleevec 400 mg Tablet #30/30 approved from 01/25/2020 to 01/24/2021  Case ID: [id number]    Co-pay: $250.00    Patient Assistance IS required. Sending to the financial assistance team to investigate assistance options. - CAZ      FOR DOCUMENTATION ONLY:  Financial Assistance for Gleevec approved from 01/28/2020 to 12/31/2020  Source: [employer] Gleevec Co-pay Card  BIN: [id number]  PCN: [id number]  ID: [id number]  GRP: [id number]  Co-pay with co-pay card:$10.00 each - CAZ

## 2020-01-29 RX ORDER — MULTIVITAMIN
1 TABLET ORAL DAILY
COMMUNITY
End: 2021-05-18

## 2020-01-29 NOTE — TELEPHONE ENCOUNTER
Full initial Gleevec consult completed on 2020. Gleevec (imatinib) will be shipped on 2020 to arrive at patient's home on 2020 via [employer]. $ 20.00 copay. Patient will start Gleevec on 2020. Address confirmed, CC on file. Confirmed 2 patient identifiers - name and . Therapy Appropriate.     Patient was counseled on the administration directions for Gleevec:  Take 1 - 400mg WITH 2 - 100mg tablets by mouth for a total of 600mg once daily, at the same time.  Patient currently takes with dinner.  -Do not chew, crush, or break the tablets.   If possible, patient was instructed tip the tablets from the RX bottle to the cap, and take directly from the cap to the mouth.  Patient may handle the medication with their hands if they wear with a latex or nitrile glove and wash their hands before and after handling the tablets.    Patient declined to talk about side effects as he currently tolerates the medication very well.     DDIs: Confirmed he takes no other medications daily other than a multivitamin, which has no DDI with Gleevec.    Patient was given 2 patient education handouts on how to handle oral chemotherapy and specific recommendations- do's and don'ts. Instructed the patient that if they have any remaining oral chemotherapy, not to flush down the toilet or throw away in the trash; the patient or caregiver should return the unused oral chemotherapy to either the clinic or call the pharmacy for instructions on how to dispose of the oral chemotherapy properly.     Patient confirmed understanding. Patient does not have any further questions. Will follow up in about 1 week to see how he is doing and in 3 weeks to confirm readiness for refill.

## 2020-01-29 NOTE — TELEPHONE ENCOUNTER
Call attempt #1.  [facility] for Gleevec initial consultation and shipment.  Copay $10.00 - patient is not new to therapy.

## 2020-02-07 NOTE — TELEPHONE ENCOUNTER
Unable to reach patient for Gleevec 7-day touchbase. Patient is not new to Gleevec therapy - this is a continuation of therapy. Patient was previously filling with [facility], but with insurance change, he was now able to fill with [facility].

## 2020-02-26 ENCOUNTER — TELEPHONE (OUTPATIENT)
Dept: PHARMACY | Facility: CLINIC | Age: 50
End: 2020-02-26

## 2020-02-27 ENCOUNTER — TELEPHONE (OUTPATIENT)
Dept: PHARMACY | Facility: CLINIC | Age: 50
End: 2020-02-27

## 2020-02-27 NOTE — TELEPHONE ENCOUNTER
RX call attempt 1 regarding Gleevec 400mg and 100mg refills from [facility]. Patient reached-- shipping out  for  arrival with patients consent. Copay of $150.00 for 400mg charging COCOF (0840) and $150.00 copay charging CCOF (4901) @ 004. Must be charged separately for financial assistance reasoning. Address and  confirmed. Patient has 3 days of medication on hand at this time to last him till , 3/1. Patient has not started any new medications, has had no missed doses and no side effects present. Patient is currently taking the medication as directed by doctors instruction: Gleevec 100mg- Take 2 tablets (200 mg total) by mouth once daily with 1 other imatinib prescription./ Gleevec 400mg- Take 1 tablet by mouth once daily at the same time with food and a large glass of water. Take with two 100mg tablets for a total dose of 600mg. Avoid grapefruit products.  Patient does have a safe place in their residence to keep medication at desired temperature away from small children and pets. Patient also does have the capability of contacting 911 in the event of an emergency. Patient states they do not have any questions or concerns at this time.

## 2020-03-13 ENCOUNTER — PATIENT MESSAGE (OUTPATIENT)
Dept: HEMATOLOGY/ONCOLOGY | Facility: CLINIC | Age: 50
End: 2020-03-13

## 2020-03-16 ENCOUNTER — PATIENT MESSAGE (OUTPATIENT)
Dept: HEMATOLOGY/ONCOLOGY | Facility: CLINIC | Age: 50
End: 2020-03-16

## 2020-03-24 ENCOUNTER — TELEPHONE (OUTPATIENT)
Dept: PHARMACY | Facility: CLINIC | Age: 50
End: 2020-03-24

## 2020-03-24 NOTE — TELEPHONE ENCOUNTER
Refill call regarding Gleecec 400 and 100 mg. Will prepare for shipment with consent of patient on 3/26 to arrive 3/27. Copay 300.00 total with a one time use CC that will be provided with each fill. Patient has not started any new medications including OTC drugs. Patient has not had any medication/ dose or instruction changes. No new allergies or side effects reported with this shipment. Medication is being taken as prescribed by physician and properly stored. Two patient identifiers:  and Address verified. Patient states that he has 7 days of medication on hand.

## 2020-04-08 ENCOUNTER — TELEPHONE (OUTPATIENT)
Dept: HEMATOLOGY/ONCOLOGY | Facility: CLINIC | Age: 50
End: 2020-04-08

## 2020-04-08 NOTE — TELEPHONE ENCOUNTER
----- Message from Regan Noguera sent at 4/8/2020 11:45 AM CDT -----  Contact: Patient  Patient is returning your call  Please be advised    Patient can be reached at    [phone]

## 2020-04-09 ENCOUNTER — TELEPHONE (OUTPATIENT)
Dept: HEMATOLOGY/ONCOLOGY | Facility: CLINIC | Age: 50
End: 2020-04-09

## 2020-04-09 NOTE — TELEPHONE ENCOUNTER
"----- Message from Naldo Jacky sent at 4/9/2020 10:27 AM CDT -----  Contact: Romeo  Returning a Missed Scheduling Call    Contact Preference: [phone]  Patient returning phone call to: Cleveland Valiente MA        Appointment Type:  Provider: Catalina Rucker MD   Does patient feel the need to be seen today? No    Additional Notes:    "Thank you for all that you do for our patients'"  "

## 2020-04-14 ENCOUNTER — OFFICE VISIT (OUTPATIENT)
Dept: HEMATOLOGY/ONCOLOGY | Facility: CLINIC | Age: 50
End: 2020-04-14
Payer: COMMERCIAL

## 2020-04-14 ENCOUNTER — PATIENT MESSAGE (OUTPATIENT)
Dept: HEMATOLOGY/ONCOLOGY | Facility: CLINIC | Age: 50
End: 2020-04-14

## 2020-04-14 DIAGNOSIS — D72.829 LEUKOCYTOSIS, UNSPECIFIED TYPE: ICD-10-CM

## 2020-04-14 DIAGNOSIS — R16.1 SPLENOMEGALY: ICD-10-CM

## 2020-04-14 DIAGNOSIS — R03.0 ELEVATED BLOOD PRESSURE READING: ICD-10-CM

## 2020-04-14 DIAGNOSIS — J30.2 SEASONAL ALLERGIC RHINITIS, UNSPECIFIED TRIGGER: ICD-10-CM

## 2020-04-14 DIAGNOSIS — C92.10 CML (CHRONIC MYELOCYTIC LEUKEMIA): Primary | ICD-10-CM

## 2020-04-14 DIAGNOSIS — R10.9 LEFT SIDED ABDOMINAL PAIN: ICD-10-CM

## 2020-04-14 PROCEDURE — 99215 PR OFFICE/OUTPT VISIT, EST, LEVL V, 40-54 MIN: ICD-10-PCS | Mod: 95,,, | Performed by: INTERNAL MEDICINE

## 2020-04-14 PROCEDURE — 99215 OFFICE O/P EST HI 40 MIN: CPT | Mod: 95,,, | Performed by: INTERNAL MEDICINE

## 2020-04-14 NOTE — PROGRESS NOTES
Hematology and Medical Oncology   Follow Up --Virtual Visit     04/14/2020    Primary Hematologic Diagnosis: CML    The patient location is: home  The chief complaint leading to consultation is: CML  Visit type: Virtual visit with synchronous audio   Total time spent with patient:30  Each patient to whom he or she provides medical services by telemedicine is:  (1) informed of the relationship between the physician and patient and the respective role of any other health care provider with respect to management of the patient; and (2) notified that he or she may decline to receive medical services by telemedicine and may withdraw from such care at any time.      History of Present Ilness:   Romeo Couch Jr. (Romeo) is a pleasant 50 y.o.male who was recently diagnosed with CML presents to discuss treatment planning.Folowing hospital discharge he has felt well, denies all pain and has returned to work.    Hematology History:  --Presented to [facility] ED on 4/23/19. He reported that he went to his PCP with left abdominal pain and temp of 100.4 the day before and was sent by PCP for CT. CT showed splenomegaly. Pt was instructed to go to ED. Went to [facility] ED and was found to have leukocytosis. Wanted to transfer patient to [facility], but no beds were available. Patient reportedly left [facility] ED AMA and came to [facility] ED over night. WBC 238K. Suspicious for acute leukemia. He denies any aches, chills, n/v/d, constipation, chest pain, bleeding or bruising. C/o SOB on exertion and mild, non-productive cough. Patient states that he has lost 8 lbs in the last few weeks.  --Peripheral smear was reviewed in the ED which was highly suggestive of CML  --Bone marrow biopsy on 4/25/19: CHRONIC MYELOID LEUKEMIA, BCR-ABL1-POSITIVE, CHRONIC PHASE. FOCAL GRADE 2 RETICULAR FIBROSIS  --BCR-ABL P210 80%  --Began imatinib on Saturday May 11, 2019    WBC trend:238-->220-->200-->175 --> 133k --> 113k--> 39k -->7.9k -->4.6k    Interval  History:  Mr. Couch has been staying home for the last month during the COVID pandemic. His energy level remains good, but he gets tired by the end of the night. The splenic discomfort has completely resolved.         PAST MEDICAL HISTORY:   Past Medical History:   Diagnosis Date    Leukocytosis     Pneumothorax     29 yo       PAST SURGICAL HISTORY:   Past Surgical History:   Procedure Laterality Date    BONE MARROW BIOPSY Left 4/25/2019    Procedure: Biopsy-bone marrow;  Surgeon: Catalina Rucker MD;  Location: [facility] OR [address] [place] [address];  Service: Orthopedics;  Laterality: Left;    right shoulder surgery      right rotator cuff       PAST SOCIAL HISTORY:   reports that he has never smoked. His smokeless tobacco use includes snuff. He reports that he drinks alcohol. He reports that he does not use drugs.    FAMILY HISTORY:  Family History   Problem Relation Age of Onset    Cancer Mother         Ovarian     Heart disease Neg Hx        CURRENT MEDICATIONS:   Current Outpatient Medications   Medication Sig    imatinib (GLEEVEC) 100 MG Tab Take 2 tablets (200 mg total) by mouth once daily with 1 other imatinib prescription.    imatinib (GLEEVEC) 400 MG Tab Take 1 tablet by mouth once daily at the same time with food and a large glass of water. Take with two 100mg tablets for a total dose of 600mg. Avoid grapefruit products.    multivitamin (THERAGRAN) per tablet Take 1 tablet by mouth once daily.     No current facility-administered medications for this visit.      ALLERGIES:   Review of patient's allergies indicates:  No Known Allergies      Review of Systems:     Review of Systems   Constitutional: Positive for fatigue. Negative for appetite change, chills, diaphoresis, fever and unexpected weight change.   HENT:   Negative for hearing loss, mouth sores, nosebleeds, sore throat, trouble swallowing and voice change.    Eyes: Negative for eye problems and icterus.   Respiratory: Negative for chest tightness, cough,  hemoptysis, shortness of breath and wheezing.    Cardiovascular: Negative for chest pain, leg swelling and palpitations.   Gastrointestinal: Negative for abdominal distention, abdominal pain, blood in stool, diarrhea, nausea and vomiting.   Endocrine: Negative for hot flashes.   Genitourinary: Negative for bladder incontinence, difficulty urinating, dysuria, frequency and hematuria.    Musculoskeletal: Negative for arthralgias, back pain, flank pain, gait problem, myalgias, neck pain and neck stiffness.   Skin: Negative for itching, rash and wound.   Neurological: Negative for dizziness, extremity weakness, gait problem, headaches, numbness, seizures and speech difficulty.   Hematological: Negative for adenopathy. Does not bruise/bleed easily.   Psychiatric/Behavioral: Negative for confusion, depression and sleep disturbance. The patient is not nervous/anxious.           Physical Exam:     Limited secondary to virtual visit    ECOG Performance Status: (foot note - ECOG PS provided by Eastern Cooperative Oncology Group) 1 - Symptomatic but completely ambulatory    Karnofsky Performance Score:  80%- Normal Activity with Effort: Some Symptoms of Disease    Labs:   Lab Results   Component Value Date    WBC 5.39 04/07/2020    HGB 14.2 04/07/2020    HCT 42.5 04/07/2020     04/07/2020    ALT 94 (H) 04/07/2020    AST 70 (H) 04/07/2020     04/07/2020    K 4.0 04/07/2020     04/07/2020    CREATININE 1.10 04/07/2020    BUN 16 04/07/2020    CO2 31 (H) 04/07/2020       Imaging:previous imaging has been reviewed     Assessment and Plan:     Mr. Couch is a 50 year old with CML    CML   --Began Imatinib 400mg daily on Sat May 11th, with a goal of of less that 10% BCR-ABL at 6 months  --Increased Imatinib to 600mg daily in November with a decrease in BCR-ABL from 60% to 28% to 19% to 10% and further to 4.3%  --Symptom management with calcium supplements and tonic water  --Will check labs q 3  months    Anemia  --Due to high CML burden  --Now normalized    Tobacco Cessation  --5 minutes were spent discussing the health benefits of stopping tobacco dip  --Counseling and guidance were provided    30 minutes were spent face to face with the patient to discuss the disease, natural history, treatment options and survival statistics. I have provided the patient with an opportunity to ask questions and have all questions answered to his satisfaction.       he will return to clinic in 3 months with labs 1 week earlier, but knows to call in the interim if symptoms change or should a problem arise.        Catalina Rucker MD  Hematology and Medical Oncology  Bone Marrow Transplant  [facility]

## 2020-04-17 ENCOUNTER — PATIENT MESSAGE (OUTPATIENT)
Dept: HEMATOLOGY/ONCOLOGY | Facility: CLINIC | Age: 50
End: 2020-04-17

## 2020-04-21 ENCOUNTER — TELEPHONE (OUTPATIENT)
Dept: PHARMACY | Facility: CLINIC | Age: 50
End: 2020-04-21

## 2020-04-21 NOTE — TELEPHONE ENCOUNTER
Patient returned call for Gleec refill. Patient reports having about 10 days of medication on hand. Confirmed dose/direcrtions: take 1-400 mg + 2-100 mg tablets QD for total dose of 600 mg. Denies missed doses. Will ship medication on 4/27 for the patient to receive on 4/28.  Address confirmed. Script is not processing - RTS rejection as it is two different strengths for the same drug. Discussed with patient it usually runs $300 copay and we get a one time CC to cover cost. Informed him anything changes we will reach out to him to let him know. Patient verbalized understanding. No changes in medications, allergies, or health conditions. Patient has no questions or concerns at this time. He is aware to contact [facility] if he needs anything.

## 2020-04-28 NOTE — TELEPHONE ENCOUNTER
Contacted patient in regards to Gleevec clinical follow up. He has been on the medication since 5/11/19.    Administration: Patient confirms taking medication as prescribed: 1-400mg tab + 2-100mg tabs once daily. He takes in the evening time with dinner. He does not wear gloves when taking the tablets but he washes his hands immediately after administering. Medication is stored at room temp.  Adherence: No issues with adherence.  Side effects/disease state management: He denied any notable s/es such as nausea, diarrhea or rash.  Pain levels: None noted today. He does have occasional discomfort on his side but it is self-limiting - no OTC analgesics needed.  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 He has noticed his energy levels are better when he is more active - encouraged him to continue doing activities he enjoys and being physically active can help boost energy. Additionally discussed the role of good nutrition and fluid intake in helping with energy - he will aim to increase fluid intake.  Recent s/sx infection: no  Recent UC/ED/hospitalizations: no  Current med list/DDIs: The patient only takes Gleevec, a multivitamin and Flonase prn allergies. He stated his allergies have worsened recently - discussed potentially trying an oral antihistamine such as Zyrtec or Allegra.    Overall, he seems to be tolerating Gleevec great. RPh will continue to f/u every three months and as clinically appropriate. Therapy appropriate. Encouraged patient to contact [facility] with any questions/concerns.

## 2020-05-21 DIAGNOSIS — C92.10 CML (CHRONIC MYELOCYTIC LEUKEMIA): ICD-10-CM

## 2020-05-21 RX ORDER — IMATINIB MESYLATE 400 MG/1
TABLET, FILM COATED ORAL
Qty: 30 TABLET | Refills: 3 | Status: SHIPPED | OUTPATIENT
Start: 2020-05-21 | End: 2020-09-21 | Stop reason: SDUPTHER

## 2020-05-22 ENCOUNTER — TELEPHONE (OUTPATIENT)
Dept: PHARMACY | Facility: CLINIC | Age: 50
End: 2020-05-22

## 2020-06-19 ENCOUNTER — TELEPHONE (OUTPATIENT)
Dept: PHARMACY | Facility: CLINIC | Age: 50
End: 2020-06-19

## 2020-06-19 NOTE — TELEPHONE ENCOUNTER
Patient returned refill call regarding Gleevec 100 and 400mg at [facility]. Will prepare for shipment with consent of patient on  to arrive . Copay 300.00.  Two one time use ccof ending in 100mg-7510, 400mg- 1975. Patient has not started any new medications including OTC drugs. Patient has not had any medication/ dose or instruction changes. No new allergies or side effects reported with this shipment. Medication is being taken as prescribed by physician and properly stored. Two patient identifiers:  and Address verified. Patient has no questions or concerns for [facility]. Patient has 10 days on hand.

## 2020-07-06 DIAGNOSIS — Z13.9 SCREENING FOR CONDITION: ICD-10-CM

## 2020-07-06 DIAGNOSIS — C92.10 CML (CHRONIC MYELOCYTIC LEUKEMIA): Primary | ICD-10-CM

## 2020-07-10 ENCOUNTER — TELEPHONE (OUTPATIENT)
Dept: HEMATOLOGY/ONCOLOGY | Facility: CLINIC | Age: 50
End: 2020-07-10

## 2020-07-13 ENCOUNTER — OFFICE VISIT (OUTPATIENT)
Dept: HEMATOLOGY/ONCOLOGY | Facility: CLINIC | Age: 50
End: 2020-07-13
Payer: COMMERCIAL

## 2020-07-13 VITALS
WEIGHT: 207.69 LBS | BODY MASS INDEX: 30.76 KG/M2 | DIASTOLIC BLOOD PRESSURE: 84 MMHG | SYSTOLIC BLOOD PRESSURE: 142 MMHG | RESPIRATION RATE: 16 BRPM | OXYGEN SATURATION: 99 % | HEIGHT: 69 IN | HEART RATE: 63 BPM

## 2020-07-13 DIAGNOSIS — D72.829 LEUKOCYTOSIS, UNSPECIFIED TYPE: ICD-10-CM

## 2020-07-13 DIAGNOSIS — R16.1 SPLENOMEGALY: ICD-10-CM

## 2020-07-13 DIAGNOSIS — D63.0 ANEMIA IN NEOPLASTIC DISEASE: ICD-10-CM

## 2020-07-13 DIAGNOSIS — C92.10 CML (CHRONIC MYELOCYTIC LEUKEMIA): Primary | ICD-10-CM

## 2020-07-13 PROCEDURE — 99215 OFFICE O/P EST HI 40 MIN: CPT | Mod: S$GLB,,, | Performed by: INTERNAL MEDICINE

## 2020-07-13 PROCEDURE — 99999 PR PBB SHADOW E&M-EST. PATIENT-LVL III: CPT | Mod: PBBFAC,,, | Performed by: INTERNAL MEDICINE

## 2020-07-13 PROCEDURE — 99999 PR PBB SHADOW E&M-EST. PATIENT-LVL III: ICD-10-PCS | Mod: PBBFAC,,, | Performed by: INTERNAL MEDICINE

## 2020-07-13 PROCEDURE — 3008F PR BODY MASS INDEX (BMI) DOCUMENTED: ICD-10-PCS | Mod: CPTII,S$GLB,, | Performed by: INTERNAL MEDICINE

## 2020-07-13 PROCEDURE — 3008F BODY MASS INDEX DOCD: CPT | Mod: CPTII,S$GLB,, | Performed by: INTERNAL MEDICINE

## 2020-07-13 PROCEDURE — 99215 PR OFFICE/OUTPT VISIT, EST, LEVL V, 40-54 MIN: ICD-10-PCS | Mod: S$GLB,,, | Performed by: INTERNAL MEDICINE

## 2020-07-13 NOTE — Clinical Note
1. Labs in 3 months at [facility]: cbc,cmp, bcrabl p210   2. See me 1 week after labs

## 2020-07-20 ENCOUNTER — TELEPHONE (OUTPATIENT)
Dept: PHARMACY | Facility: CLINIC | Age: 50
End: 2020-07-20

## 2020-07-20 RX ORDER — FLUTICASONE PROPIONATE 50 MCG
2 SPRAY, SUSPENSION (ML) NASAL DAILY
COMMUNITY

## 2020-07-20 NOTE — TELEPHONE ENCOUNTER
"Gleevec follow-up:  Patient reached for follow-up of ongoing Gleevec therapy.  Mr. Couch is doing well with treatment and exhibiting a great response.      Dosing, how taking Gleevec: Take 1 - 400mg tablet with 2 - 100mg tablets (total 600mg).  Takes in the evening with with dinner.  Currently has about 11 doses on hand.    Storage: Room temperature.  Handling: Washing hands after each dose.  Side effects: Was suffering from "night sweats" during the day when he tried to switch taking medication to AM, so he went back to PM dosing.  Denies any swelling in feet or face, no N/V/D, no rash, etc.    Recent infections: No fevers, flu-like symptoms, persistent cough, etc.  No nosebleeds, bleeding gums, etc.  Pain: No pain - 0/10.  Appetite: "Too good" - eats plenty - stays well hydrated.  Drinks water when he's outdoors.    Energy, fatigue: Stable - no changes.  Health, mood, QOL:  "Close to a 0/10".  Feels good.  Was having some muscle cramps but started drinking pickle juice (MD aware) which patient states has helped tremendously.  ED/UC visits: None  Next clinical follow up: 3 months  Medication list reviewed. No new allergies or health conditions. Continues to take only a multivitamin, Gleevec and Flonase.    Labs reviewed from: 2020 - WBC, ANC and platelets all normal.  Renal and hepatic function normal.   Decrease in BCR-ABL from 10% to 2.6%.  Patient tolerates well.  Therapy remains appropriate.    Gleevec refill confirmed for shipment on  for delivery on .  No new medications, allergies or health conditions to report.  No urgent care/ER visits.  No questions or concerns.  Copay $0.00 for patient ( supplies one time use [facility]).  Address confirmed.  Two patient identifiers confirmed - name and .  Therapy appropriate.              "

## 2020-08-21 ENCOUNTER — TELEPHONE (OUTPATIENT)
Dept: PHARMACY | Facility: CLINIC | Age: 50
End: 2020-08-21

## 2020-08-24 ENCOUNTER — TELEPHONE (OUTPATIENT)
Dept: PHARMACY | Facility: CLINIC | Age: 50
End: 2020-08-24
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
## 2020-09-21 DIAGNOSIS — C92.10 CML (CHRONIC MYELOCYTIC LEUKEMIA): ICD-10-CM

## 2020-09-21 RX ORDER — IMATINIB MESYLATE 400 MG/1
TABLET, FILM COATED ORAL
Qty: 30 TABLET | Refills: 3 | Status: SHIPPED | OUTPATIENT
Start: 2020-09-21 | End: 2020-12-23 | Stop reason: SDUPTHER

## 2020-09-22 ENCOUNTER — TELEPHONE (OUTPATIENT)
Dept: PHARMACY | Facility: CLINIC | Age: 50
End: 2020-09-22

## 2020-09-23 ENCOUNTER — PATIENT MESSAGE (OUTPATIENT)
Dept: PRIMARY CARE CLINIC | Facility: CLINIC | Age: 50
End: 2020-09-23

## 2020-09-25 ENCOUNTER — PATIENT OUTREACH (OUTPATIENT)
Dept: ADMINISTRATIVE | Facility: HOSPITAL | Age: 50
End: 2020-09-25

## 2020-10-06 ENCOUNTER — TELEPHONE (OUTPATIENT)
Dept: HEMATOLOGY/ONCOLOGY | Facility: CLINIC | Age: 50
End: 2020-10-06

## 2020-10-08 ENCOUNTER — PATIENT MESSAGE (OUTPATIENT)
Dept: PRIMARY CARE CLINIC | Facility: CLINIC | Age: 50
End: 2020-10-08

## 2020-10-13 ENCOUNTER — TELEPHONE (OUTPATIENT)
Dept: PHARMACY | Facility: CLINIC | Age: 50
End: 2020-10-13

## 2020-10-20 ENCOUNTER — SPECIALTY PHARMACY (OUTPATIENT)
Dept: PHARMACY | Facility: CLINIC | Age: 50
End: 2020-10-20

## 2020-10-20 NOTE — TELEPHONE ENCOUNTER
Specialty Pharmacy - Refill Coordination    Specialty Medication Orders Linked to Encounter      Most Recent Value   Medication #1  imatinib (GLEEVEC) 100 MG Tab (Order#[id number], Rx#[id number])   Medication #2  imatinib (GLEEVEC) 400 MG Tab (Order#[id number], Rx#[id number])          Refill Questions - Documented Responses      Most Recent Value   Relationship to patient of person spoken to?  Self   HIPAA/medical authority confirmed?  Yes   Any changes in contact preferences or allowed representatives?  No   Has the patient had any insurance changes?  No   Has the patient had any changes to specialty medication, dose, or instructions?  No   Has the patient started taking any new medications, herbals, or supplements?  No   Has the patient been diagnosed with any new medical conditions?  No   Does the patient have any new allergies to medications or foods?  No   Does the patient have any concerns about side effects?  No   Can the patient store medication/sharps container properly (at the correct temperature, away from children/pets, etc.)?  Yes   Can the patient call emergency services (911) in the event of an emergency?  Yes   Does the patient have any concerns or questions about taking or administering this medication as prescribed?  No   How many doses did the patient miss in the past 4 weeks or since the last fill?  0   How many doses does the patient have on hand?  7   How many days does the patient report on hand quantity will last?  7   Does the number of doses/days supply remaining match pharmacy expected amounts?  Yes   How will the patient receive the medication?  Mail   When does the patient need to receive the medication?  10/27/20   Shipping Address  Home   Address in [facility] confirmed and updated if neccessary?  Yes   Expected Copay ($)  0   Is the patient able to afford the medication copay?  Yes   Payment Method  zero copay   Days supply of Refill  30   Would patient like to speak to a  pharmacist?  No   Do you want to trigger an intervention?  No   Do you want to trigger an additional referral task?  No   Refill activity completed?  Yes   Refill activity plan  Refill scheduled   Shipment/Pickup Date:  10/21/20          Current Outpatient Medications   Medication Sig    fluticasone propionate (FLONASE) 50 mcg/actuation nasal spray 2 sprays by Each Nostril route once daily.    imatinib (GLEEVEC) 100 MG Tab Take 2 tablets (200 mg total) by mouth once daily with 1 other imatinib prescription.    imatinib (GLEEVEC) 400 MG Tab Take 1 tablet by mouth once daily at the same time with food and a large glass of water. Take with two 100mg tablets for a total dose of 600mg. Avoid grapefruit products.    multivitamin (THERAGRAN) per tablet Take 1 tablet by mouth once daily.   Last reviewed on 7/20/2020  3:57 PM by Juana Rudd PharmD    Review of patient's allergies indicates:  No Known Allergies Last reviewed on  7/13/2020 1:01 PM by Avis Fang      Tasks added this encounter   No tasks added.   Tasks due within next 3 months   10/20/2020 - Clinical - Follow Up Assesement (90 day)  10/20/2020 - Refill Call     Howie Melo  [facility]  [address] [place] [id number]  Phone: [phone]  Fax: [phone]

## 2020-10-21 ENCOUNTER — OFFICE VISIT (OUTPATIENT)
Dept: HEMATOLOGY/ONCOLOGY | Facility: CLINIC | Age: 50
End: 2020-10-21
Payer: COMMERCIAL

## 2020-10-21 ENCOUNTER — SPECIALTY PHARMACY (OUTPATIENT)
Dept: PHARMACY | Facility: CLINIC | Age: 50
End: 2020-10-21

## 2020-10-21 VITALS
DIASTOLIC BLOOD PRESSURE: 68 MMHG | HEIGHT: 69 IN | OXYGEN SATURATION: 100 % | TEMPERATURE: 99 F | RESPIRATION RATE: 18 BRPM | HEART RATE: 70 BPM | WEIGHT: 202.81 LBS | SYSTOLIC BLOOD PRESSURE: 157 MMHG | BODY MASS INDEX: 30.04 KG/M2

## 2020-10-21 DIAGNOSIS — J30.2 SEASONAL ALLERGIC RHINITIS, UNSPECIFIED TRIGGER: ICD-10-CM

## 2020-10-21 DIAGNOSIS — C92.10 CML (CHRONIC MYELOCYTIC LEUKEMIA): Primary | ICD-10-CM

## 2020-10-21 PROCEDURE — 99999 PR PBB SHADOW E&M-EST. PATIENT-LVL III: CPT | Mod: PBBFAC,,, | Performed by: NURSE PRACTITIONER

## 2020-10-21 PROCEDURE — 3008F PR BODY MASS INDEX (BMI) DOCUMENTED: ICD-10-PCS | Mod: CPTII,S$GLB,, | Performed by: NURSE PRACTITIONER

## 2020-10-21 PROCEDURE — 99214 PR OFFICE/OUTPT VISIT, EST, LEVL IV, 30-39 MIN: ICD-10-PCS | Mod: S$GLB,,, | Performed by: NURSE PRACTITIONER

## 2020-10-21 PROCEDURE — 99999 PR PBB SHADOW E&M-EST. PATIENT-LVL III: ICD-10-PCS | Mod: PBBFAC,,, | Performed by: NURSE PRACTITIONER

## 2020-10-21 PROCEDURE — 3008F BODY MASS INDEX DOCD: CPT | Mod: CPTII,S$GLB,, | Performed by: NURSE PRACTITIONER

## 2020-10-21 PROCEDURE — 99214 OFFICE O/P EST MOD 30 MIN: CPT | Mod: S$GLB,,, | Performed by: NURSE PRACTITIONER

## 2020-10-21 NOTE — PROGRESS NOTES
Hematology and Bone Marrow Transplant   Follow Up      Primary Hematologic Diagnosis: CML    History of Present Ilness:   Romeo Couch Jr. (Romeo) is a pleasant 50 y.o.male who presents for follow-up of CML.    Hematology History:  --Presented to [facility] [place] on 4[place]23/19. He reported that he went to his PCP with left abdominal pain and temp of 100.4 the day before and was sent by PCP for CT. CT showed splenomegaly. Pt was instructed to go to ED. Went to [place] ED and was found to have leukocytosis. Wanted to transfer patient to [facility], but no beds were available. Patient reportedly left [place] ED AMA and came to [facility] [place] over night. WBC 238K. Suspicious for acute leukemia. He denies any aches, chills, n/v/d, constipation, chest pain, bleeding or bruising. C/o SOB on exertion and mild, non-productive cough. Patient states that he has lost 8 lbs in the last few weeks.  --Peripheral smear was reviewed in the ED which was highly suggestive of CML  --Bone marrow biopsy on 4/25/19: CHRONIC MYELOID LEUKEMIA, BCR-ABL1-POSITIVE, CHRONIC PHASE. FOCAL GRADE 2 RETICULAR FIBROSIS  --BCR-ABL P210 80% at diagnosis  --Began imatinib on Saturday May 11, 2019, increased to 600 mg November 2019    Interval History:  Patient presents today for 3 month follow-up. He reports tolerating imatinib without difficulty. He denies rashes, nausea, vomiting, diarrhea or swelling. His main complaint today is sinus congestion. 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     PAST MEDICAL HISTORY:   Past Medical History:   Diagnosis Date    Leukocytosis     Pneumothorax     27 yo       PAST SURGICAL HISTORY:   Past Surgical History:   Procedure Laterality Date    BONE MARROW BIOPSY Left 4/25/2019    Procedure: Biopsy-bone marrow;  Surgeon: Catalina Rucker MD;  Location: [facility] OR [address] [place] [address];  Service: Orthopedics;  Laterality: Left;    right shoulder surgery      right rotator cuff       PAST SOCIAL HISTORY:   reports that he has  never smoked. His smokeless tobacco use includes snuff. He reports current alcohol use. He reports that he does not use drugs.    FAMILY HISTORY:  Family History   Problem Relation Age of Onset    Cancer Mother         Ovarian     Heart disease Neg Hx        CURRENT MEDICATIONS:   Current Outpatient Medications   Medication Sig    fluticasone propionate (FLONASE) 50 mcg/actuation nasal spray 2 sprays by Each Nostril route once daily.    imatinib (GLEEVEC) 100 MG Tab Take 2 tablets (200 mg total) by mouth once daily with 1 other imatinib prescription.    imatinib (GLEEVEC) 400 MG Tab Take 1 tablet by mouth once daily at the same time with food and a large glass of water. Take with two 100mg tablets for a total dose of 600mg. Avoid grapefruit products.    multivitamin (THERAGRAN) per tablet Take 1 tablet by mouth once daily.     No current facility-administered medications for this visit.      ALLERGIES:   Review of patient's allergies indicates:  No Known Allergies      Review of Systems:     Review of Systems   Constitutional: Negative for appetite change, chills, diaphoresis, fatigue, fever and unexpected weight change.   HENT:   Negative for hearing loss, mouth sores, nosebleeds, sore throat, trouble swallowing and voice change.         Positive for sinus congestion   Eyes: Negative for eye problems and icterus.   Respiratory: Negative for chest tightness, cough, hemoptysis, shortness of breath and wheezing.    Cardiovascular: Negative for chest pain, leg swelling and palpitations.   Gastrointestinal: Negative for abdominal distention, abdominal pain, blood in stool, diarrhea, nausea and vomiting.   Endocrine: Negative for hot flashes.   Genitourinary: Negative for bladder incontinence, difficulty urinating, dysuria, frequency and hematuria.    Musculoskeletal: Negative for arthralgias, back pain, flank pain, gait problem, myalgias, neck pain and neck stiffness.   Skin: Negative for itching, rash and wound.    Neurological: Negative for dizziness, extremity weakness, gait problem, headaches, numbness, seizures and speech difficulty.   Hematological: Negative for adenopathy. Does not bruise/bleed easily.   Psychiatric/Behavioral: Negative for confusion, depression and sleep disturbance. The patient is not nervous/anxious.           Physical Exam:       ECOG Performance Status: (foot note - ECOG PS provided by Eastern Cooperative Oncology Group) 0 - Asymptomatic    Karnofsky Performance Score:  90%- Able to Carry on Normal Activity: Minor Symptoms of Disease    Labs:   Lab Results   Component Value Date    WBC 5.52 10/14/2020    HGB 14.1 10/14/2020    HCT 43.5 10/14/2020     10/14/2020    ALT 46 (H) 10/14/2020    AST 57 (H) 10/14/2020     10/14/2020    K 4.1 10/14/2020     10/14/2020    CREATININE 1.13 10/14/2020    BUN 16 10/14/2020    CO2 29 10/14/2020       Imaging:previous imaging has been reviewed     Assessment and Plan:     Mr. Couch is a 50 y.o. with CML    CML   --Began Imatinib 400mg daily on Sat May 11th 2019, with a goal of of less that 10% BCR-ABL at 6 months  --Increased Imatinib to 600mg daily in November 2019 with a decrease in BCR-ABL from 60% to 28% to 19% to 10% to 5% to 4.2% to 2.6% now 1.6%  --CBC wnl today  --continue q 3 month surveillance    Allergic rhinitis  - continue Flonase  - will start Claritin     Disposition: RTC in 3 months with labs 1 week earlier    Justina Corcoran NP  Hematology/Oncology

## 2020-10-21 NOTE — Clinical Note
Cbc, cmo, BCR/ABL p210 in 3 months at [facility] lab with appt with Dr. Rucker 1 week later

## 2020-10-21 NOTE — TELEPHONE ENCOUNTER
Contacted patient for his Gleevec follow up. He is currently on his way to his MD office visit and is unable to complete at this time. He requested a call back at a later time.

## 2020-11-19 ENCOUNTER — SPECIALTY PHARMACY (OUTPATIENT)
Dept: PHARMACY | Facility: CLINIC | Age: 50
End: 2020-11-19

## 2020-11-19 DIAGNOSIS — C92.10 CML (CHRONIC MYELOCYTIC LEUKEMIA): Primary | ICD-10-CM

## 2020-11-19 NOTE — TELEPHONE ENCOUNTER
Specialty Pharmacy - Clinical Reassessment  Specialty Pharmacy - Refill Coordination    Specialty Medication Orders Linked to Encounter      Most Recent Value   Medication #1  imatinib (GLEEVEC) 100 MG Tab (Order#[id number], Rx#[id number])   Medication #2  imatinib (GLEEVEC) 400 MG Tab (Order#[id number], Rx#[id number])        Chelsie Couch Jr. is a 50 y.o. male, who is followed by the specialty pharmacy service for management and education.    Recent Encounters     Date Type Provider Description    11/19/2020 Specialty Pharmacy Ratna Jarrett PharmD Follow-up Clinical Reassessment; Refill Coordination    10/21/2020 Specialty Pharmacy Ratna Jarrett PharmD Follow-up Clinical Reassessment    10/20/2020 Specialty Pharmacy Howie Melo Refill Coordination        Clinical call attempts since last clinical assessment   10/21/2020  6:02 PM - Specialty Pharmacy - Clinical Reassessment by Ratna Jarrett PharmD  10/29/2020 10:44 PM - Specialty Pharmacy - Clinical Reassessment by Ratna Jarrett PharmD  11/10/2020  7:52 PM - Specialty Pharmacy - Clinical Reassessment by Ratna Jarrett PharmD  11/17/2020  4:35 PM - Specialty Pharmacy - Clinical Reassessment by Ratna Jarrett PharmD     Today he received follow up education for his specialty medication(s).    Current Outpatient Medications   Medication Sig Note    fluticasone propionate (FLONASE) 50 mcg/actuation nasal spray 2 sprays by Each Nostril route once daily. 11/19/2020: PRN    imatinib (GLEEVEC) 100 MG Tab Take 2 tablets (200 mg total) by mouth once daily with 1 other imatinib prescription.     imatinib (GLEEVEC) 400 MG Tab Take 1 tablet by mouth once daily at the same time with food and a large glass of water. Take with two 100mg tablets for a total dose of 600mg. Avoid grapefruit products.     multivitamin (THERAGRAN) per tablet Take 1 tablet by mouth once daily.    Last reviewed on 11/19/2020  5:28 PM by Ratna Jarrett  PharmD    Review of patient's allergies indicates:  No Known AllergiesLast reviewed on  11/19/2020 5:28 PM by Ratna Jarrett    Drug Interactions    Drug interactions evaluated: yes  Clinically relevant drug interactions identified: no  Provided the patient with educational material regarding drug interactions: not applicable       Medication Adherence    What concerns does the patient have in regards to their medications: None  Patient reported X missed doses in the last month: 0  Any gaps in refill history greater than 2 weeks in the last 3 months: no  Demonstrates understanding of importance of adherence: yes  Informant: patient  Reliability of informant: reliable  Provider-estimated medication adherence level: good  Reasons for non-adherence: no problems identified   Other adherence tool: Daily routines   Support network for adherence: family member  Confirmed plan for next specialty medication refill: delivery by pharmacy  Refills needed for supportive medications: not needed       Adverse Effects    *All other systems reviewed and are negative       Assessment Questions - Documented Responses      Most Recent Value   Assessment   During the past 4 weeks, has patient missed any activities due to condition or medication?  No   During the past 4 weeks, did patient have any of the following urgent care visits?  None   Goals of Therapy Status  Achieving   Welcome packet contents reviewed and discussed with patient?  -- [N/A - this is a f/u]   Assesment completed?  Yes   Plan  Therapy continued   Do you need to open a clinical intervention (i-vent)?  No   Do you want to schedule first shipment?  -- [N/A - this is a f/u]        Refill Questions - Documented Responses      Most Recent Value   Relationship to patient of person spoken to?  Self   HIPAA/medical authority confirmed?  Yes   Any changes in contact preferences or allowed representatives?  No   Has the patient had any insurance changes?  No   Has the patient  had any changes to specialty medication, dose, or instructions?  No   Has the patient started taking any new medications, herbals, or supplements?  No   Has the patient been diagnosed with any new medical conditions?  No   Does the patient have any new allergies to medications or foods?  No   Does the patient have any concerns about side effects?  No   Can the patient store medication/sharps container properly (at the correct temperature, away from children/pets, etc.)?  Yes   Can the patient call emergency services (911) in the event of an emergency?  Yes   Does the patient have any concerns or questions about taking or administering this medication as prescribed?  No   How many doses did the patient miss in the past 4 weeks or since the last fill?  0   How many doses does the patient have on hand?  7   How many days does the patient report on hand quantity will last?  7   Does the number of doses/days supply remaining match pharmacy expected amounts?  Yes   Does the patient feel that this medication is effective?  Yes   During the past 4 weeks, has patient missed any activities due to condition or medication?  No   During the past 4 weeks, did patient have any of the following urgent care visits?  None   How will the patient receive the medication?  Mail   When does the patient need to receive the medication?  11/26/20   Shipping Address  Home   Address in [facility] confirmed and updated if neccessary?  Yes   Expected Copay ($)  0   Is the patient able to afford the medication copay?  Yes   Payment Method  zero copay   Days supply of Refill  30   Would patient like to speak to a pharmacist?  Yes   Do you want to trigger an intervention?  No   Do you want to trigger an additional referral task?  No   Refill activity completed?  Yes   Refill activity plan  Refill scheduled   Shipment/Pickup Date:  11/23/20          Objective    He has a past medical history of Leukocytosis and Pneumothorax.    Tried/failed  "medications: None    BP Readings from Last 4 Encounters:   10/21/20 (!) 157/68   07/13/20 (!) 142/84   01/14/20 (!) 154/78   12/09/19 (!) 149/77     Ht Readings from Last 4 Encounters:   10/21/20 5' 9" (1.753 m)   07/13/20 5' 9" (1.753 m)   01/14/20 5' 9" (1.753 m)   12/09/19 5' 9" (1.753 m)     Wt Readings from Last 4 Encounters:   10/21/20 92 kg (202 lb 12.8 oz)   07/13/20 94.2 kg (207 lb 10.8 oz)   01/14/20 94.9 kg (209 lb 3.5 oz)   12/09/19 94.3 kg (207 lb 14.3 oz)     Recent Labs   Lab Result Units 10/14/20  0745   RBC M/uL 4.28 L   Hemoglobin g/dL 14.1   Hematocrit % 43.5   WBC K/uL 5.52   Gran # (ANC) K/uL 3.3   Gran % % 58.8   Platelets K/uL 249   Sodium mmol/L 144   Potassium mmol/L 4.1   Chloride mmol/L 108   Glucose mg/dL 101   BUN mg/dL 16   Creatinine mg/dL 1.13   Calcium mg/dL 9.0   Total Protein g/dL 6.8   Albumin g/dL 4.1   Total Bilirubin mg/dL 0.9   Alkaline Phosphatase U/L 54   AST U/L 57 H   ALT U/L 46 H     The goals of cancer treatment include:  · Achieving remission of cancer, if possible  · Reducing tumor size and spread of cancer, if remission is not possible  · Minimizing pain and symptoms of the cancer  · Preventing infection and other complications of treatment  · Promoting adequate nutrition  · Encouraging proper hydration  · Improving or maintaining quality of life  · Maintaining optimal therapy adherence  · Minimizing and managing side effects    Goals of Therapy Status: Achieving    Assessment/Plan  Patient plans to continue therapy without changes    Indication, dosage, appropriateness, effectiveness, safety and convenience of his specialty medication(s) were reviewed today.     Patient Counseling    Counseled the patient on the following: doses and administration discussed, safe handling, storage, and disposal discussed, possible adverse effects and management discussed, possible drug and prescription drug interactions discussed, possible drug and OTC drug and food interactions " discussed, lab monitoring and follow-up discussed, therapeutic rationale discussed, cost of medications and cost implications discussed, adherence and missed doses discussed, pharmacy contact information discussed       Patient takes Gleevec 2-100 mg tablets along with 1-400 mg tablet by mouth once daily with dinner. Denies missed doses. Avoids grapefruit/grapefruit juice. Patient stores medication in medicine cabinet in kitchen at room temperature. Patient touches medication directly, but washes his hands afterwards.     Patient declined a review of side effects and warnings/precautions. He denies any issues with side effects or signs of infection - no fever, achy chills, wet persistent cough. Patient reports sinus allergies that is well managed with OTC Flonase.     Patient denies pain (0/10). He reports having good energy and notes that he is able to complete his daily activities without limitations. Patient eats a small snack for breakfast and 2 meals/day.    Patient denies anxiety, stress, and depression. He reports good support from family and friends.     Labs reviewed from 10/14/2020. CBC and CMP - stable. Renal and hepatic function are stable. No action needed. 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 Therapy is effective in treating patient's disease as his BCR-ABL levels continue to decrease.    Patient has no questions or concerns at this time. He is aware to contact [facility] if he needs anything.    Tasks added this encounter   2/10/2021 - Clinical - Follow Up Assesement (90 day)  12/17/2020 - Refill Call (Auto Added)   Tasks due within next 3 months   No tasks due.     Ratna Jarrett, Laurie  [facility] - [facility] Pharm[facility]  [address] [place] [id number]  Phone: [phone]  Fax: [phone]

## 2020-12-18 ENCOUNTER — SPECIALTY PHARMACY (OUTPATIENT)
Dept: PHARMACY | Facility: CLINIC | Age: 50
End: 2020-12-18

## 2020-12-18 NOTE — TELEPHONE ENCOUNTER
Specialty Pharmacy - Refill Coordination    Specialty Medication Orders Linked to Encounter      Most Recent Value   Medication #1  imatinib (GLEEVEC) 100 MG Tab (Order#[id number], Rx#[id number])   Medication #2  imatinib (GLEEVEC) 400 MG Tab (Order#[id number], Rx#[id number])          Refill Questions - Documented Responses      Most Recent Value   Relationship to patient of person spoken to?  Self   HIPAA/medical authority confirmed?  Yes   Any changes in contact preferences or allowed representatives?  No   Has the patient had any insurance changes?  No   Has the patient had any changes to specialty medication, dose, or instructions?  No   Has the patient started taking any new medications, herbals, or supplements?  No   Has the patient been diagnosed with any new medical conditions?  No   Does the patient have any new allergies to medications or foods?  No   Does the patient have any concerns about side effects?  No   Can the patient store medication/sharps container properly (at the correct temperature, away from children/pets, etc.)?  Yes   Can the patient call emergency services (911) in the event of an emergency?  Yes   Does the patient have any concerns or questions about taking or administering this medication as prescribed?  No   How many doses did the patient miss in the past 4 weeks or since the last fill?  0   How many doses does the patient have on hand?  7   How many days does the patient report on hand quantity will last?  7   Does the number of doses/days supply remaining match pharmacy expected amounts?  Yes   Does the patient feel that this medication is effective?  Yes   During the past 4 weeks, has patient missed any activities due to condition or medication?  No   During the past 4 weeks, did patient have any of the following urgent care visits?  None   How will the patient receive the medication?  Mail   When does the patient need to receive the medication?  12/24/20   Shipping Address  Home    Address in [facility] confirmed and updated if neccessary?  Yes   Expected Copay ($)  0   Is the patient able to afford the medication copay?  Yes   Payment Method  zero copay   Days supply of Refill  28   Would patient like to speak to a pharmacist?  No   Do you want to trigger an intervention?  No   Do you want to trigger an additional referral task?  No   Refill activity completed?  Yes   Refill activity plan  Refill scheduled   Shipment/Pickup Date:  12/22/20          Current Outpatient Medications   Medication Sig    fluticasone propionate (FLONASE) 50 mcg/actuation nasal spray 2 sprays by Each Nostril route once daily.    imatinib (GLEEVEC) 100 MG Tab Take 2 tablets (200 mg total) by mouth once daily with 1 other imatinib prescription.    imatinib (GLEEVEC) 400 MG Tab Take 1 tablet by mouth once daily at the same time with food and a large glass of water. Take with two 100mg tablets for a total dose of 600mg. Avoid grapefruit products.    multivitamin (THERAGRAN) per tablet Take 1 tablet by mouth once daily.   Last reviewed on 11/19/2020  5:28 PM by Ratna Jarrett PharmD    Review of patient's allergies indicates:  No Known Allergies Last reviewed on  11/19/2020 5:28 PM by Ratna Jarrett      Tasks added this encounter   No tasks added.   Tasks due within next 3 months   2/10/2021 - Clinical - Follow Up Assesement (90 day)  12/17/2020 - Refill Call (Auto Added)     Howie Melo  [facility]  [address] [place] [address]  Phone: [phone]  Fax: [phone]

## 2020-12-23 DIAGNOSIS — C92.10 CML (CHRONIC MYELOCYTIC LEUKEMIA): ICD-10-CM

## 2020-12-23 RX ORDER — IMATINIB MESYLATE 400 MG/1
TABLET, FILM COATED ORAL
Qty: 30 TABLET | Refills: 3 | Status: SHIPPED | OUTPATIENT
Start: 2020-12-23 | End: 2021-04-28 | Stop reason: SDUPTHER

## 2021-01-04 ENCOUNTER — PATIENT MESSAGE (OUTPATIENT)
Dept: ADMINISTRATIVE | Facility: HOSPITAL | Age: 51
End: 2021-01-04

## 2021-01-07 ENCOUNTER — PATIENT MESSAGE (OUTPATIENT)
Dept: HEMATOLOGY/ONCOLOGY | Facility: CLINIC | Age: 51
End: 2021-01-07

## 2021-01-07 DIAGNOSIS — C92.10 CML (CHRONIC MYELOCYTIC LEUKEMIA): Primary | ICD-10-CM

## 2021-01-13 ENCOUNTER — SPECIALTY PHARMACY (OUTPATIENT)
Dept: PHARMACY | Facility: CLINIC | Age: 51
End: 2021-01-13

## 2021-01-13 DIAGNOSIS — C92.10 CML (CHRONIC MYELOCYTIC LEUKEMIA): Primary | ICD-10-CM

## 2021-01-19 ENCOUNTER — SPECIALTY PHARMACY (OUTPATIENT)
Dept: PHARMACY | Facility: CLINIC | Age: 51
End: 2021-01-19

## 2021-01-19 DIAGNOSIS — C92.10 CML (CHRONIC MYELOCYTIC LEUKEMIA): Primary | ICD-10-CM

## 2021-01-20 ENCOUNTER — DOCUMENTATION ONLY (OUTPATIENT)
Dept: HEMATOLOGY/ONCOLOGY | Facility: CLINIC | Age: 51
End: 2021-01-20

## 2021-01-25 ENCOUNTER — OFFICE VISIT (OUTPATIENT)
Dept: HEMATOLOGY/ONCOLOGY | Facility: CLINIC | Age: 51
End: 2021-01-25
Payer: COMMERCIAL

## 2021-01-25 DIAGNOSIS — C92.10 CML (CHRONIC MYELOCYTIC LEUKEMIA): Primary | ICD-10-CM

## 2021-01-25 DIAGNOSIS — R03.0 ELEVATED BLOOD PRESSURE READING: ICD-10-CM

## 2021-01-25 DIAGNOSIS — D72.829 LEUKOCYTOSIS, UNSPECIFIED TYPE: ICD-10-CM

## 2021-01-25 DIAGNOSIS — R03.0 ELEVATED BLOOD-PRESSURE READING WITHOUT DIAGNOSIS OF HYPERTENSION: ICD-10-CM

## 2021-01-25 PROCEDURE — 99214 PR OFFICE/OUTPT VISIT, EST, LEVL IV, 30-39 MIN: ICD-10-PCS | Mod: 95,,, | Performed by: INTERNAL MEDICINE

## 2021-01-25 PROCEDURE — 99214 OFFICE O/P EST MOD 30 MIN: CPT | Mod: 95,,, | Performed by: INTERNAL MEDICINE

## 2021-02-11 ENCOUNTER — SPECIALTY PHARMACY (OUTPATIENT)
Dept: PHARMACY | Facility: CLINIC | Age: 51
End: 2021-02-11

## 2021-02-11 DIAGNOSIS — C92.10 CML (CHRONIC MYELOCYTIC LEUKEMIA): Primary | ICD-10-CM

## 2021-02-17 DIAGNOSIS — C92.10 CML (CHRONIC MYELOCYTIC LEUKEMIA): ICD-10-CM

## 2021-02-18 RX ORDER — IMATINIB MESYLATE 100 MG/1
200 TABLET, FILM COATED ORAL DAILY
Qty: 180 TABLET | Refills: 11 | Status: SHIPPED | OUTPATIENT
Start: 2021-02-18 | End: 2021-06-07 | Stop reason: SDUPTHER

## 2021-02-22 ENCOUNTER — SPECIALTY PHARMACY (OUTPATIENT)
Dept: PHARMACY | Facility: CLINIC | Age: 51
End: 2021-02-22

## 2021-02-23 ENCOUNTER — DOCUMENTATION ONLY (OUTPATIENT)
Dept: HEMATOLOGY/ONCOLOGY | Facility: CLINIC | Age: 51
End: 2021-02-23

## 2021-02-26 ENCOUNTER — OFFICE VISIT (OUTPATIENT)
Dept: FAMILY MEDICINE | Facility: CLINIC | Age: 51
End: 2021-02-26
Payer: COMMERCIAL

## 2021-02-26 VITALS
RESPIRATION RATE: 18 BRPM | HEART RATE: 66 BPM | WEIGHT: 205 LBS | SYSTOLIC BLOOD PRESSURE: 132 MMHG | DIASTOLIC BLOOD PRESSURE: 78 MMHG | HEIGHT: 69 IN | TEMPERATURE: 98 F | OXYGEN SATURATION: 98 % | BODY MASS INDEX: 30.36 KG/M2

## 2021-02-26 DIAGNOSIS — Z00.00 GENERAL MEDICAL EXAM: Primary | ICD-10-CM

## 2021-02-26 DIAGNOSIS — R05.9 COUGH: ICD-10-CM

## 2021-02-26 DIAGNOSIS — Z12.5 PROSTATE CANCER SCREENING: ICD-10-CM

## 2021-02-26 PROCEDURE — 99999 PR PBB SHADOW E&M-EST. PATIENT-LVL IV: CPT | Mod: PBBFAC,,, | Performed by: FAMILY MEDICINE

## 2021-02-26 PROCEDURE — 1126F AMNT PAIN NOTED NONE PRSNT: CPT | Mod: S$GLB,,, | Performed by: FAMILY MEDICINE

## 2021-02-26 PROCEDURE — 3008F BODY MASS INDEX DOCD: CPT | Mod: CPTII,S$GLB,, | Performed by: FAMILY MEDICINE

## 2021-02-26 PROCEDURE — 99396 PREV VISIT EST AGE 40-64: CPT | Mod: S$GLB,,, | Performed by: FAMILY MEDICINE

## 2021-02-26 PROCEDURE — 1126F PR PAIN SEVERITY QUANTIFIED, NO PAIN PRESENT: ICD-10-PCS | Mod: S$GLB,,, | Performed by: FAMILY MEDICINE

## 2021-02-26 PROCEDURE — U0003 INFECTIOUS AGENT DETECTION BY NUCLEIC ACID (DNA OR RNA); SEVERE ACUTE RESPIRATORY SYNDROME CORONAVIRUS 2 (SARS-COV-2) (CORONAVIRUS DISEASE [COVID-19]), AMPLIFIED PROBE TECHNIQUE, MAKING USE OF HIGH THROUGHPUT TECHNOLOGIES AS DESCRIBED BY CMS-2020-01-R: HCPCS

## 2021-02-26 PROCEDURE — 3008F PR BODY MASS INDEX (BMI) DOCUMENTED: ICD-10-PCS | Mod: CPTII,S$GLB,, | Performed by: FAMILY MEDICINE

## 2021-02-26 PROCEDURE — 99999 PR PBB SHADOW E&M-EST. PATIENT-LVL IV: ICD-10-PCS | Mod: PBBFAC,,, | Performed by: FAMILY MEDICINE

## 2021-02-26 PROCEDURE — 99396 PR PREVENTIVE VISIT,EST,40-64: ICD-10-PCS | Mod: S$GLB,,, | Performed by: FAMILY MEDICINE

## 2021-02-26 PROCEDURE — U0005 INFEC AGEN DETEC AMPLI PROBE: HCPCS

## 2021-02-27 DIAGNOSIS — U07.1 COVID-19 VIRUS DETECTED: ICD-10-CM

## 2021-02-27 LAB — SARS-COV-2 RNA RESP QL NAA+PROBE: DETECTED

## 2021-03-01 ENCOUNTER — TELEPHONE (OUTPATIENT)
Dept: FAMILY MEDICINE | Facility: CLINIC | Age: 51
End: 2021-03-01

## 2021-03-04 ENCOUNTER — PATIENT MESSAGE (OUTPATIENT)
Dept: FAMILY MEDICINE | Facility: CLINIC | Age: 51
End: 2021-03-04

## 2021-03-04 DIAGNOSIS — U07.1 COVID-19: Primary | ICD-10-CM

## 2021-03-04 RX ORDER — AZELASTINE 1 MG/ML
1 SPRAY, METERED NASAL 2 TIMES DAILY
Qty: 30 ML | Refills: 0 | Status: SHIPPED | OUTPATIENT
Start: 2021-03-04 | End: 2021-05-18

## 2021-03-05 ENCOUNTER — PATIENT MESSAGE (OUTPATIENT)
Dept: HEMATOLOGY/ONCOLOGY | Facility: CLINIC | Age: 51
End: 2021-03-05

## 2021-03-05 ENCOUNTER — INFUSION (OUTPATIENT)
Dept: INFECTIOUS DISEASES | Facility: HOSPITAL | Age: 51
End: 2021-03-05
Attending: INTERNAL MEDICINE
Payer: COMMERCIAL

## 2021-03-05 VITALS
BODY MASS INDEX: 30.36 KG/M2 | TEMPERATURE: 99 F | WEIGHT: 205 LBS | RESPIRATION RATE: 16 BRPM | HEART RATE: 65 BPM | OXYGEN SATURATION: 100 % | HEIGHT: 69 IN | DIASTOLIC BLOOD PRESSURE: 84 MMHG | SYSTOLIC BLOOD PRESSURE: 142 MMHG

## 2021-03-05 DIAGNOSIS — C92.10 CML (CHRONIC MYELOCYTIC LEUKEMIA): ICD-10-CM

## 2021-03-05 DIAGNOSIS — D72.829 LEUKOCYTOSIS, UNSPECIFIED TYPE: ICD-10-CM

## 2021-03-05 DIAGNOSIS — U07.1 COVID-19: ICD-10-CM

## 2021-03-05 DIAGNOSIS — C92.10 CML (CHRONIC MYELOCYTIC LEUKEMIA): Primary | ICD-10-CM

## 2021-03-05 PROCEDURE — 63600175 PHARM REV CODE 636 W HCPCS

## 2021-03-05 PROCEDURE — M0239 BAMLANIVIMAB-XXXX INFUSION: HCPCS

## 2021-03-05 PROCEDURE — 25000003 PHARM REV CODE 250

## 2021-03-05 RX ORDER — ACETAMINOPHEN 325 MG/1
650 TABLET ORAL ONCE AS NEEDED
Status: ACTIVE | OUTPATIENT
Start: 2021-03-05 | End: 2032-08-01

## 2021-03-05 RX ORDER — ALBUTEROL SULFATE 90 UG/1
2 AEROSOL, METERED RESPIRATORY (INHALATION)
Status: ACTIVE | OUTPATIENT
Start: 2021-03-05

## 2021-03-05 RX ORDER — SODIUM CHLORIDE 0.9 % (FLUSH) 0.9 %
10 SYRINGE (ML) INJECTION
Status: ACTIVE | OUTPATIENT
Start: 2021-03-05

## 2021-03-05 RX ORDER — EPINEPHRINE 0.3 MG/.3ML
0.3 INJECTION SUBCUTANEOUS
Status: DISPENSED | OUTPATIENT
Start: 2021-03-05

## 2021-03-05 RX ORDER — DIPHENHYDRAMINE HYDROCHLORIDE 50 MG/ML
25 INJECTION INTRAMUSCULAR; INTRAVENOUS ONCE AS NEEDED
Status: ACTIVE | OUTPATIENT
Start: 2021-03-05 | End: 2032-08-01

## 2021-03-05 RX ORDER — ONDANSETRON 4 MG/1
4 TABLET, ORALLY DISINTEGRATING ORAL ONCE AS NEEDED
Status: ACTIVE | OUTPATIENT
Start: 2021-03-05 | End: 2032-08-01

## 2021-03-05 RX ADMIN — SODIUM CHLORIDE 700 MG: 9 INJECTION, SOLUTION INTRAVENOUS at 11:03

## 2021-03-08 ENCOUNTER — PATIENT MESSAGE (OUTPATIENT)
Dept: ADMINISTRATIVE | Facility: HOSPITAL | Age: 51
End: 2021-03-08

## 2021-03-10 ENCOUNTER — PATIENT MESSAGE (OUTPATIENT)
Dept: HEMATOLOGY/ONCOLOGY | Facility: CLINIC | Age: 51
End: 2021-03-10

## 2021-03-10 DIAGNOSIS — Z12.11 COLON CANCER SCREENING: ICD-10-CM

## 2021-03-11 ENCOUNTER — PATIENT OUTREACH (OUTPATIENT)
Dept: INFECTIOUS DISEASES | Facility: HOSPITAL | Age: 51
End: 2021-03-11

## 2021-03-11 ENCOUNTER — TELEPHONE (OUTPATIENT)
Dept: FAMILY MEDICINE | Facility: CLINIC | Age: 51
End: 2021-03-11

## 2021-03-11 DIAGNOSIS — U07.1 COVID-19: Primary | ICD-10-CM

## 2021-03-11 DIAGNOSIS — R50.9 FEVER, UNSPECIFIED FEVER CAUSE: ICD-10-CM

## 2021-03-11 RX ORDER — AZITHROMYCIN 250 MG/1
TABLET, FILM COATED ORAL
Qty: 6 TABLET | Refills: 0 | Status: SHIPPED | OUTPATIENT
Start: 2021-03-11 | End: 2021-03-16

## 2021-03-22 ENCOUNTER — SPECIALTY PHARMACY (OUTPATIENT)
Dept: PHARMACY | Facility: CLINIC | Age: 51
End: 2021-03-22

## 2021-03-22 ENCOUNTER — PATIENT MESSAGE (OUTPATIENT)
Dept: PHARMACY | Facility: CLINIC | Age: 51
End: 2021-03-22

## 2021-03-23 ENCOUNTER — DOCUMENTATION ONLY (OUTPATIENT)
Dept: HEMATOLOGY/ONCOLOGY | Facility: CLINIC | Age: 51
End: 2021-03-23

## 2021-03-24 ENCOUNTER — SPECIALTY PHARMACY (OUTPATIENT)
Dept: PHARMACY | Facility: CLINIC | Age: 51
End: 2021-03-24

## 2021-04-01 ENCOUNTER — PATIENT MESSAGE (OUTPATIENT)
Dept: ADMINISTRATIVE | Facility: HOSPITAL | Age: 51
End: 2021-04-01

## 2021-04-19 ENCOUNTER — SPECIALTY PHARMACY (OUTPATIENT)
Dept: PHARMACY | Facility: CLINIC | Age: 51
End: 2021-04-19

## 2021-04-20 ENCOUNTER — DOCUMENTATION ONLY (OUTPATIENT)
Dept: HEMATOLOGY/ONCOLOGY | Facility: CLINIC | Age: 51
End: 2021-04-20

## 2021-04-28 ENCOUNTER — PATIENT MESSAGE (OUTPATIENT)
Dept: HEMATOLOGY/ONCOLOGY | Facility: CLINIC | Age: 51
End: 2021-04-28

## 2021-04-28 ENCOUNTER — OFFICE VISIT (OUTPATIENT)
Dept: HEMATOLOGY/ONCOLOGY | Facility: CLINIC | Age: 51
End: 2021-04-28
Payer: COMMERCIAL

## 2021-04-28 VITALS
HEART RATE: 77 BPM | TEMPERATURE: 98 F | SYSTOLIC BLOOD PRESSURE: 140 MMHG | RESPIRATION RATE: 12 BRPM | WEIGHT: 201.81 LBS | DIASTOLIC BLOOD PRESSURE: 65 MMHG | HEIGHT: 69 IN | BODY MASS INDEX: 29.89 KG/M2 | OXYGEN SATURATION: 99 %

## 2021-04-28 DIAGNOSIS — D63.0 ANEMIA IN NEOPLASTIC DISEASE: ICD-10-CM

## 2021-04-28 DIAGNOSIS — R16.1 SPLENOMEGALY: ICD-10-CM

## 2021-04-28 DIAGNOSIS — C92.10 CML (CHRONIC MYELOCYTIC LEUKEMIA): Primary | ICD-10-CM

## 2021-04-28 PROCEDURE — 3008F PR BODY MASS INDEX (BMI) DOCUMENTED: ICD-10-PCS | Mod: CPTII,S$GLB,, | Performed by: INTERNAL MEDICINE

## 2021-04-28 PROCEDURE — 1125F PR PAIN SEVERITY QUANTIFIED, PAIN PRESENT: ICD-10-PCS | Mod: S$GLB,,, | Performed by: INTERNAL MEDICINE

## 2021-04-28 PROCEDURE — 3008F BODY MASS INDEX DOCD: CPT | Mod: CPTII,S$GLB,, | Performed by: INTERNAL MEDICINE

## 2021-04-28 PROCEDURE — 99999 PR PBB SHADOW E&M-EST. PATIENT-LVL III: CPT | Mod: PBBFAC,,, | Performed by: INTERNAL MEDICINE

## 2021-04-28 PROCEDURE — 99999 PR PBB SHADOW E&M-EST. PATIENT-LVL III: ICD-10-PCS | Mod: PBBFAC,,, | Performed by: INTERNAL MEDICINE

## 2021-04-28 PROCEDURE — 99215 OFFICE O/P EST HI 40 MIN: CPT | Mod: S$GLB,,, | Performed by: INTERNAL MEDICINE

## 2021-04-28 PROCEDURE — 99215 PR OFFICE/OUTPT VISIT, EST, LEVL V, 40-54 MIN: ICD-10-PCS | Mod: S$GLB,,, | Performed by: INTERNAL MEDICINE

## 2021-04-28 PROCEDURE — 1125F AMNT PAIN NOTED PAIN PRSNT: CPT | Mod: S$GLB,,, | Performed by: INTERNAL MEDICINE

## 2021-05-04 ENCOUNTER — PATIENT MESSAGE (OUTPATIENT)
Dept: RESEARCH | Facility: HOSPITAL | Age: 51
End: 2021-05-04

## 2021-05-06 ENCOUNTER — TELEPHONE (OUTPATIENT)
Dept: PHARMACY | Facility: CLINIC | Age: 51
End: 2021-05-06

## 2021-05-10 ENCOUNTER — PATIENT MESSAGE (OUTPATIENT)
Dept: RESEARCH | Facility: HOSPITAL | Age: 51
End: 2021-05-10

## 2021-05-18 ENCOUNTER — SPECIALTY PHARMACY (OUTPATIENT)
Dept: PHARMACY | Facility: CLINIC | Age: 51
End: 2021-05-18

## 2021-05-18 DIAGNOSIS — C92.10 CML (CHRONIC MYELOCYTIC LEUKEMIA): Primary | ICD-10-CM

## 2021-05-18 DIAGNOSIS — C92.10 CML (CHRONIC MYELOCYTIC LEUKEMIA): ICD-10-CM

## 2021-05-18 RX ORDER — IMATINIB MESYLATE 400 MG/1
TABLET, FILM COATED ORAL
Qty: 30 TABLET | Refills: 3 | Status: SHIPPED | OUTPATIENT
Start: 2021-05-18 | End: 2021-06-15

## 2021-05-20 ENCOUNTER — DOCUMENTATION ONLY (OUTPATIENT)
Dept: HEMATOLOGY/ONCOLOGY | Facility: CLINIC | Age: 51
End: 2021-05-20

## 2021-05-24 ENCOUNTER — DOCUMENTATION ONLY (OUTPATIENT)
Dept: HEMATOLOGY/ONCOLOGY | Facility: CLINIC | Age: 51
End: 2021-05-24

## 2021-05-31 PROBLEM — Z00.00 GENERAL MEDICAL EXAM: Status: RESOLVED | Noted: 2021-02-26 | Resolved: 2021-05-31

## 2021-06-07 ENCOUNTER — SPECIALTY PHARMACY (OUTPATIENT)
Dept: PHARMACY | Facility: CLINIC | Age: 51
End: 2021-06-07

## 2021-06-07 DIAGNOSIS — C92.10 CML (CHRONIC MYELOCYTIC LEUKEMIA): Primary | ICD-10-CM

## 2021-06-07 DIAGNOSIS — C92.10 CML (CHRONIC MYELOCYTIC LEUKEMIA): ICD-10-CM

## 2021-06-07 RX ORDER — IMATINIB MESYLATE 100 MG/1
600 TABLET, FILM COATED ORAL DAILY
Qty: 180 TABLET | Refills: 11 | Status: SHIPPED | OUTPATIENT
Start: 2021-06-07 | End: 2022-06-17 | Stop reason: SDUPTHER

## 2021-06-15 ENCOUNTER — TELEPHONE (OUTPATIENT)
Dept: PHARMACY | Facility: CLINIC | Age: 51
End: 2021-06-15

## 2021-06-18 ENCOUNTER — SPECIALTY PHARMACY (OUTPATIENT)
Dept: PHARMACY | Facility: CLINIC | Age: 51
End: 2021-06-18

## 2021-06-18 DIAGNOSIS — C92.10 CML (CHRONIC MYELOCYTIC LEUKEMIA): Primary | ICD-10-CM

## 2021-07-06 ENCOUNTER — PATIENT MESSAGE (OUTPATIENT)
Dept: ADMINISTRATIVE | Facility: HOSPITAL | Age: 51
End: 2021-07-06

## 2021-07-16 ENCOUNTER — SPECIALTY PHARMACY (OUTPATIENT)
Dept: PHARMACY | Facility: CLINIC | Age: 51
End: 2021-07-16

## 2021-07-30 ENCOUNTER — TELEPHONE (OUTPATIENT)
Dept: FAMILY MEDICINE | Facility: CLINIC | Age: 51
End: 2021-07-30

## 2021-07-30 ENCOUNTER — PATIENT MESSAGE (OUTPATIENT)
Dept: FAMILY MEDICINE | Facility: CLINIC | Age: 51
End: 2021-07-30

## 2021-08-09 ENCOUNTER — PATIENT MESSAGE (OUTPATIENT)
Dept: HEMATOLOGY/ONCOLOGY | Facility: CLINIC | Age: 51
End: 2021-08-09

## 2021-08-10 ENCOUNTER — SPECIALTY PHARMACY (OUTPATIENT)
Dept: PHARMACY | Facility: CLINIC | Age: 51
End: 2021-08-10

## 2021-08-19 ENCOUNTER — OFFICE VISIT (OUTPATIENT)
Dept: HEMATOLOGY/ONCOLOGY | Facility: CLINIC | Age: 51
End: 2021-08-19
Payer: COMMERCIAL

## 2021-08-19 VITALS
WEIGHT: 198 LBS | HEART RATE: 76 BPM | DIASTOLIC BLOOD PRESSURE: 74 MMHG | RESPIRATION RATE: 18 BRPM | HEIGHT: 69 IN | OXYGEN SATURATION: 100 % | TEMPERATURE: 99 F | BODY MASS INDEX: 29.33 KG/M2 | SYSTOLIC BLOOD PRESSURE: 156 MMHG

## 2021-08-19 DIAGNOSIS — C92.10 CML (CHRONIC MYELOCYTIC LEUKEMIA): Primary | ICD-10-CM

## 2021-08-19 DIAGNOSIS — D63.0 ANEMIA IN NEOPLASTIC DISEASE: ICD-10-CM

## 2021-08-19 PROCEDURE — 99215 PR OFFICE/OUTPT VISIT, EST, LEVL V, 40-54 MIN: ICD-10-PCS | Mod: S$GLB,,, | Performed by: INTERNAL MEDICINE

## 2021-08-19 PROCEDURE — 3008F PR BODY MASS INDEX (BMI) DOCUMENTED: ICD-10-PCS | Mod: CPTII,S$GLB,, | Performed by: INTERNAL MEDICINE

## 2021-08-19 PROCEDURE — 3077F PR MOST RECENT SYSTOLIC BLOOD PRESSURE >= 140 MM HG: ICD-10-PCS | Mod: CPTII,S$GLB,, | Performed by: INTERNAL MEDICINE

## 2021-08-19 PROCEDURE — 99215 OFFICE O/P EST HI 40 MIN: CPT | Mod: S$GLB,,, | Performed by: INTERNAL MEDICINE

## 2021-08-19 PROCEDURE — 3077F SYST BP >= 140 MM HG: CPT | Mod: CPTII,S$GLB,, | Performed by: INTERNAL MEDICINE

## 2021-08-19 PROCEDURE — 99999 PR PBB SHADOW E&M-EST. PATIENT-LVL III: ICD-10-PCS | Mod: PBBFAC,,, | Performed by: INTERNAL MEDICINE

## 2021-08-19 PROCEDURE — 99999 PR PBB SHADOW E&M-EST. PATIENT-LVL III: CPT | Mod: PBBFAC,,, | Performed by: INTERNAL MEDICINE

## 2021-08-19 PROCEDURE — 1126F PR PAIN SEVERITY QUANTIFIED, NO PAIN PRESENT: ICD-10-PCS | Mod: CPTII,S$GLB,, | Performed by: INTERNAL MEDICINE

## 2021-08-19 PROCEDURE — 1159F MED LIST DOCD IN RCRD: CPT | Mod: CPTII,S$GLB,, | Performed by: INTERNAL MEDICINE

## 2021-08-19 PROCEDURE — 3078F PR MOST RECENT DIASTOLIC BLOOD PRESSURE < 80 MM HG: ICD-10-PCS | Mod: CPTII,S$GLB,, | Performed by: INTERNAL MEDICINE

## 2021-08-19 PROCEDURE — 1126F AMNT PAIN NOTED NONE PRSNT: CPT | Mod: CPTII,S$GLB,, | Performed by: INTERNAL MEDICINE

## 2021-08-19 PROCEDURE — 3008F BODY MASS INDEX DOCD: CPT | Mod: CPTII,S$GLB,, | Performed by: INTERNAL MEDICINE

## 2021-08-19 PROCEDURE — 3078F DIAST BP <80 MM HG: CPT | Mod: CPTII,S$GLB,, | Performed by: INTERNAL MEDICINE

## 2021-08-19 PROCEDURE — 1159F PR MEDICATION LIST DOCUMENTED IN MEDICAL RECORD: ICD-10-PCS | Mod: CPTII,S$GLB,, | Performed by: INTERNAL MEDICINE

## 2021-08-23 ENCOUNTER — SPECIALTY PHARMACY (OUTPATIENT)
Dept: PHARMACY | Facility: CLINIC | Age: 51
End: 2021-08-23

## 2021-09-18 ENCOUNTER — PATIENT MESSAGE (OUTPATIENT)
Dept: PHARMACY | Facility: CLINIC | Age: 51
End: 2021-09-18

## 2021-09-18 ENCOUNTER — SPECIALTY PHARMACY (OUTPATIENT)
Dept: PHARMACY | Facility: CLINIC | Age: 51
End: 2021-09-18

## 2021-10-04 NOTE — PROGRESS NOTES
Hematology and Medical Oncology   Follow Up --Virtual Visit     Primary Hematologic Diagnosis: CML    History of Present Ilness:   Romeo Couch Jr. (Romeo) is a pleasant 50 y.o.male who presents for follow-up of CML.    Hematology History:  --Presented to [facility] ED on 4/23/19. He reported that he went to his PCP with left abdominal pain and temp of 100.4 the day before and was sent by PCP for CT. CT showed splenomegaly. Pt was instructed to go to ED. Went to [facility] ED and was found to have leukocytosis. Wanted to transfer patient to [facility], but no beds were available. Patient reportedly left [facility] ED AMA and came to [facility] ED over night. WBC 238K. Suspicious for acute leukemia. He denies any aches, chills, n/v/d, constipation, chest pain, bleeding or bruising. C/o SOB on exertion and mild, non-productive cough. Patient states that he has lost 8 lbs in the last few weeks.  --Peripheral smear was reviewed in the ED which was highly suggestive of CML  --Bone marrow biopsy on 4/25/19: CHRONIC MYELOID LEUKEMIA, BCR-ABL1-POSITIVE, CHRONIC PHASE. FOCAL GRADE 2 RETICULAR FIBROSIS  --BCR-ABL P210 80%  --Began imatinib on Saturday May 11, 2019    WBC trend:238-->220-->200-->175 --> 133k --> 113k--> 39k -->7.9k -->4.6k->2.6k    Interval History:  Patient doing well, sometimes has muscle cramps. He started drinking pickle juice for this. He spends a lot of time outdoor, and there has been a heat wave recently. His energy level remains good.    PAST MEDICAL HISTORY:   Past Medical History:   Diagnosis Date    Leukocytosis     Pneumothorax     27 yo       PAST SURGICAL HISTORY:   Past Surgical History:   Procedure Laterality Date    BONE MARROW BIOPSY Left 4/25/2019    Procedure: Biopsy-bone marrow;  Surgeon: Catalina Rucker MD;  Location: [facility] OR [address] [place] [address];  Service: Orthopedics;  Laterality: Left;    right shoulder surgery      right rotator cuff       PAST SOCIAL HISTORY:   reports that he has never smoked. His  smokeless tobacco use includes snuff. He reports current alcohol use. He reports that he does not use drugs.    FAMILY HISTORY:  Family History   Problem Relation Age of Onset    Cancer Mother         Ovarian     Heart disease Neg Hx        CURRENT MEDICATIONS:   Current Outpatient Medications   Medication Sig    imatinib (GLEEVEC) 100 MG Tab Take 2 tablets (200 mg total) by mouth once daily with 1 other imatinib prescription.    imatinib (GLEEVEC) 400 MG Tab Take 1 tablet by mouth once daily at the same time with food and a large glass of water. Take with two 100mg tablets for a total dose of 600mg. Avoid grapefruit products.    multivitamin (THERAGRAN) per tablet Take 1 tablet by mouth once daily.     No current facility-administered medications for this visit.      ALLERGIES:   Review of patient's allergies indicates:  No Known Allergies      Review of Systems:     Review of Systems   Constitutional: Negative for appetite change, chills, diaphoresis, fatigue, fever and unexpected weight change.   HENT:   Negative for hearing loss, mouth sores, nosebleeds, sore throat, trouble swallowing and voice change.    Eyes: Negative for eye problems and icterus.   Respiratory: Negative for chest tightness, cough, hemoptysis, shortness of breath and wheezing.    Cardiovascular: Negative for chest pain, leg swelling and palpitations.   Gastrointestinal: Negative for abdominal distention, abdominal pain, blood in stool, diarrhea, nausea and vomiting.   Endocrine: Negative for hot flashes.   Genitourinary: Negative for bladder incontinence, difficulty urinating, dysuria, frequency and hematuria.    Musculoskeletal: Positive for myalgias. Negative for arthralgias, back pain, flank pain, gait problem, neck pain and neck stiffness.   Skin: Negative for itching, rash and wound.   Neurological: Negative for dizziness, extremity weakness, gait problem, headaches, numbness, seizures and speech difficulty.   Hematological:  Negative for adenopathy. Does not bruise/bleed easily.   Psychiatric/Behavioral: Negative for confusion, depression and sleep disturbance. The patient is not nervous/anxious.           Physical Exam:     Limited secondary to virtual visit    ECOG Performance Status: (foot note - ECOG PS provided by Eastern Cooperative Oncology Group) 0 - Asymptomatic    Karnofsky Performance Score:  90%- Able to Carry on Normal Activity: Minor Symptoms of Disease    Labs:   Lab Results   Component Value Date    WBC 4.73 07/06/2020    HGB 14.4 07/06/2020    HCT 43.6 07/06/2020     07/06/2020    ALT 44 07/06/2020    AST 52 (H) 07/06/2020     07/06/2020    K 4.1 07/06/2020     07/06/2020    CREATININE 1.01 07/06/2020    BUN 16 07/06/2020    CO2 29 07/06/2020       Imaging:previous imaging has been reviewed     Assessment and Plan:     Mr. Couch is a 50 y.o. with CML    CML   --Began Imatinib 400mg daily on Sat May 11th, with a goal of of less that 10% BCR-ABL at 6 months  --Increased Imatinib to 600mg daily in November with a decrease in BCR-ABL from 60% to 28% to 19% to 10% and further to 2.6%  --Symptom management with calcium supplements and tonic water  --Will check labs q 3 months    Anemia  --Due to high CML burden  --Now normalized    Tobacco Cessation  --Counseling and guidance were provided    30 minutes were spent face to face with the patient to discuss the disease, natural history, treatment options and survival statistics. I have provided the patient with an opportunity to ask questions and have all questions answered to his satisfaction.     he will return to clinic in 3 months with labs 1 week earlier, but knows to call in the interim if symptoms change or should a problem arise.    The following was staffed and discussed with supervising physician Dr. Rucker.    Andree Ryan MD PGY-VI  Hematology/Oncology Fellow       Universal Safety Interventions

## 2021-10-16 ENCOUNTER — PATIENT MESSAGE (OUTPATIENT)
Dept: PHARMACY | Facility: CLINIC | Age: 51
End: 2021-10-16
Payer: COMMERCIAL

## 2021-10-20 ENCOUNTER — SPECIALTY PHARMACY (OUTPATIENT)
Dept: PHARMACY | Facility: CLINIC | Age: 51
End: 2021-10-20
Payer: COMMERCIAL

## 2021-11-01 ENCOUNTER — SPECIALTY PHARMACY (OUTPATIENT)
Dept: PHARMACY | Facility: CLINIC | Age: 51
End: 2021-11-01
Payer: COMMERCIAL

## 2021-11-01 DIAGNOSIS — C92.10 CML (CHRONIC MYELOCYTIC LEUKEMIA): Primary | ICD-10-CM

## 2021-11-18 ENCOUNTER — SPECIALTY PHARMACY (OUTPATIENT)
Dept: PHARMACY | Facility: CLINIC | Age: 51
End: 2021-11-18
Payer: COMMERCIAL

## 2021-11-24 ENCOUNTER — OFFICE VISIT (OUTPATIENT)
Dept: HEMATOLOGY/ONCOLOGY | Facility: CLINIC | Age: 51
End: 2021-11-24
Payer: COMMERCIAL

## 2021-11-24 VITALS
RESPIRATION RATE: 16 BRPM | SYSTOLIC BLOOD PRESSURE: 130 MMHG | BODY MASS INDEX: 29.86 KG/M2 | OXYGEN SATURATION: 98 % | DIASTOLIC BLOOD PRESSURE: 89 MMHG | HEIGHT: 69 IN | HEART RATE: 70 BPM | WEIGHT: 201.63 LBS

## 2021-11-24 DIAGNOSIS — C92.10 CML (CHRONIC MYELOCYTIC LEUKEMIA): Primary | ICD-10-CM

## 2021-11-24 DIAGNOSIS — R16.1 SPLENOMEGALY: ICD-10-CM

## 2021-11-24 PROCEDURE — 99999 PR PBB SHADOW E&M-EST. PATIENT-LVL III: ICD-10-PCS | Mod: PBBFAC,,, | Performed by: INTERNAL MEDICINE

## 2021-11-24 PROCEDURE — 99215 OFFICE O/P EST HI 40 MIN: CPT | Mod: S$GLB,,, | Performed by: INTERNAL MEDICINE

## 2021-11-24 PROCEDURE — 99999 PR PBB SHADOW E&M-EST. PATIENT-LVL III: CPT | Mod: PBBFAC,,, | Performed by: INTERNAL MEDICINE

## 2021-11-24 PROCEDURE — 99215 PR OFFICE/OUTPT VISIT, EST, LEVL V, 40-54 MIN: ICD-10-PCS | Mod: S$GLB,,, | Performed by: INTERNAL MEDICINE

## 2021-12-21 ENCOUNTER — SPECIALTY PHARMACY (OUTPATIENT)
Dept: PHARMACY | Facility: CLINIC | Age: 51
End: 2021-12-21
Payer: COMMERCIAL

## 2022-01-21 ENCOUNTER — SPECIALTY PHARMACY (OUTPATIENT)
Dept: PHARMACY | Facility: CLINIC | Age: 52
End: 2022-01-21
Payer: COMMERCIAL

## 2022-01-21 NOTE — TELEPHONE ENCOUNTER
Specialty Pharmacy - Clinical Reassessment    Specialty Medication Orders Linked to Encounter    Flowsheet Row Most Recent Value   Medication #1 imatinib (GLEEVEC) 100 MG Tab (Order#[id number], Rx#[id number])        Chelsie Couch Jr. is a 52 y.o. male, who is followed by the specialty pharmacy service for management and education.    Recent Encounters     Date Type Provider Description    01/21/2022 Specialty Pharmacy Nelly Crowe PharmD Follow-up Clinical Reassessment    01/21/2022 Specialty Pharmacy Nelly Crowe PharmD Refill Coordination    01/21/2022 Specialty Pharmacy JOSIAS SAMANIEGO Refill Coordination; Referral Authorization    12/21/2021 Specialty Pharmacy Yadira Molina Refill Coordination    11/18/2021 Specialty Pharmacy Darwin Rae Refill Coordination        Clinical call attempts since last clinical assessment   No call attempts found.     Today he received follow up education for his specialty medication(s).    Current Outpatient Medications   Medication Sig    fluticasone propionate (FLONASE) 50 mcg/actuation nasal spray 2 sprays by Each Nostril route once daily.    imatinib (GLEEVEC) 100 MG Tab Take 6 tablets (600 mg total) by mouth once daily.   Last reviewed on 11/24/2021 10:40 AM by Avis Fang MA    Review of patient's allergies indicates:  No Known AllergiesLast reviewed on  11/24/2021 10:40 AM by Avis Fang    Drug Interactions    Clinically relevant drug interactions identified: no       Medication Adherence    Patient reported X missed doses in the last month: 0  Any gaps in refill history greater than 2 weeks in the last 3 months: no  Demonstrates understanding of importance of adherence: yes  Informant: patient  Reliability of informant: reliable  Provider-estimated medication adherence level: good  Reasons for non-adherence: no problems identified   Other adherence tool: Daily routines   Support network for adherence: family member  Confirmed plan  "for next specialty medication refill: not applicable  Refills needed for supportive medications: not needed       Adverse Effects    *All other systems reviewed and are negative       Assessment Questions - Documented Responses    Flowsheet Row Most Recent Value   Assessment    Medication Reconciliation completed for patient Yes   During the past 4 weeks, has patient missed any activities due to condition or medication? No   During the past 4 weeks, did patient have any of the following urgent care visits? None   Goals of Therapy Status Achieving   Welcome packet contents reviewed and discussed with patient? No   Assesment completed? Yes   Plan Therapy continued   Do you need to open a clinical intervention (i-vent)? No   Do you want to schedule first shipment? No   Medication #1 Assessment Info    Patient status Existing medication, Exisiting to [facility]   Is this medication appropriate for the patient? Yes   Is this medication effective? Yes          Objective    He has a past medical history of Leukocytosis and Pneumothorax.    Tried/failed medications: N/a     BP Readings from Last 4 Encounters:   11/24/21 130/89   08/19/21 (!) 156/74   04/28/21 (!) 140/65   03/05/21 (!) 142/84     Ht Readings from Last 4 Encounters:   11/24/21 5' 9" (1.753 m)   08/19/21 5' 9" (1.753 m)   04/28/21 5' 9" (1.753 m)   03/05/21 5' 9" (1.753 m)     Wt Readings from Last 4 Encounters:   11/24/21 91.4 kg (201 lb 9.8 oz)   08/19/21 89.8 kg (197 lb 15.6 oz)   04/28/21 91.6 kg (201 lb 13.3 oz)   03/05/21 93 kg (205 lb)     Recent Labs   Lab Result Units 11/17/21  1249   RBC M/uL 4.04 L   Hemoglobin g/dL 13.6 L   Hematocrit % 39.9 L   WBC K/uL 6.42   Gran # (ANC) K/uL 3.9   Gran % % 60.3   Platelets K/uL 232   Sodium mmol/L 140   Potassium mmol/L 4.1   Chloride mmol/L 104   Glucose mg/dL 104   BUN mg/dL 12   Creatinine mg/dL 1.24   Calcium mg/dL 9.2   Total Protein g/dL 6.8   Albumin g/dL 4.4   Total Bilirubin mg/dL 0.7   Alkaline Phosphatase " "U/L 49   AST U/L 50 H   ALT U/L 33     The goals of cancer treatment include:  · Achieving remission of cancer, if possible  · Reducing tumor size and spread of cancer, if remission is not possible  · Minimizing pain and symptoms of the cancer  · Preventing infection and other complications of treatment  · Promoting adequate nutrition  · Encouraging proper hydration  · Improving or maintaining quality of life  · Maintaining optimal therapy adherence  · Minimizing and managing side effects    Goals of Therapy Status: Achieving    Assessment/Plan  Patient plans to continue therapy without changes      Indication, dosage, appropriateness, effectiveness, safety and convenience of his specialty medication(s) were reviewed today.     Patient Counseling    Counseled the patient on the following: doses and administration discussed, safe handling, storage, and disposal discussed, possible adverse effects and management discussed, possible drug and prescription drug interactions discussed, possible drug and OTC drug and food interactions discussed, lab monitoring and follow-up discussed, therapeutic rationale discussed, cost of medications and cost implications discussed, adherence and missed doses discussed, pharmacy contact information discussed       Dosing, how taking: Patient reports taking his Gleevec correctly and taking 6 tablets (100 mg tablets) of the Gleevec once nightly.   Storage: Patient reports storing the Gleevec in his bedroom dresser.   Handling: Patient reports putting the Gleevec in his mouth without touching it.   Side effects: Patient denies experiencing any side effects. Patient declined a full review of side effects and mentioned that he is comfortable with taking the Gleevec.    Recent infections: Patient denies any recent infections.   Pain: Patient denies being in any pain.  Appetite: Patient reports having "too good" of an appetite. He reports eating about 2-3 meals a day.   Energy, fatigue: " Patient reports being pretty energetic. He reports in the mornings he doesn't really eat much but in the afternoon and dinner time he eats big meals and feels much more energetic.   Health, mood, QOL: Overall, the patient is able to complete is ADLs and was in great spirits. He reports having a great QOL.   ED/UC visits: Patient denies any recent ED/UC visits.   Next clinical follow up: 3/14 with oncologist.   Medication list reviewed. No new allergies or health conditions.     Labs reviewed from:  11/17/21          Tasks added this encounter   4/14/2022 - Clinical - Follow Up Assesement (90 day)   Tasks due within next 3 months   2/16/2022 - Refill Call (Auto Added)     Nelly Crowe, PharmD  [facility]  [address] [place] [id number]  Phone: [phone]  Fax: [phone]

## 2022-01-31 ENCOUNTER — OFFICE VISIT (OUTPATIENT)
Dept: FAMILY MEDICINE | Facility: CLINIC | Age: 52
End: 2022-01-31
Payer: COMMERCIAL

## 2022-01-31 ENCOUNTER — PATIENT MESSAGE (OUTPATIENT)
Dept: FAMILY MEDICINE | Facility: CLINIC | Age: 52
End: 2022-01-31

## 2022-01-31 VITALS
SYSTOLIC BLOOD PRESSURE: 130 MMHG | HEART RATE: 79 BPM | DIASTOLIC BLOOD PRESSURE: 78 MMHG | OXYGEN SATURATION: 97 % | WEIGHT: 205 LBS | BODY MASS INDEX: 30.36 KG/M2 | HEIGHT: 69 IN

## 2022-01-31 DIAGNOSIS — R10.31 RIGHT LOWER QUADRANT PAIN: Primary | ICD-10-CM

## 2022-01-31 PROCEDURE — 3078F DIAST BP <80 MM HG: CPT | Mod: CPTII,S$GLB,, | Performed by: FAMILY MEDICINE

## 2022-01-31 PROCEDURE — 3075F SYST BP GE 130 - 139MM HG: CPT | Mod: CPTII,S$GLB,, | Performed by: FAMILY MEDICINE

## 2022-01-31 PROCEDURE — 3078F PR MOST RECENT DIASTOLIC BLOOD PRESSURE < 80 MM HG: ICD-10-PCS | Mod: CPTII,S$GLB,, | Performed by: FAMILY MEDICINE

## 2022-01-31 PROCEDURE — 3075F PR MOST RECENT SYSTOLIC BLOOD PRESS GE 130-139MM HG: ICD-10-PCS | Mod: CPTII,S$GLB,, | Performed by: FAMILY MEDICINE

## 2022-01-31 PROCEDURE — 3008F PR BODY MASS INDEX (BMI) DOCUMENTED: ICD-10-PCS | Mod: CPTII,S$GLB,, | Performed by: FAMILY MEDICINE

## 2022-01-31 PROCEDURE — 3008F BODY MASS INDEX DOCD: CPT | Mod: CPTII,S$GLB,, | Performed by: FAMILY MEDICINE

## 2022-01-31 PROCEDURE — 1159F MED LIST DOCD IN RCRD: CPT | Mod: CPTII,S$GLB,, | Performed by: FAMILY MEDICINE

## 2022-01-31 PROCEDURE — 99214 PR OFFICE/OUTPT VISIT, EST, LEVL IV, 30-39 MIN: ICD-10-PCS | Mod: S$GLB,,, | Performed by: FAMILY MEDICINE

## 2022-01-31 PROCEDURE — 99214 OFFICE O/P EST MOD 30 MIN: CPT | Mod: S$GLB,,, | Performed by: FAMILY MEDICINE

## 2022-01-31 PROCEDURE — 99999 PR PBB SHADOW E&M-EST. PATIENT-LVL V: CPT | Mod: PBBFAC,,, | Performed by: FAMILY MEDICINE

## 2022-01-31 PROCEDURE — 99999 PR PBB SHADOW E&M-EST. PATIENT-LVL V: ICD-10-PCS | Mod: PBBFAC,,, | Performed by: FAMILY MEDICINE

## 2022-01-31 PROCEDURE — 1159F PR MEDICATION LIST DOCUMENTED IN MEDICAL RECORD: ICD-10-PCS | Mod: CPTII,S$GLB,, | Performed by: FAMILY MEDICINE

## 2022-01-31 NOTE — PROGRESS NOTES
Answers for HPI/ROS submitted by the patient on 1/31/2022  Chronicity: new  Onset: in the past 7 days  Onset quality: sudden  Frequency: constantly  Episode duration: 24 hours  Progression since onset: waxing and waning  Pain location: periumbilical region  Pain - numeric: 7/10  Pain quality: a sensation of fullness  Radiates to: LLQ, RLQ, periumbilical region  anorexia: Yes  arthralgias: No  belching: Yes  constipation: No  diarrhea: Yes  dysuria: No  fever: Yes  flatus: No  frequency: No  headaches: No  hematochezia: No  hematuria: No  melena: No  myalgias: No  nausea: Yes  weight loss: No  vomiting: Yes  Aggravated by: certain positions  Relieved by: being still, sitting up  Pain severity: moderate  Treatments tried: acetaminophen  Improvement on treatment: no relief  abdominal surgery: No  colon cancer: No  Crohn's disease: No  gallstones: No  GERD: No  irritable bowel syndrome: No  kidney stones: No  pancreatitis: No  PUD: No  ulcerative colitis: No  UTI: No    [facility] Note    Chief Complaint      Chief Complaint   Patient presents with    Fever    Diarrhea     vomiting    Abdominal Pain     Since Saturday/bloating/loss appetite/stomach fullness     History of Present Illness      Romeo Couch Jr. is a 52 y.o. male who presents today with:    Patient is here with wife for 2 days of abdominal bloating, abdominal pain loss of appetite nausea vomiting and diarrhea patient also had fever Saturday night.  He is no longer having fever but is still having the abdominal pain.  He is still having the anorexia.  He no longer has any nausea vomiting a but is still having slight diarrhea.  Patient denies any bright red blood per rectum or melena.  Patient states that the pain is are worse around his umbilicus and towards his right lower quadrant.  He still has his gallbladder and appendix.  It does hurt with certain movements    Problem List Items Addressed This Visit    None     Visit  Diagnoses     Right lower quadrant pain    -  Primary    Relevant Orders    Comprehensive Metabolic Panel (Completed)    CBC Auto Differential (Completed)    Urinalysis, Reflex to Urine Culture Urine, Clean Catch (Completed)          Health Maintenance   Topic Date Due    Lipid Panel  Never done    TETANUS VACCINE  Never done    Hepatitis C Screening  Completed       Past Medical History:   Diagnosis Date    Leukocytosis     Pneumothorax     29 yo       Past Surgical History:   Procedure Laterality Date    BONE MARROW BIOPSY Left 4/25/2019    Procedure: Biopsy-bone marrow;  Surgeon: Catalina Rucker MD;  Location: [facility] OR [address] [place] [address];  Service: Orthopedics;  Laterality: Left;    right shoulder surgery      right rotator cuff       family history includes Cancer in his mother.     Social History     Tobacco Use    Smoking status: Never Smoker    Smokeless tobacco: Current User     Types: Snuff   Substance Use Topics    Alcohol use: Yes     Comment: occasionally on wkends    Drug use: No       Review of Systems   Constitutional: Positive for fever. Negative for chills, malaise/fatigue and weight loss.   HENT: Negative for congestion, ear discharge and ear pain.    Eyes: Negative for blurred vision.   Respiratory: Negative for cough and shortness of breath.    Cardiovascular: Negative for chest pain and leg swelling.   Gastrointestinal: Positive for abdominal pain, diarrhea, nausea and vomiting. Negative for constipation and melena.   Genitourinary: Negative for dysuria, frequency and hematuria.   Musculoskeletal: Negative for myalgias.   Skin: Negative for rash.   Neurological: Negative for dizziness, tingling, focal weakness, weakness and headaches.   Endo/Heme/Allergies: Does not bruise/bleed easily.   Psychiatric/Behavioral: Negative for depression and suicidal ideas.        Outpatient Encounter Medications as of 1/31/2022   Medication Sig Note Dispense Refill    imatinib (GLEEVEC) 100 MG Tab Take 6 tablets  "(600 mg total) by mouth once daily.  180 tablet 11    fluticasone propionate (FLONASE) 50 mcg/actuation nasal spray 2 sprays by Each Nostril route once daily. 11/19/2020: PRN       Facility-Administered Encounter Medications as of 1/31/2022   Medication Dose Route Frequency Provider Last Rate Last Admin    acetaminophen tablet 650 mg  650 mg Oral Once PRN Sharif Jain MD        albuterol inhaler 2 puff  2 puff Inhalation Q20 Min PRN Sharif Jain MD        diphenhydrAMINE injection 25 mg  25 mg Intravenous Once PRN Sharif Jain MD        EPINEPHrine (EPIPEN) 0.3 mg/0.3 mL pen injection 0.3 mg  0.3 mg Intramuscular PRN Sharif Jain MD        methylPREDNISolone sodium succinate injection 40 mg  40 mg Intravenous Once PRN Sharif Jain MD        ondansetron disintegrating tablet 4 mg  4 mg Oral Once PRN Sharif Jain MD        sodium chloride 0.9% 500 mL flush bag   Intravenous PRN Sharif Jain MD        sodium chloride 0.9% flush 10 mL  10 mL Intravenous PRN Sharif Jain MD           Review of patient's allergies indicates:  No Known Allergies    Physical Exam      Vital Signs  Pulse: 79  SpO2: 97 %  BP: 130/78  BP Location: Left arm  Patient Position: Sitting  Pain Score:   6  Pain Loc: Abdomen (when pushing in stomach)  Height and Weight  Height: 5' 9" (175.3 cm)  Weight: 93 kg (205 lb 0.4 oz)  BSA (Calculated - sq m): 2.13 sq meters  BMI (Calculated): 30.3  Weight in (lb) to have BMI = 25: 168.9]    Physical Exam  Vitals reviewed.   Constitutional:       General: He is not in acute distress.     Appearance: Normal appearance. He is normal weight. He is not ill-appearing.   HENT:      Head: Normocephalic.      Right Ear: Tympanic membrane normal.      Left Ear: Tympanic membrane normal.      Nose: Nose normal.      Mouth/Throat:      Mouth: Mucous membranes are moist.      Pharynx: Oropharynx is clear.   Eyes:      Extraocular Movements: Extraocular movements " intact.      Conjunctiva/sclera: Conjunctivae normal.      Pupils: Pupils are equal, round, and reactive to light.   Cardiovascular:      Rate and Rhythm: Normal rate and regular rhythm.      Pulses: Normal pulses.      Heart sounds: Normal heart sounds.   Pulmonary:      Effort: Pulmonary effort is normal.      Breath sounds: Normal breath sounds.   Abdominal:      General: Abdomen is flat. Bowel sounds are normal. There is no distension.      Palpations: Abdomen is soft.      Tenderness: There is no abdominal tenderness.          Comments: ttp in above areas.  Guarding noted.  Neg larson's sign. Pos rebound in rlq   Musculoskeletal:         General: Normal range of motion.      Cervical back: Normal range of motion and neck supple.   Skin:     General: Skin is warm and dry.      Capillary Refill: Capillary refill takes less than 2 seconds.      Findings: No rash.   Neurological:      General: No focal deficit present.      Mental Status: He is alert and oriented to person, place, and time.      Motor: No weakness.      Gait: Gait normal.   Psychiatric:         Mood and Affect: Mood normal.         Behavior: Behavior normal.         Thought Content: Thought content normal.         Judgment: Judgment normal.          Laboratory:  CBC:  Recent Labs   Lab Result Units 11/17/21  1249 11/17/21  1249 01/31/22  1513   WBC K/uL 6.42   < > 6.76   RBC M/uL 4.04*   < > 4.15*   Hemoglobin g/dL 13.6*   < > 13.9*   Hematocrit % 39.9*   < > 41.2   Platelets K/uL 232   < > 231   MCV fL 99*   < > 99*   MCH pg 33.7*   < > 33.5*   MCHC g/dL 34.1  --  33.7    < > = values in this interval not displayed.     CMP:  Recent Labs   Lab Result Units 11/17/21  1249 11/17/21  1249 01/31/22  1513   Glucose mg/dL 104   < > 110   Calcium mg/dL 9.2   < > 9.3   Albumin g/dL 4.4   < > 4.4   Total Protein g/dL 6.8   < > 6.9   Sodium mmol/L 140   < > 143   Potassium mmol/L 4.1   < > 4.5   CO2 mmol/L 29   < > 32*   Chloride mmol/L 104   < > 99   BUN  mg/dL 12   < > 11   Alkaline Phosphatase U/L 49   < > 43   ALT U/L 33   < > 30   AST U/L 50*   < > 38   Total Bilirubin mg/dL 0.7  --  0.7    < > = values in this interval not displayed.     URINALYSIS:  Recent Labs   Lab Result Units 01/31/22  1513   Color, UA  Yellow   Specific Gravity, UA  1.020   pH, UA  6.0   Protein, UA  Trace*   Nitrite, UA  Negative   Leukocytes, UA  Negative   Urobilinogen, UA EU/dL Negative      LIPIDS:  No results for input(s): TSH, HDL, CHOL, TRIG, LDLCALC, CHOLHDL, NONHDLCHOL, TOTALCHOLEST in the last 2160 hours.  TSH:  No results for input(s): TSH in the last 2160 hours.  A1C:  No results for input(s): HGBA1C in the last 2160 hours.    Radiology:      Assessment/Plan     Romeo Couch Jr. is a 52 y.o.male with:    1. Right lower quadrant pain  - Comprehensive Metabolic Panel; Future  - CBC Auto Differential; Future  - Urinalysis, Reflex to Urine Culture Urine, Clean Catch; Future    Concerned for appendicitis.  Will get labs and ct abd/pelvis.     CT came back pos for enteritis.  Told to hydrate, take pepto and probiotics. Chronic conditions stable.  Will continue to monitor.     Follow up as discussed    If symptoms worsen or do not improve return to clinic, go to Urgent Care or ER  Take Medications as directed      -Continue current medications and maintain follow up with specialists.         Donald W Fabacher Jr, MD [facility]

## 2022-02-16 ENCOUNTER — SPECIALTY PHARMACY (OUTPATIENT)
Dept: PHARMACY | Facility: CLINIC | Age: 52
End: 2022-02-16
Payer: COMMERCIAL

## 2022-02-18 NOTE — TELEPHONE ENCOUNTER
Specialty Pharmacy - Refill Coordination    Specialty Medication Orders Linked to Encounter    Flowsheet Row Most Recent Value   Medication #1 imatinib (GLEEVEC) 100 MG Tab (Order#[id number], Rx#[id number])          Refill Questions - Documented Responses    Flowsheet Row Most Recent Value   Patient Availability and HIPAA Verification    Does patient want to proceed with activity? Yes   HIPAA/medical authority confirmed? Yes   Relationship to patient of person spoken to? Self   Refill Screening Questions    Changes to allergies? No   Changes to medications? No   New conditions since last clinic visit? No   Unplanned office visit, urgent care, ED, or hospital admission in the last 4 weeks? No   How does patient/caregiver feel medication is working? Good   Financial problems or insurance changes? No   How many doses of your specialty medications were missed in the last 4 weeks? 0   Would patient like to speak to a pharmacist? No   When does the patient need to receive the medication? 02/24/22   Refill Delivery Questions    How will the patient receive the medication? Delivery [facility]   When does the patient need to receive the medication? 02/24/22   Shipping Address Home   Address in [facility] confirmed and updated if neccessary? Yes   Expected Copay ($) 250   Is the patient able to afford the medication copay? Yes   Payment Method CC on file   Days supply of Refill 30   Supplies needed? No supplies needed   Refill activity completed? Yes   Refill activity plan Refill scheduled   Shipment/Pickup Date: 02/22/22          Current Outpatient Medications   Medication Sig    fluticasone propionate (FLONASE) 50 mcg/actuation nasal spray 2 sprays by Each Nostril route once daily.    imatinib (GLEEVEC) 100 MG Tab Take 6 tablets (600 mg total) by mouth once daily.   Last reviewed on 1/31/2022  2:35 PM by Coleen James MA    Review of patient's allergies indicates:  No Known Allergies Last reviewed on  1/31/2022 4:21 PM  by Angelica Cline      Tasks added this encounter   3/18/2022 - Refill Call (Auto Added)   Tasks due within next 3 months   4/14/2022 - Clinical - Follow Up Assesement (90 day)     [facility]  [address]  [facility] [address]  Phone: [phone]  Fax: [phone]

## 2022-02-22 DIAGNOSIS — D84.9 IMMUNOSUPPRESSED STATUS: ICD-10-CM

## 2022-03-14 ENCOUNTER — OFFICE VISIT (OUTPATIENT)
Dept: HEMATOLOGY/ONCOLOGY | Facility: CLINIC | Age: 52
End: 2022-03-14
Payer: COMMERCIAL

## 2022-03-14 VITALS
BODY MASS INDEX: 30.68 KG/M2 | HEART RATE: 63 BPM | RESPIRATION RATE: 18 BRPM | SYSTOLIC BLOOD PRESSURE: 151 MMHG | HEIGHT: 69 IN | OXYGEN SATURATION: 100 % | WEIGHT: 207.13 LBS | DIASTOLIC BLOOD PRESSURE: 79 MMHG | TEMPERATURE: 98 F

## 2022-03-14 DIAGNOSIS — C92.10 CML (CHRONIC MYELOCYTIC LEUKEMIA): Primary | ICD-10-CM

## 2022-03-14 PROCEDURE — 3008F PR BODY MASS INDEX (BMI) DOCUMENTED: ICD-10-PCS | Mod: CPTII,S$GLB,, | Performed by: PHYSICIAN ASSISTANT

## 2022-03-14 PROCEDURE — 1159F MED LIST DOCD IN RCRD: CPT | Mod: CPTII,S$GLB,, | Performed by: PHYSICIAN ASSISTANT

## 2022-03-14 PROCEDURE — 3078F PR MOST RECENT DIASTOLIC BLOOD PRESSURE < 80 MM HG: ICD-10-PCS | Mod: CPTII,S$GLB,, | Performed by: PHYSICIAN ASSISTANT

## 2022-03-14 PROCEDURE — 99214 OFFICE O/P EST MOD 30 MIN: CPT | Mod: S$GLB,,, | Performed by: PHYSICIAN ASSISTANT

## 2022-03-14 PROCEDURE — 99214 PR OFFICE/OUTPT VISIT, EST, LEVL IV, 30-39 MIN: ICD-10-PCS | Mod: S$GLB,,, | Performed by: PHYSICIAN ASSISTANT

## 2022-03-14 PROCEDURE — 3008F BODY MASS INDEX DOCD: CPT | Mod: CPTII,S$GLB,, | Performed by: PHYSICIAN ASSISTANT

## 2022-03-14 PROCEDURE — 1159F PR MEDICATION LIST DOCUMENTED IN MEDICAL RECORD: ICD-10-PCS | Mod: CPTII,S$GLB,, | Performed by: PHYSICIAN ASSISTANT

## 2022-03-14 PROCEDURE — 3078F DIAST BP <80 MM HG: CPT | Mod: CPTII,S$GLB,, | Performed by: PHYSICIAN ASSISTANT

## 2022-03-14 PROCEDURE — 1160F PR REVIEW ALL MEDS BY PRESCRIBER/CLIN PHARMACIST DOCUMENTED: ICD-10-PCS | Mod: CPTII,S$GLB,, | Performed by: PHYSICIAN ASSISTANT

## 2022-03-14 PROCEDURE — 99999 PR PBB SHADOW E&M-EST. PATIENT-LVL IV: ICD-10-PCS | Mod: PBBFAC,,, | Performed by: PHYSICIAN ASSISTANT

## 2022-03-14 PROCEDURE — 3077F PR MOST RECENT SYSTOLIC BLOOD PRESSURE >= 140 MM HG: ICD-10-PCS | Mod: CPTII,S$GLB,, | Performed by: PHYSICIAN ASSISTANT

## 2022-03-14 PROCEDURE — 99999 PR PBB SHADOW E&M-EST. PATIENT-LVL IV: CPT | Mod: PBBFAC,,, | Performed by: PHYSICIAN ASSISTANT

## 2022-03-14 PROCEDURE — 3077F SYST BP >= 140 MM HG: CPT | Mod: CPTII,S$GLB,, | Performed by: PHYSICIAN ASSISTANT

## 2022-03-14 PROCEDURE — 1160F RVW MEDS BY RX/DR IN RCRD: CPT | Mod: CPTII,S$GLB,, | Performed by: PHYSICIAN ASSISTANT

## 2022-03-14 NOTE — PROGRESS NOTES
Section of Hematology and Stem Cell Transplantation  Follow Up Note     Visit Date: 03/14/2022    Primary Oncologic Diagnosis: No primary diagnosis found.    History of Present Ilness:   Romeo Couch Jr. (Romeo) is a pleasant 52 y.o.male with history of CML diagnosed in 2019, on Imatinib therapy.       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 He endorses ongoing compliance with his Imatinib and is feeling well today with a stable WBC. Tolerating TKI w/o any side effects. He denies any fevers, chills, night sweats, weight loss, early satiety, recurrent infections, lymphadenopathy. No CP/SOB/abdominal pain/N/V/D. Overall feel well without complaints. Pleased to her WBC stable at that BCR-ABL 0.2%.       Oncology History Summary:   --Presented to [facility] ED on 4/23/19. He reported that he went to his PCP with left abdominal pain and temp of 100.4 the day before and was sent by PCP for CT. CT showed splenomegaly. Pt was instructed to go to ED. Went to [facility] ED and was found to have leukocytosis. Wanted to transfer patient to [facility], but no beds were available. Patient reportedly left [facility] ED AMA and came to [facility] ED over night. WBC 238K. Suspicious for acute leukemia. He denies any aches, chills, n/v/d, constipation, chest pain, bleeding or bruising. C/o SOB on exertion and mild, non-productive cough. Patient states that he has lost 8 lbs in the last few weeks.  --Peripheral smear was reviewed in the ED which was highly suggestive of CML  --Bone marrow biopsy on 4/25/19: CHRONIC MYELOID LEUKEMIA, BCR-ABL1-POSITIVE, CHRONIC PHASE. FOCAL GRADE 2 RETICULAR FIBROSIS  --BCR-ABL P210 80%  --Began imatinib on Saturday May 11, 2019      Past Medical History, Social History, and Past Family History are unchanged since last evaluation except for HPI.     CURRENT MEDICATIONS:   Current Outpatient Medications   Medication Sig    fluticasone propionate (FLONASE) 50  mcg/actuation nasal spray 2 sprays by Each Nostril route once daily.    imatinib (GLEEVEC) 100 MG Tab Take 6 tablets (600 mg total) by mouth once daily.     Current Facility-Administered Medications   Medication    acetaminophen tablet 650 mg    albuterol inhaler 2 puff    diphenhydrAMINE injection 25 mg    EPINEPHrine (EPIPEN) 0.3 mg/0.3 mL pen injection 0.3 mg    methylPREDNISolone sodium succinate injection 40 mg    ondansetron disintegrating tablet 4 mg    sodium chloride 0.9% 500 mL flush bag    sodium chloride 0.9% flush 10 mL       ALLERGIES:   Review of patient's allergies indicates:  No Known Allergies      Review of Systems:     As noted in interval history, otherwise negative.    Physical Exam:     Vitals:    03/14/22 1119   BP: (!) 151/79   Pulse: 63   Resp: 18   Temp: 98.3 °F (36.8 °C)       Physical Exam  Constitutional:       General: He is not in acute distress.     Appearance: Normal appearance.   HENT:      Head: Normocephalic.      Right Ear: External ear normal.      Left Ear: External ear normal.      Nose: Nose normal.   Eyes:      Extraocular Movements: Extraocular movements intact.      Pupils: Pupils are equal, round, and reactive to light.   Cardiovascular:      Rate and Rhythm: Normal rate and regular rhythm.   Pulmonary:      Effort: Pulmonary effort is normal.      Breath sounds: Normal breath sounds.   Abdominal:      General: Abdomen is flat. Bowel sounds are normal.   Musculoskeletal:         General: No swelling.      Cervical back: Neck supple.   Skin:     General: Skin is warm.   Neurological:      General: No focal deficit present.      Mental Status: He is alert and oriented to person, place, and time.      Cranial Nerves: No cranial nerve deficit.   Psychiatric:         Mood and Affect: Mood normal.           ECOG Performance Status: (foot note - ECOG PS provided by Eastern Cooperative Oncology Group) 0 - Asymptomatic    Karnofsky Performance Score:  100%- Normal, No  Complaints, No Evidence of Disease    Labs:   Lab Results   Component Value Date    WBC 4.82 03/07/2022    HGB 12.9 (L) 03/07/2022    HCT 38.6 (L) 03/07/2022    MCV 99 (H) 03/07/2022     03/07/2022       Lab Results   Component Value Date     03/07/2022    K 3.6 03/07/2022     03/07/2022    CO2 28 03/07/2022    BUN 15 03/07/2022    CREATININE 1.11 03/07/2022    ALBUMIN 3.9 03/07/2022    BILITOT 0.9 03/07/2022    ALKPHOS 44 03/07/2022    AST 50 (H) 03/07/2022    ALT 32 03/07/2022          Assessment and Plan:   Romeo Couch Jr. (Romeo) is a pleasant 52 y.o.male with history of CML.    1. CML  --Follows with Dr. Catalina Rucker, Diagnosed in 2019  --Began Imatinib 400mg daily on Sat May 11th, with a goal of of less that 10% BCR-ABL at 6 months  --Increased Imatinib to 600mg daily in November with a decrease in BCR-ABL from 60% to 28% to 19% to 10% to 0.3% and now 0.2%  --Symptom management with calcium supplements and tonic water  --Will check labs q 3 months    2. Anemia  --Due to high CML burden, Imatinib  --Relatively stable and near baseline, 12.9 today. Asymptomatic, continue to monitor       Orders Placed:      Orders Placed This Encounter   Procedures    CBC auto differential     Standing Status:   Future     Standing Expiration Date:   5/13/2023    Comprehensive Metabolic Panel     Standing Status:   Future     Standing Expiration Date:   5/13/2023    BCR/ABL, p210, Quant, Monitor, blood     Standing Status:   Future     Standing Expiration Date:   5/13/2023     Order Specific Question:   Specimen Source     Answer:   Blood     .      Follow Up:      he will return to clinic in 3 months with labs 1 week earlier, but knows to call in the interim if symptoms change or should a problem arise.    BMT Chart Routing      Follow up with physician 3 months. Follow up with Dr. Rucker or RAN in 3 months   Follow up with RAN    Labs CBC, CMP and BCR/ABL   Lab interval:  Please schedule CBC, CMP,  BCR/ABL at [facility] 1 week before MD/RAN appt    Imaging    Pharmacy appointment    Other referrals             Thank you for allowing me to partake in the care of this patient.       Viri Mac PA-C  Hematology, Oncology, and Stem Cell Transplantation  [facility]

## 2022-03-16 DIAGNOSIS — Z12.11 COLON CANCER SCREENING: ICD-10-CM

## 2022-03-18 ENCOUNTER — PATIENT MESSAGE (OUTPATIENT)
Dept: ADMINISTRATIVE | Facility: HOSPITAL | Age: 52
End: 2022-03-18
Payer: COMMERCIAL

## 2022-03-21 ENCOUNTER — OFFICE VISIT (OUTPATIENT)
Dept: FAMILY MEDICINE | Facility: CLINIC | Age: 52
End: 2022-03-21
Payer: COMMERCIAL

## 2022-03-21 ENCOUNTER — SPECIALTY PHARMACY (OUTPATIENT)
Dept: PHARMACY | Facility: CLINIC | Age: 52
End: 2022-03-21
Payer: COMMERCIAL

## 2022-03-21 ENCOUNTER — PATIENT MESSAGE (OUTPATIENT)
Dept: PHARMACY | Facility: CLINIC | Age: 52
End: 2022-03-21
Payer: COMMERCIAL

## 2022-03-21 ENCOUNTER — PATIENT MESSAGE (OUTPATIENT)
Dept: ADMINISTRATIVE | Facility: HOSPITAL | Age: 52
End: 2022-03-21
Payer: COMMERCIAL

## 2022-03-21 VITALS
BODY MASS INDEX: 30.7 KG/M2 | OXYGEN SATURATION: 95 % | HEART RATE: 64 BPM | HEIGHT: 69 IN | DIASTOLIC BLOOD PRESSURE: 72 MMHG | WEIGHT: 207.31 LBS | SYSTOLIC BLOOD PRESSURE: 138 MMHG

## 2022-03-21 DIAGNOSIS — S61.210D LACERATION OF RIGHT INDEX FINGER WITHOUT FOREIGN BODY WITHOUT DAMAGE TO NAIL, SUBSEQUENT ENCOUNTER: Primary | ICD-10-CM

## 2022-03-21 PROCEDURE — 1159F MED LIST DOCD IN RCRD: CPT | Mod: CPTII,S$GLB,, | Performed by: FAMILY MEDICINE

## 2022-03-21 PROCEDURE — 3078F DIAST BP <80 MM HG: CPT | Mod: CPTII,S$GLB,, | Performed by: FAMILY MEDICINE

## 2022-03-21 PROCEDURE — 99214 OFFICE O/P EST MOD 30 MIN: CPT | Mod: S$GLB,,, | Performed by: FAMILY MEDICINE

## 2022-03-21 PROCEDURE — 1159F PR MEDICATION LIST DOCUMENTED IN MEDICAL RECORD: ICD-10-PCS | Mod: CPTII,S$GLB,, | Performed by: FAMILY MEDICINE

## 2022-03-21 PROCEDURE — 3008F BODY MASS INDEX DOCD: CPT | Mod: CPTII,S$GLB,, | Performed by: FAMILY MEDICINE

## 2022-03-21 PROCEDURE — 3075F PR MOST RECENT SYSTOLIC BLOOD PRESS GE 130-139MM HG: ICD-10-PCS | Mod: CPTII,S$GLB,, | Performed by: FAMILY MEDICINE

## 2022-03-21 PROCEDURE — 99999 PR PBB SHADOW E&M-EST. PATIENT-LVL IV: CPT | Mod: PBBFAC,,, | Performed by: FAMILY MEDICINE

## 2022-03-21 PROCEDURE — 3078F PR MOST RECENT DIASTOLIC BLOOD PRESSURE < 80 MM HG: ICD-10-PCS | Mod: CPTII,S$GLB,, | Performed by: FAMILY MEDICINE

## 2022-03-21 PROCEDURE — 99214 PR OFFICE/OUTPT VISIT, EST, LEVL IV, 30-39 MIN: ICD-10-PCS | Mod: S$GLB,,, | Performed by: FAMILY MEDICINE

## 2022-03-21 PROCEDURE — 3075F SYST BP GE 130 - 139MM HG: CPT | Mod: CPTII,S$GLB,, | Performed by: FAMILY MEDICINE

## 2022-03-21 PROCEDURE — 99999 PR PBB SHADOW E&M-EST. PATIENT-LVL IV: ICD-10-PCS | Mod: PBBFAC,,, | Performed by: FAMILY MEDICINE

## 2022-03-21 PROCEDURE — 3008F PR BODY MASS INDEX (BMI) DOCUMENTED: ICD-10-PCS | Mod: CPTII,S$GLB,, | Performed by: FAMILY MEDICINE

## 2022-03-21 NOTE — TELEPHONE ENCOUNTER
Specialty Pharmacy - Refill Coordination    Specialty Medication Orders Linked to Encounter    Flowsheet Row Most Recent Value   Medication #1 imatinib (GLEEVEC) 100 MG Tab (Order#[id number], Rx#[id number])          Refill Questions - Documented Responses    Flowsheet Row Most Recent Value   Patient Availability and HIPAA Verification    Does patient want to proceed with activity? Yes   HIPAA/medical authority confirmed? Yes   Relationship to patient of person spoken to? Self   Refill Screening Questions    Changes to allergies? No   Changes to medications? No   New conditions since last clinic visit? No   Unplanned office visit, urgent care, ED, or hospital admission in the last 4 weeks? Yes  [cut on finger- resolve no issue.]   How does patient/caregiver feel medication is working? Good   Financial problems or insurance changes? No   How many doses of your specialty medications were missed in the last 4 weeks? 0   Would patient like to speak to a pharmacist? No   When does the patient need to receive the medication? 03/24/22   Refill Delivery Questions    How will the patient receive the medication? Delivery [facility]   When does the patient need to receive the medication? 03/24/22   Shipping Address Home   Address in [facility] confirmed and updated if neccessary? Yes   Expected Copay ($) 250   Is the patient able to afford the medication copay? Yes   Payment Method CC on file   Days supply of Refill 30   Supplies needed? No supplies needed   Refill activity completed? Yes   Refill activity plan Refill scheduled   Shipment/Pickup Date: 03/22/22          Current Outpatient Medications   Medication Sig    fluticasone propionate (FLONASE) 50 mcg/actuation nasal spray 2 sprays by Each Nostril route once daily.    imatinib (GLEEVEC) 100 MG Tab Take 6 tablets (600 mg total) by mouth once daily.   Last reviewed on 3/21/2022  7:19 AM by Felipa Bustamante RN    Review of patient's allergies indicates:  No Known  Allergies Last reviewed on  3/21/2022 7:19 AM by Felipa Bustamante      Tasks added this encounter   4/16/2022 - Refill Call (Auto Added)   Tasks due within next 3 months   4/14/2022 - Clinical - Follow Up Assesement (90 day)     Tim Landeros, PharmD  [facility]  [address]  [facility] [address]  Phone: [phone]  Fax: [phone]

## 2022-03-21 NOTE — PROGRESS NOTES
[facility] Note    Chief Complaint      Chief Complaint   Patient presents with    Follow-up     Suture removal     History of Present Illness     Romeo Couch Jr. is a 52 y.o. male who presents today with:    Patient was seen at  11 days ago and had 4 sutures placed in right 1st finger.  Healing well.      Laceration   The incident occurred more than 1 week ago. The laceration is located on the right hand. The laceration is 1 cm in size. The laceration mechanism was a blunt object. The pain has been improving since onset.       Problem List Items Addressed This Visit    None     Visit Diagnoses     Laceration of right index finger without foreign body without damage to nail, subsequent encounter    -  Primary          Health Maintenance   Topic Date Due    Lipid Panel  Never done    TETANUS VACCINE  Never done    Hepatitis C Screening  Completed       Past Medical History:   Diagnosis Date    Leukocytosis     Pneumothorax     29 yo       Past Surgical History:   Procedure Laterality Date    BONE MARROW BIOPSY Left 4/25/2019    Procedure: Biopsy-bone marrow;  Surgeon: Catalina Rucker MD;  Location: [facility] OR [address] [place] [address];  Service: Orthopedics;  Laterality: Left;    right shoulder surgery      right rotator cuff       family history includes Cancer in his mother.     Social History     Tobacco Use    Smoking status: Never Smoker    Smokeless tobacco: Current User     Types: Snuff   Substance Use Topics    Alcohol use: Yes     Comment: occasionally on wkends    Drug use: No       Review of Systems   Constitutional: Negative for chills, fever, malaise/fatigue and weight loss.   HENT: Negative for congestion, ear discharge and ear pain.    Eyes: Negative for blurred vision.   Respiratory: Negative for cough and shortness of breath.    Cardiovascular: Negative for chest pain and leg swelling.   Gastrointestinal: Negative for abdominal pain, constipation, diarrhea and vomiting.  "  Genitourinary: Negative for dysuria.   Musculoskeletal: Negative for myalgias.   Skin: Negative for rash.   Neurological: Negative for dizziness, tingling, focal weakness, weakness and headaches.   Endo/Heme/Allergies: Does not bruise/bleed easily.   Psychiatric/Behavioral: Negative for depression and suicidal ideas.        Outpatient Encounter Medications as of 3/21/2022   Medication Sig Note Dispense Refill    fluticasone propionate (FLONASE) 50 mcg/actuation nasal spray 2 sprays by Each Nostril route once daily. 11/19/2020: PRN      imatinib (GLEEVEC) 100 MG Tab Take 6 tablets (600 mg total) by mouth once daily.  180 tablet 11     Facility-Administered Encounter Medications as of 3/21/2022   Medication Dose Route Frequency Provider Last Rate Last Admin    acetaminophen tablet 650 mg  650 mg Oral Once PRN Sharif Jain MD        albuterol inhaler 2 puff  2 puff Inhalation Q20 Min PRN Sharif Jain MD        diphenhydrAMINE injection 25 mg  25 mg Intravenous Once PRN Sharif Jain MD        EPINEPHrine (EPIPEN) 0.3 mg/0.3 mL pen injection 0.3 mg  0.3 mg Intramuscular PRN Sharif Jain MD        methylPREDNISolone sodium succinate injection 40 mg  40 mg Intravenous Once PRN Sharif Jain MD        ondansetron disintegrating tablet 4 mg  4 mg Oral Once PRN Sharif Jain MD        sodium chloride 0.9% 500 mL flush bag   Intravenous PRN Sharif Jain MD        sodium chloride 0.9% flush 10 mL  10 mL Intravenous PRN Sharif Jain MD           Review of patient's allergies indicates:  No Known Allergies    Physical Exam      Vital Signs  Pulse: 64  SpO2: 95 %  BP: 138/72  Pain Score: 0-No pain  Height and Weight  Height: 5' 9" (175.3 cm)  Weight: 94 kg (207 lb 5.5 oz)  BSA (Calculated - sq m): 2.14 sq meters  BMI (Calculated): 30.6  Weight in (lb) to have BMI = 25: 168.9]    Physical Exam  Vitals reviewed.   Constitutional:       General: He is not in acute distress.    "  Appearance: Normal appearance. He is normal weight. He is not ill-appearing.   HENT:      Head: Normocephalic.      Nose: Nose normal.      Mouth/Throat:      Mouth: Mucous membranes are moist.      Pharynx: Oropharynx is clear.   Eyes:      Extraocular Movements: Extraocular movements intact.      Conjunctiva/sclera: Conjunctivae normal.   Pulmonary:      Effort: Pulmonary effort is normal.      Breath sounds: Normal breath sounds.   Musculoskeletal:         General: Normal range of motion.        Hands:       Cervical back: Normal range of motion and neck supple.      Comments: Healed lac with 4 sutures in place on 1st right digit.  Normal rom.  No signs of infection.     Skin:     General: Skin is warm and dry.      Findings: No rash.   Neurological:      General: No focal deficit present.      Mental Status: He is alert and oriented to person, place, and time.      Motor: No weakness.      Gait: Gait normal.   Psychiatric:         Mood and Affect: Mood normal.         Behavior: Behavior normal.         Thought Content: Thought content normal.         Judgment: Judgment normal.          Laboratory:  CBC:  Recent Labs   Lab Result Units 01/31/22  1513 03/07/22  1117   WBC K/uL 6.76 4.82   RBC M/uL 4.15* 3.89*   Hemoglobin g/dL 13.9* 12.9*   Hematocrit % 41.2 38.6*   Platelets K/uL 231 179   MCV fL 99* 99*   MCH pg 33.5* 33.2*   MCHC g/dL 33.7 33.4     CMP:  Recent Labs   Lab Result Units 01/31/22  1513 03/07/22  1116   Glucose mg/dL 110 125*   Calcium mg/dL 9.3 8.5*   Albumin g/dL 4.4 3.9   Total Protein g/dL 6.9 6.4   Sodium mmol/L 143 141   Potassium mmol/L 4.5 3.6   CO2 mmol/L 32* 28   Chloride mmol/L 99 107   BUN mg/dL 11 15   Alkaline Phosphatase U/L 43 44   ALT U/L 30 32   AST U/L 38 50*   Total Bilirubin mg/dL 0.7 0.9     URINALYSIS:  Recent Labs   Lab Result Units 01/31/22  1513   Color, UA  Yellow   Specific Gravity, UA  1.020   pH, UA  6.0   Protein, UA  Trace*   Nitrite, UA  Negative   Leukocytes, UA   Negative   Urobilinogen, UA EU/dL Negative      LIPIDS:  No results for input(s): TSH, HDL, CHOL, TRIG, LDLCALC, CHOLHDL, NONHDLCHOL, TOTALCHOLEST in the last 2160 hours.  TSH:  No results for input(s): TSH in the last 2160 hours.  A1C:  No results for input(s): HGBA1C in the last 2160 hours.    Procedure: 4 sutures removed without complication.  Wound wrapped.  Normal rom.  Pt tolerated      Assessment/Plan     Romeo Couch Jr. is a 52 y.o.male with:    1. Laceration of right index finger without foreign body without damage to nail, subsequent encounter  Chronic conditions stable.  Will continue to monitor.     Follow up as discussed    If symptoms worsen or do not improve return to clinic, go to Urgent Care or ER  Take Medications as directed      -Continue current medications and maintain follow up with specialists.         Donald W Fabacher Jr, MD [facility]

## 2022-04-18 ENCOUNTER — PATIENT MESSAGE (OUTPATIENT)
Dept: ADMINISTRATIVE | Facility: OTHER | Age: 52
End: 2022-04-18
Payer: COMMERCIAL

## 2022-04-18 ENCOUNTER — PATIENT MESSAGE (OUTPATIENT)
Dept: PHARMACY | Facility: CLINIC | Age: 52
End: 2022-04-18
Payer: COMMERCIAL

## 2022-04-20 ENCOUNTER — SPECIALTY PHARMACY (OUTPATIENT)
Dept: PHARMACY | Facility: CLINIC | Age: 52
End: 2022-04-20
Payer: COMMERCIAL

## 2022-04-20 NOTE — TELEPHONE ENCOUNTER
Romeo Couch Jr. is a 52 y.o. male, who is followed by the specialty pharmacy service for management and education of his Gleevec.  He has been on therapy with Gleevec for four years.  I have reviewed his electronic medical record and current medication list and determined that specialty medication adjustment Is not needed at this time.    Patient has not experienced adverse events.  He Is adherent reporting 0  missed doses since last review.  He is on target to meet goals of therapy and will continue treatment.    Follow Up Review 3/21/2022 2/18/2022 1/21/2022   # of missed doses 0 0 0   New Medications? No No No   New Conditions? No No No   New Allergies? No No No   Medication Effective? Good Good Good   Missed activities? - - No   Urgent Care? - - None   Some recent data might be hidden       Therapy is appropriate to continue.    Therapy is effective: Yes  Recommendations: none at this time.  Review Method: Patient Contact    Bethany Candelario, PharmD  [facility]  [address]  [facility] [address]  Phone: [phone]  Fax: [phone]

## 2022-05-17 ENCOUNTER — PATIENT MESSAGE (OUTPATIENT)
Dept: PHARMACY | Facility: CLINIC | Age: 52
End: 2022-05-17
Payer: COMMERCIAL

## 2022-05-20 ENCOUNTER — SPECIALTY PHARMACY (OUTPATIENT)
Dept: PHARMACY | Facility: CLINIC | Age: 52
End: 2022-05-20
Payer: COMMERCIAL

## 2022-05-20 ENCOUNTER — PATIENT MESSAGE (OUTPATIENT)
Dept: PHARMACY | Facility: CLINIC | Age: 52
End: 2022-05-20
Payer: COMMERCIAL

## 2022-05-20 NOTE — TELEPHONE ENCOUNTER
Specialty Pharmacy - Refill Coordination    Specialty Medication Orders Linked to Encounter    Flowsheet Row Most Recent Value   Medication #1 imatinib (GLEEVEC) 100 MG Tab (Order#[id number], Rx#[id number])          Refill Questions - Documented Responses    Flowsheet Row Most Recent Value   Patient Availability and HIPAA Verification    Does patient want to proceed with activity? Yes   HIPAA/medical authority confirmed? Yes   Relationship to patient of person spoken to? Self   Refill Screening Questions    Changes to allergies? No   Changes to medications? No   New conditions since last clinic visit? No   Unplanned office visit, urgent care, ED, or hospital admission in the last 4 weeks? No   How does patient/caregiver feel medication is working? Good   Financial problems or insurance changes? No   How many doses of your specialty medications were missed in the last 4 weeks? 0   Would patient like to speak to a pharmacist? No   When does the patient need to receive the medication? 05/25/22   Refill Delivery Questions    How will the patient receive the medication? Delivery Candis   When does the patient need to receive the medication? 05/25/22   Shipping Address Home   Address in [facility] confirmed and updated if neccessary? Yes   Expected Copay ($) 250   Is the patient able to afford the medication copay? Yes   Payment Method CC on file   Days supply of Refill 30   Supplies needed? No supplies needed   Refill activity completed? Yes   Refill activity plan Refill scheduled   Shipment/Pickup Date: 05/24/22          Current Outpatient Medications   Medication Sig    fluticasone propionate (FLONASE) 50 mcg/actuation nasal spray 2 sprays by Each Nostril route once daily.    imatinib (GLEEVEC) 100 MG Tab Take 6 tablets (600 mg total) by mouth once daily.   Last reviewed on 3/21/2022  7:19 AM by Felipa Bustamante RN    Review of patient's allergies indicates:  No Known Allergies Last reviewed on  3/21/2022 7:19  AM by Felipa Bustamante      Tasks added this encounter   6/17/2022 - Refill Call (Auto Added)   Tasks due within next 3 months   No tasks due.     Lashay Kwong, PharmD  [facility]  [address] [place] [address]  Phone: [phone]  Fax: [phone]

## 2022-05-31 ENCOUNTER — PATIENT MESSAGE (OUTPATIENT)
Dept: ADMINISTRATIVE | Facility: HOSPITAL | Age: 52
End: 2022-05-31
Payer: COMMERCIAL

## 2022-06-14 ENCOUNTER — PATIENT MESSAGE (OUTPATIENT)
Dept: HEMATOLOGY/ONCOLOGY | Facility: CLINIC | Age: 52
End: 2022-06-14
Payer: COMMERCIAL

## 2022-06-17 DIAGNOSIS — C92.10 CML (CHRONIC MYELOCYTIC LEUKEMIA): ICD-10-CM

## 2022-06-17 RX ORDER — IMATINIB MESYLATE 100 MG/1
600 TABLET, FILM COATED ORAL DAILY
Qty: 180 TABLET | Refills: 11 | Status: SHIPPED | OUTPATIENT
Start: 2022-06-17 | End: 2023-06-09 | Stop reason: SDUPTHER

## 2022-06-20 ENCOUNTER — SPECIALTY PHARMACY (OUTPATIENT)
Dept: PHARMACY | Facility: CLINIC | Age: 52
End: 2022-06-20
Payer: COMMERCIAL

## 2022-06-20 NOTE — TELEPHONE ENCOUNTER
Specialty Pharmacy - Refill Coordination    Specialty Medication Orders Linked to Encounter    Flowsheet Row Most Recent Value   Medication #1 imatinib (GLEEVEC) 100 MG Tab (Order#[id number], Rx#[id number])          Refill Questions - Documented Responses    Flowsheet Row Most Recent Value   Patient Availability and HIPAA Verification    Does patient want to proceed with activity? Yes   HIPAA/medical authority confirmed? Yes   Relationship to patient of person spoken to? Self   Refill Screening Questions    Changes to allergies? No   Changes to medications? No   New conditions since last clinic visit? No   Unplanned office visit, urgent care, ED, or hospital admission in the last 4 weeks? No   How does patient/caregiver feel medication is working? Good   Financial problems or insurance changes? No   How many doses of your specialty medications were missed in the last 4 weeks? 0   Would patient like to speak to a pharmacist? No   When does the patient need to receive the medication? 06/25/22   Refill Delivery Questions    How will the patient receive the medication? Delivery Candis   When does the patient need to receive the medication? 06/25/22   Shipping Address Home   Address in [facility] confirmed and updated if neccessary? Yes   Expected Copay ($) 250   Is the patient able to afford the medication copay? Yes   Payment Method CC on file   Days supply of Refill 30   Supplies needed? No supplies needed   Refill activity completed? Yes   Refill activity plan Refill scheduled   Shipment/Pickup Date: 06/21/22          Current Outpatient Medications   Medication Sig    fluticasone propionate (FLONASE) 50 mcg/actuation nasal spray 2 sprays by Each Nostril route once daily.    imatinib (GLEEVEC) 100 MG Tab Take 6 tablets (600 mg total) by mouth once daily.   Last reviewed on 3/21/2022  7:19 AM by Felipa Bustamante RN    Review of patient's allergies indicates:  No Known Allergies Last reviewed on  6/17/2022 2:17  PM by Beatrice Luque      Tasks added this encounter   7/18/2022 - Refill Call (Auto Added)   Tasks due within next 3 months   No tasks due.     Makeda Foss, Patient Care Assistant  [facility]  [address]  [facility] [address]  Phone: [phone]  Fax: [phone]

## 2022-06-27 ENCOUNTER — OFFICE VISIT (OUTPATIENT)
Dept: HEMATOLOGY/ONCOLOGY | Facility: CLINIC | Age: 52
End: 2022-06-27
Payer: COMMERCIAL

## 2022-06-27 VITALS
BODY MASS INDEX: 31.26 KG/M2 | HEIGHT: 69 IN | SYSTOLIC BLOOD PRESSURE: 144 MMHG | OXYGEN SATURATION: 96 % | DIASTOLIC BLOOD PRESSURE: 71 MMHG | HEART RATE: 84 BPM | TEMPERATURE: 98 F | WEIGHT: 211.06 LBS | RESPIRATION RATE: 16 BRPM

## 2022-06-27 DIAGNOSIS — C92.10 CML (CHRONIC MYELOCYTIC LEUKEMIA): Primary | ICD-10-CM

## 2022-06-27 DIAGNOSIS — R16.1 SPLENOMEGALY: ICD-10-CM

## 2022-06-27 DIAGNOSIS — R03.0 ELEVATED BLOOD PRESSURE READING: ICD-10-CM

## 2022-06-27 PROCEDURE — 99215 OFFICE O/P EST HI 40 MIN: CPT | Mod: S$GLB,,, | Performed by: INTERNAL MEDICINE

## 2022-06-27 PROCEDURE — 3077F SYST BP >= 140 MM HG: CPT | Mod: CPTII,S$GLB,, | Performed by: INTERNAL MEDICINE

## 2022-06-27 PROCEDURE — 3078F DIAST BP <80 MM HG: CPT | Mod: CPTII,S$GLB,, | Performed by: INTERNAL MEDICINE

## 2022-06-27 PROCEDURE — 3008F BODY MASS INDEX DOCD: CPT | Mod: CPTII,S$GLB,, | Performed by: INTERNAL MEDICINE

## 2022-06-27 PROCEDURE — 99999 PR PBB SHADOW E&M-EST. PATIENT-LVL III: ICD-10-PCS | Mod: PBBFAC,,, | Performed by: INTERNAL MEDICINE

## 2022-06-27 PROCEDURE — 1159F MED LIST DOCD IN RCRD: CPT | Mod: CPTII,S$GLB,, | Performed by: INTERNAL MEDICINE

## 2022-06-27 PROCEDURE — 3077F PR MOST RECENT SYSTOLIC BLOOD PRESSURE >= 140 MM HG: ICD-10-PCS | Mod: CPTII,S$GLB,, | Performed by: INTERNAL MEDICINE

## 2022-06-27 PROCEDURE — 99215 PR OFFICE/OUTPT VISIT, EST, LEVL V, 40-54 MIN: ICD-10-PCS | Mod: S$GLB,,, | Performed by: INTERNAL MEDICINE

## 2022-06-27 PROCEDURE — 3078F PR MOST RECENT DIASTOLIC BLOOD PRESSURE < 80 MM HG: ICD-10-PCS | Mod: CPTII,S$GLB,, | Performed by: INTERNAL MEDICINE

## 2022-06-27 PROCEDURE — 99999 PR PBB SHADOW E&M-EST. PATIENT-LVL III: CPT | Mod: PBBFAC,,, | Performed by: INTERNAL MEDICINE

## 2022-06-27 PROCEDURE — 3008F PR BODY MASS INDEX (BMI) DOCUMENTED: ICD-10-PCS | Mod: CPTII,S$GLB,, | Performed by: INTERNAL MEDICINE

## 2022-06-27 PROCEDURE — 1159F PR MEDICATION LIST DOCUMENTED IN MEDICAL RECORD: ICD-10-PCS | Mod: CPTII,S$GLB,, | Performed by: INTERNAL MEDICINE

## 2022-06-27 NOTE — PROGRESS NOTES
Section of Hematology and Stem Cell Transplantation  Follow Up Note     Visit Date: 06/27/2022    Primary Oncologic Diagnosis: CML    History of Present Ilness:   Romeo Couch Jr. (Romeo) is a pleasant 52 y.o.male with history of CML diagnosed in 2019, on Imatinib therapy.     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 He endorses ongoing compliance with his Imatinib and is feeling well today with a stable WBC. Tolerating TKI w/o any side effects. He denies any fevers, chills, night sweats, weight loss, early satiety, recurrent infections, lymphadenopathy. No CP/SOB/abdominal pain/N/V/D. Overall feel well without complaints. Pleased to her WBC stable at that BCR-ABL 0.3%.     Just got back from [facility] with extended family.    Oncology History Summary:   --Presented to [facility] ED on 4/23/19. He reported that he went to his PCP with left abdominal pain and temp of 100.4 the day before and was sent by PCP for CT. CT showed splenomegaly. Pt was instructed to go to ED. Went to [facility] and was found to have leukocytosis. Wanted to transfer patient to [facility], but no beds were available. Patient reportedly left [facility] ED AMA and came to [facility] ED over night. WBC 238K. Suspicious for acute leukemia. He denies any aches, chills, n/v/d, constipation, chest pain, bleeding or bruising. C/o SOB on exertion and mild, non-productive cough. Patient states that he has lost 8 lbs in the last few weeks.  --Peripheral smear was reviewed in the ED which was highly suggestive of CML  --Bone marrow biopsy on 4/25/19: CHRONIC MYELOID LEUKEMIA, BCR-ABL1-POSITIVE, CHRONIC PHASE. FOCAL GRADE 2 RETICULAR FIBROSIS  --BCR-ABL P210 80%  --Began imatinib on Saturday May 11, 2019      Past Medical History, Social History, and Past Family History are unchanged since last evaluation except for HPI.     CURRENT MEDICATIONS:   Current Outpatient Medications   Medication Sig     fluticasone propionate (FLONASE) 50 mcg/actuation nasal spray 2 sprays by Each Nostril route once daily.    imatinib (GLEEVEC) 100 MG Tab Take 6 tablets (600 mg total) by mouth once daily.     Current Facility-Administered Medications   Medication    acetaminophen tablet 650 mg    albuterol inhaler 2 puff    diphenhydrAMINE injection 25 mg    EPINEPHrine (EPIPEN) 0.3 mg/0.3 mL pen injection 0.3 mg    methylPREDNISolone sodium succinate injection 40 mg    ondansetron disintegrating tablet 4 mg    sodium chloride 0.9% 500 mL flush bag    sodium chloride 0.9% flush 10 mL       ALLERGIES:   Review of patient's allergies indicates:  No Known Allergies      Review of Systems:     As noted in interval history, otherwise negative.    Physical Exam:     Vitals:    06/27/22 1355   BP: (!) 144/71   Pulse: 84   Resp: 16   Temp: 98.1 °F (36.7 °C)       Physical Exam  Constitutional:       General: He is not in acute distress.     Appearance: Normal appearance.   HENT:      Head: Normocephalic.      Right Ear: External ear normal.      Left Ear: External ear normal.      Nose: Nose normal.   Eyes:      Extraocular Movements: Extraocular movements intact.      Pupils: Pupils are equal, round, and reactive to light.   Cardiovascular:      Rate and Rhythm: Normal rate and regular rhythm.   Pulmonary:      Effort: Pulmonary effort is normal.      Breath sounds: Normal breath sounds.   Abdominal:      General: Abdomen is flat. Bowel sounds are normal.   Musculoskeletal:         General: No swelling.      Cervical back: Neck supple.   Skin:     General: Skin is warm.   Neurological:      General: No focal deficit present.      Mental Status: He is alert and oriented to person, place, and time.      Cranial Nerves: No cranial nerve deficit.   Psychiatric:         Mood and Affect: Mood normal.           ECOG Performance Status: (foot note - ECOG PS provided by Eastern Cooperative Oncology Group) 0 - Asymptomatic    Karnofsky  Performance Score:  100%- Normal, No Complaints, No Evidence of Disease    Labs:   Lab Results   Component Value Date    WBC 5.35 06/20/2022    HGB 13.4 (L) 06/20/2022    HCT 38.9 (L) 06/20/2022    MCV 98 06/20/2022     06/20/2022       Lab Results   Component Value Date     06/20/2022    K 4.2 06/20/2022     06/20/2022    CO2 27 06/20/2022    BUN 18 06/20/2022    CREATININE 0.97 06/20/2022    ALBUMIN 4.1 06/20/2022    BILITOT 0.5 06/20/2022    ALKPHOS 53 06/20/2022    AST 55 (H) 06/20/2022    ALT 29 06/20/2022          Assessment and Plan:   Romeo Couch Jr. (Romeo) is a pleasant 52 y.o.male with history of CML.    1. CML  --Follows with Dr. Catalina Rucker, Diagnosed in 2019  --Began Imatinib 400mg daily on Sat May 11th, with a goal of of less that 10% BCR-ABL at 6 months  --Increased Imatinib to 600mg daily in November with a decrease in BCR-ABL from 60% to 28% to 19% to 10% to 0.2% and now 0.3%  --Symptom management with calcium supplements and tonic water  --Will check labs q 3 months    2. Anemia  --Due to high CML burden, Imatinib  --Relatively stable and near baseline, 12.9 today. Asymptomatic, continue to monitor         Follow Up:      he will return to clinic in 3 months with labs 1 week earlier, but knows to call in the interim if symptoms change or should a problem arise.    BMT Chart Routing      Follow up with physician 3 months. Follow up with Dr. Rucker in 3 months   Follow up with RAN    Infusion scheduling note    Injection scheduling note    Labs CBC, CMP and BCR/ABL   Lab interval:  Please schedule CBC, CMP, BCR/ABL at [place] 1 week before MD/RAN appt    Imaging    Pharmacy appointment    Other referrals             Catalina Rucker MD  Bone Marrow Transplant Physician

## 2022-07-18 ENCOUNTER — PATIENT MESSAGE (OUTPATIENT)
Dept: PHARMACY | Facility: CLINIC | Age: 52
End: 2022-07-18
Payer: COMMERCIAL

## 2022-07-20 ENCOUNTER — SPECIALTY PHARMACY (OUTPATIENT)
Dept: PHARMACY | Facility: CLINIC | Age: 52
End: 2022-07-20
Payer: COMMERCIAL

## 2022-07-20 NOTE — TELEPHONE ENCOUNTER
Specialty Pharmacy - Refill Coordination    Specialty Medication Orders Linked to Encounter    Flowsheet Row Most Recent Value   Medication #1 imatinib (GLEEVEC) 100 MG Tab (Order#[id number], Rx#[id number])          Refill Questions - Documented Responses    Flowsheet Row Most Recent Value   Patient Availability and HIPAA Verification    Does patient want to proceed with activity? Yes   HIPAA/medical authority confirmed? Yes   Relationship to patient of person spoken to? Self   Refill Screening Questions    Changes to allergies? No   Changes to medications? No   New conditions since last clinic visit? No   Unplanned office visit, urgent care, ED, or hospital admission in the last 4 weeks? No   How does patient/caregiver feel medication is working? Good   Financial problems or insurance changes? No   How many doses of your specialty medications were missed in the last 4 weeks? 0   Would patient like to speak to a pharmacist? No   When does the patient need to receive the medication? 07/22/22   Refill Delivery Questions    How will the patient receive the medication? Delivery Candis   When does the patient need to receive the medication? 07/22/22   Shipping Address Home   Address in [facility] confirmed and updated if neccessary? Yes   Expected Copay ($) 250   Is the patient able to afford the medication copay? Yes   Payment Method CC on file   Days supply of Refill 30   Supplies needed? No supplies needed   Refill activity completed? Yes   Refill activity plan Refill scheduled   Shipment/Pickup Date: 07/20/22          Current Outpatient Medications   Medication Sig    fluticasone propionate (FLONASE) 50 mcg/actuation nasal spray 2 sprays by Each Nostril route once daily.    imatinib (GLEEVEC) 100 MG Tab Take 6 tablets (600 mg total) by mouth once daily.   Last reviewed on 6/27/2022  1:56 PM by Clarice Cohen MA    Review of patient's allergies indicates:  No Known Allergies Last reviewed on  6/27/2022 1:56 PM  by Clarice Cohen      Tasks added this encounter   No tasks added.   Tasks due within next 3 months   10/10/2022 - Clinical - Follow Up Assesement (180 day)  7/18/2022 - Refill Call (Auto Added)     MATTIE MENDOZA, PharmD  [facility]  [address]  [facility] [address]  Phone: [phone]  Fax: [phone]

## 2022-08-15 NOTE — TELEPHONE ENCOUNTER
Specialty Pharmacy - Refill Coordination  Specialty Pharmacy - Clinical Reassessment    Specialty Medication Orders Linked to Encounter    Flowsheet Row Most Recent Value   Medication #1 imatinib (GLEEVEC) 100 MG Tab (Order#[id number], Rx#[id number])          Refill Questions - Documented Responses    Flowsheet Row Most Recent Value   Patient Availability and HIPAA Verification    Does patient want to proceed with activity? Yes   HIPAA/medical authority confirmed? Yes   Relationship to patient of person spoken to? Self   Refill Screening Questions    Changes to allergies? No   Changes to medications? No   New conditions since last clinic visit? No   Unplanned office visit, urgent care, ED, or hospital admission in the last 4 weeks? No   How does patient/caregiver feel medication is working? Good   Financial problems or insurance changes? No   How many doses of your specialty medications were missed in the last 4 weeks? 0   Would patient like to speak to a pharmacist? No   When does the patient need to receive the medication? 04/24/22   Refill Delivery Questions    How will the patient receive the medication? Delivery Candis   When does the patient need to receive the medication? 04/24/22   Shipping Address Home   Address in [facility] confirmed and updated if neccessary? Yes   Expected Copay ($) 250   Is the patient able to afford the medication copay? Yes   Payment Method CC on file   Days supply of Refill 30   Supplies needed? No supplies needed   Refill activity completed? Yes   Refill activity plan Refill scheduled   Shipment/Pickup Date: 04/21/22          Current Outpatient Medications   Medication Sig    fluticasone propionate (FLONASE) 50 mcg/actuation nasal spray 2 sprays by Each Nostril route once daily.    imatinib (GLEEVEC) 100 MG Tab Take 6 tablets (600 mg total) by mouth once daily.   Last reviewed on 6/27/2022  1:56 PM by Clarice Cohen MA    Review of patient's allergies indicates:  No Known  Allergies Last reviewed on  6/27/2022 1:56 PM by Clarice Cohen      Tasks added this encounter   10/10/2022 - Clinical - Follow Up Assesement (180 day)   Tasks due within next 3 months   8/14/2022 - Refill Call (Auto Added)     Ivette Rico, PharmD  [facility]  [address]  [facility] [address]  Phone: [phone]  Fax: [phone]

## 2022-08-16 ENCOUNTER — SPECIALTY PHARMACY (OUTPATIENT)
Dept: PHARMACY | Facility: CLINIC | Age: 52
End: 2022-08-16
Payer: COMMERCIAL

## 2022-09-13 ENCOUNTER — PATIENT MESSAGE (OUTPATIENT)
Dept: PHARMACY | Facility: CLINIC | Age: 52
End: 2022-09-13
Payer: COMMERCIAL

## 2022-09-16 ENCOUNTER — SPECIALTY PHARMACY (OUTPATIENT)
Dept: PHARMACY | Facility: CLINIC | Age: 52
End: 2022-09-16
Payer: COMMERCIAL

## 2022-09-16 NOTE — TELEPHONE ENCOUNTER
Specialty Pharmacy - Refill Coordination    Specialty Medication Orders Linked to Encounter      Flowsheet Row Most Recent Value   Medication #1 imatinib (GLEEVEC) 100 MG Tab (Order#[id number], Rx#[id number])            Refill Questions - Documented Responses      Flowsheet Row Most Recent Value   Patient Availability and HIPAA Verification    Does patient want to proceed with activity? Yes   HIPAA/medical authority confirmed? Yes   Relationship to patient of person spoken to? Self   Refill Screening Questions    Changes to allergies? No   Changes to medications? No   New conditions since last clinic visit? No   Unplanned office visit, urgent care, ED, or hospital admission in the last 4 weeks? No   How does patient/caregiver feel medication is working? Good   Financial problems or insurance changes? No   How many doses of your specialty medications were missed in the last 4 weeks? 0   Would patient like to speak to a pharmacist? No   When does the patient need to receive the medication? 09/22/22   Refill Delivery Questions    How will the patient receive the medication? Delivery [facility]   When does the patient need to receive the medication? 09/22/22   Shipping Address Home   Address in [facility] confirmed and updated if neccessary? Yes   Expected Copay ($) 250   Is the patient able to afford the medication copay? Yes   Payment Method CC on file   Days supply of Refill 30   Supplies needed? No supplies needed   Refill activity completed? Yes   Refill activity plan Refill scheduled   Shipment/Pickup Date: 09/19/22            Current Outpatient Medications   Medication Sig    fluticasone propionate (FLONASE) 50 mcg/actuation nasal spray 2 sprays by Each Nostril route once daily.    imatinib (GLEEVEC) 100 MG Tab Take 6 tablets (600 mg total) by mouth once daily.   Last reviewed on 6/27/2022  1:56 PM by Clarice Cohen MA    Review of patient's allergies indicates:  No Known Allergies Last reviewed on  6/27/2022  1:56 PM by Clarice Cohen      Tasks added this encounter   10/15/2022 - Refill Call (Auto Added)   Tasks due within next 3 months   10/10/2022 - Clinical - Follow Up Assesement (180 day)     Delvis Vidal, PharmD  [facility]  [address]  [facility] [address]  Phone: [phone]  Fax: [phone]

## 2022-09-30 ENCOUNTER — OFFICE VISIT (OUTPATIENT)
Dept: HEMATOLOGY/ONCOLOGY | Facility: CLINIC | Age: 52
End: 2022-09-30
Payer: COMMERCIAL

## 2022-09-30 VITALS
RESPIRATION RATE: 18 BRPM | HEART RATE: 70 BPM | OXYGEN SATURATION: 100 % | BODY MASS INDEX: 30.44 KG/M2 | TEMPERATURE: 98 F | SYSTOLIC BLOOD PRESSURE: 136 MMHG | WEIGHT: 205.5 LBS | HEIGHT: 69 IN | DIASTOLIC BLOOD PRESSURE: 76 MMHG

## 2022-09-30 DIAGNOSIS — R16.1 SPLENOMEGALY: ICD-10-CM

## 2022-09-30 DIAGNOSIS — D63.0 ANEMIA IN NEOPLASTIC DISEASE: ICD-10-CM

## 2022-09-30 DIAGNOSIS — C92.10 CML (CHRONIC MYELOCYTIC LEUKEMIA): Primary | ICD-10-CM

## 2022-09-30 PROCEDURE — 1159F PR MEDICATION LIST DOCUMENTED IN MEDICAL RECORD: ICD-10-PCS | Mod: CPTII,S$GLB,, | Performed by: INTERNAL MEDICINE

## 2022-09-30 PROCEDURE — 3078F DIAST BP <80 MM HG: CPT | Mod: CPTII,S$GLB,, | Performed by: INTERNAL MEDICINE

## 2022-09-30 PROCEDURE — 3075F SYST BP GE 130 - 139MM HG: CPT | Mod: CPTII,S$GLB,, | Performed by: INTERNAL MEDICINE

## 2022-09-30 PROCEDURE — 1159F MED LIST DOCD IN RCRD: CPT | Mod: CPTII,S$GLB,, | Performed by: INTERNAL MEDICINE

## 2022-09-30 PROCEDURE — 99999 PR PBB SHADOW E&M-EST. PATIENT-LVL IV: ICD-10-PCS | Mod: PBBFAC,,, | Performed by: INTERNAL MEDICINE

## 2022-09-30 PROCEDURE — 3078F PR MOST RECENT DIASTOLIC BLOOD PRESSURE < 80 MM HG: ICD-10-PCS | Mod: CPTII,S$GLB,, | Performed by: INTERNAL MEDICINE

## 2022-09-30 PROCEDURE — 3075F PR MOST RECENT SYSTOLIC BLOOD PRESS GE 130-139MM HG: ICD-10-PCS | Mod: CPTII,S$GLB,, | Performed by: INTERNAL MEDICINE

## 2022-09-30 PROCEDURE — 99215 PR OFFICE/OUTPT VISIT, EST, LEVL V, 40-54 MIN: ICD-10-PCS | Mod: S$GLB,,, | Performed by: INTERNAL MEDICINE

## 2022-09-30 PROCEDURE — 3008F PR BODY MASS INDEX (BMI) DOCUMENTED: ICD-10-PCS | Mod: CPTII,S$GLB,, | Performed by: INTERNAL MEDICINE

## 2022-09-30 PROCEDURE — 99999 PR PBB SHADOW E&M-EST. PATIENT-LVL IV: CPT | Mod: PBBFAC,,, | Performed by: INTERNAL MEDICINE

## 2022-09-30 PROCEDURE — 3008F BODY MASS INDEX DOCD: CPT | Mod: CPTII,S$GLB,, | Performed by: INTERNAL MEDICINE

## 2022-09-30 PROCEDURE — 99215 OFFICE O/P EST HI 40 MIN: CPT | Mod: S$GLB,,, | Performed by: INTERNAL MEDICINE

## 2022-09-30 NOTE — PROGRESS NOTES
Section of Hematology and Stem Cell Transplantation  Follow Up Note     Visit Date: 09/30/2022    Primary Oncologic Diagnosis: CML    History of Present Ilness:   Romeo Couch Jr. (Romeo) is a pleasant 52 y.o.male with history of CML diagnosed in 2019, on Imatinib therapy.     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 He endorses ongoing compliance with his Imatinib and is feeling well today with a stable WBC. Tolerating TKI w/o any side effects. He denies any fevers, chills, night sweats, weight loss, early satiety, recurrent infections, lymphadenopathy. No CP/SOB/abdominal pain/N/V/D. Overall feel well without complaints. Pleased to her WBC stable with BCR-ABL continuing to fall.    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 Remains exceptionally active. Plan for golfing trip with friends where he plays in [place] and [place].         Oncology History Summary:   --Presented to [facility] ED on 4/23/19. He reported that he went to his PCP with left abdominal pain and temp of 100.4 the day before and was sent by PCP for CT. CT showed splenomegaly. Pt was instructed to go to ED. Went to [facility] ED and was found to have leukocytosis. Wanted to transfer patient to [facility], but no beds were available. Patient reportedly left [facility] ED AMA and came to [facility] ED over night. WBC 238K. Suspicious for acute leukemia. He denies any aches, chills, n/v/d, constipation, chest pain, bleeding or bruising. C/o SOB on exertion and mild, non-productive cough. Patient states that he has lost 8 lbs in the last few weeks.  --Peripheral smear was reviewed in the ED which was highly suggestive of CML  --Bone marrow biopsy on 4/25/19: CHRONIC MYELOID LEUKEMIA, BCR-ABL1-POSITIVE, CHRONIC PHASE. FOCAL GRADE 2 RETICULAR FIBROSIS  --BCR-ABL P210 80%  --Began imatinib on Saturday May 11, 2019      Past Medical History, Social History, and Past Family History are unchanged since last evaluation except  for HPI.     CURRENT MEDICATIONS:   Current Outpatient Medications   Medication Sig    fluticasone propionate (FLONASE) 50 mcg/actuation nasal spray 2 sprays by Each Nostril route once daily.    imatinib (GLEEVEC) 100 MG Tab Take 6 tablets (600 mg total) by mouth once daily.     Current Facility-Administered Medications   Medication    acetaminophen tablet 650 mg    albuterol inhaler 2 puff    diphenhydrAMINE injection 25 mg    EPINEPHrine (EPIPEN) 0.3 mg/0.3 mL pen injection 0.3 mg    methylPREDNISolone sodium succinate injection 40 mg    ondansetron disintegrating tablet 4 mg    sodium chloride 0.9% 500 mL flush bag    sodium chloride 0.9% flush 10 mL       ALLERGIES:   Review of patient's allergies indicates:  No Known Allergies      Review of Systems:     As noted in interval history, otherwise negative.    Physical Exam:     Vitals:    09/30/22 1353   BP: 136/76   Pulse: 70   Resp: 18   Temp: 98.3 °F (36.8 °C)       Physical Exam  Constitutional:       General: He is not in acute distress.     Appearance: Normal appearance.   HENT:      Head: Normocephalic.      Right Ear: External ear normal.      Left Ear: External ear normal.      Nose: Nose normal.   Eyes:      Extraocular Movements: Extraocular movements intact.      Pupils: Pupils are equal, round, and reactive to light.   Cardiovascular:      Rate and Rhythm: Normal rate and regular rhythm.   Pulmonary:      Effort: Pulmonary effort is normal.      Breath sounds: Normal breath sounds.   Abdominal:      General: Abdomen is flat. Bowel sounds are normal.   Musculoskeletal:         General: No swelling.      Cervical back: Neck supple.   Skin:     General: Skin is warm.   Neurological:      General: No focal deficit present.      Mental Status: He is alert and oriented to person, place, and time.      Cranial Nerves: No cranial nerve deficit.   Psychiatric:         Mood and Affect: Mood normal.         ECOG Performance Status: (foot note - ECOG PS  provided by Eastern Cooperative Oncology Group) 0 - Asymptomatic    Karnofsky Performance Score:  100%- Normal, No Complaints, No Evidence of Disease    Labs:   Lab Results   Component Value Date    WBC 5.99 09/23/2022    HGB 14.1 09/23/2022    HCT 41.4 09/23/2022    MCV 99 (H) 09/23/2022     09/23/2022       Lab Results   Component Value Date     09/23/2022    K 3.8 09/23/2022     09/23/2022    CO2 29 09/23/2022    BUN 17 09/23/2022    CREATININE 1.18 09/23/2022    ALBUMIN 4.2 09/23/2022    BILITOT 0.6 09/23/2022    ALKPHOS 52 09/23/2022    AST 43 09/23/2022    ALT 29 09/23/2022          Assessment and Plan:   Romeo Couch Sherie (Romeo) is a pleasant 52 y.o.male with history of CML.    CML  --Diagnosed in 2019  --Began Imatinib 400mg daily on Sat May 11th, with a goal of of less that 10% BCR-ABL at 6 months  --Increased Imatinib to 600mg daily in November with a decrease in BCR-ABL from 60% to 28% to 19% to 10% to 0.2% and now 0.3%  --Symptom management with calcium supplements and tonic water  --Will check labs q 3 months    2. Anemia  --Due to high CML burden, Imatinib  --Relatively stable and near baseline,  14.1 today. Asymptomatic, continue to monitor         Follow Up:      he will return to clinic in 3 months with labs 1 week earlier, but knows to call in the interim if symptoms change or should a problem arise.    BMT Chart Routing      Follow up with physician 3 months. 1. labs mid/late december close to home 2.see me 1 week later before end of the year  [okay to overbook me]   Follow up with RAN    Infusion scheduling note    Injection scheduling note    Labs CBC, CMP and BCR/ABL   Lab interval:  Please schedule CBC, CMP, BCR/ABL at [facility]   Imaging    Pharmacy appointment    Other referrals             Catalina Rucker MD  Bone Marrow Transplant Physician

## 2022-10-11 ENCOUNTER — SPECIALTY PHARMACY (OUTPATIENT)
Dept: PHARMACY | Facility: CLINIC | Age: 52
End: 2022-10-11
Payer: COMMERCIAL

## 2022-10-11 NOTE — TELEPHONE ENCOUNTER
Specialty Pharmacy - Clinical Reassessment    Specialty Medication Orders Linked to Encounter      Flowsheet Row Most Recent Value   Medication #1 imatinib (GLEEVEC) 100 MG Tab (Order#[id number], Rx#[id number])          Patient Diagnosis   C92.10 - CML (chronic myelocytic leukemia)    Romeo Couch Jr. is a 52 y.o. male, who is followed by the specialty pharmacy service for management and education of his Gleevec.  He has been on therapy with Gleevec for 3 years.  I have reviewed his electronic medical record and current medication list and determined that specialty medication adjustment Is Not needed at this time.    Patient has not experienced adverse events.  He Is adherent reporting 0 missed doses since last review.   He is meeting goals of therapy and will continue treatment.        10/11/2022     9:39 AM 9/16/2022     2:56 PM 8/16/2022     4:50 PM 7/20/2022    11:27 AM 6/20/2022    10:37 AM 5/20/2022     4:58 PM 4/20/2022    11:59 AM   Follow Up Review   # of missed doses  0 0 0 0 0 0   New Medications?  No No No No No No   New Conditions?  No No No No No No   New Allergies?  No No No No No No   Med Effective?  Good Excellent Good Good Good Good   Missed activities? No         Urgent Care?  No No No No No No   Requested Pharmacist?  No No No No No No         Therapy is appropriate to continue.    Therapy is effective: Yes  On scale of 1 to 10, how does patient rank quality of life? (10 - Best): Unable to Assess  Recommendations: none at this time.  Review Method: Chart Review    Tasks added this encounter   4/2/2023 - Clinical - Follow Up Assesement (180 day)   Tasks due within next 3 months   10/15/2022 - Refill Call (Auto Added)     Bethany Armendariz, PharmD  [facility]  [address]  [facility] [address]  Phone: [phone]  Fax: [phone]

## 2022-10-18 ENCOUNTER — SPECIALTY PHARMACY (OUTPATIENT)
Dept: PHARMACY | Facility: CLINIC | Age: 52
End: 2022-10-18
Payer: COMMERCIAL

## 2022-10-18 NOTE — TELEPHONE ENCOUNTER
Specialty Pharmacy - Refill Coordination    Specialty Medication Orders Linked to Encounter      Flowsheet Row Most Recent Value   Medication #1 imatinib (GLEEVEC) 100 MG Tab (Order#[id number], Rx#[id number])            Refill Questions - Documented Responses      Flowsheet Row Most Recent Value   Patient Availability and HIPAA Verification    Does patient want to proceed with activity? Yes   HIPAA/medical authority confirmed? Yes   Relationship to patient of person spoken to? Self   Refill Screening Questions    Changes to allergies? No   Changes to medications? No   New conditions since last clinic visit? No   Unplanned office visit, urgent care, ED, or hospital admission in the last 4 weeks? No   How does patient/caregiver feel medication is working? Good   Financial problems or insurance changes? No   How many doses of your specialty medications were missed in the last 4 weeks? 0   Would patient like to speak to a pharmacist? No   When does the patient need to receive the medication? 10/22/22   Refill Delivery Questions    How will the patient receive the medication? MEDRx   When does the patient need to receive the medication? 10/22/22   Shipping Address Home   Address in [facility] confirmed and updated if neccessary? Yes   Expected Copay ($) 250   Is the patient able to afford the medication copay? Yes   Payment Method CC on file   Days supply of Refill 30   Supplies needed? No supplies needed   Refill activity completed? Yes   Refill activity plan Refill scheduled   Shipment/Pickup Date: 10/20/22            Current Outpatient Medications   Medication Sig    fluticasone propionate (FLONASE) 50 mcg/actuation nasal spray 2 sprays by Each Nostril route once daily.    imatinib (GLEEVEC) 100 MG Tab Take 6 tablets (600 mg total) by mouth once daily.   Last reviewed on 9/30/2022  1:53 PM by Owen Carter MA    Review of patient's allergies indicates:  No Known Allergies Last reviewed on  9/30/2022 1:53 PM  by Owen Carter      Tasks added this encounter   No tasks added.   Tasks due within next 3 months   10/15/2022 - Refill Call (Auto Added)     Minnie Dewitt, PharmD  [facility]  [address]  [facility] [address]  Phone: [phone]  Fax: [phone]

## 2022-11-15 ENCOUNTER — PATIENT MESSAGE (OUTPATIENT)
Dept: PHARMACY | Facility: CLINIC | Age: 52
End: 2022-11-15
Payer: COMMERCIAL

## 2022-11-17 ENCOUNTER — SPECIALTY PHARMACY (OUTPATIENT)
Dept: PHARMACY | Facility: CLINIC | Age: 52
End: 2022-11-17
Payer: COMMERCIAL

## 2022-11-17 NOTE — TELEPHONE ENCOUNTER
Specialty Pharmacy - Refill Coordination    Specialty Medication Orders Linked to Encounter      Flowsheet Row Most Recent Value   Medication #1 imatinib (GLEEVEC) 100 MG Tab (Order#[id number], Rx#[id number])            Refill Questions - Documented Responses      Flowsheet Row Most Recent Value   Patient Availability and HIPAA Verification    Does patient want to proceed with activity? Yes   HIPAA/medical authority confirmed? Yes   Relationship to patient of person spoken to? Self   Refill Screening Questions    Changes to allergies? No   Changes to medications? No   New conditions since last clinic visit? No   Unplanned office visit, urgent care, ED, or hospital admission in the last 4 weeks? No   How does patient/caregiver feel medication is working? Good   Financial problems or insurance changes? No   How many doses of your specialty medications were missed in the last 4 weeks? 0   Would patient like to speak to a pharmacist? No   When does the patient need to receive the medication? 11/18/22   Refill Delivery Questions    How will the patient receive the medication? MEDRx   When does the patient need to receive the medication? 11/18/22   Shipping Address Home   Address in [facility] confirmed and updated if neccessary? Yes   Expected Copay ($) 250   Is the patient able to afford the medication copay? Yes   Payment Method CC on file   Days supply of Refill 30   Supplies needed? No supplies needed   Refill activity completed? Yes   Refill activity plan Refill scheduled   Shipment/Pickup Date: 11/18/22            Current Outpatient Medications   Medication Sig    fluticasone propionate (FLONASE) 50 mcg/actuation nasal spray 2 sprays by Each Nostril route once daily.    imatinib (GLEEVEC) 100 MG Tab Take 6 tablets (600 mg total) by mouth once daily.   Last reviewed on 9/30/2022  1:53 PM by Owen Carter MA    Review of patient's allergies indicates:  No Known Allergies Last reviewed on  9/30/2022 1:53 PM  by Owen Carter      Tasks added this encounter   12/11/2022 - Refill Call (Auto Added)   Tasks due within next 3 months   No tasks due.     [facility]  [address] [facility] [address]  Phone: [phone]  Fax: [phone]

## 2022-12-12 ENCOUNTER — SPECIALTY PHARMACY (OUTPATIENT)
Dept: PHARMACY | Facility: CLINIC | Age: 52
End: 2022-12-12
Payer: COMMERCIAL

## 2022-12-12 NOTE — TELEPHONE ENCOUNTER
Outgoing call regarding Gleevec refill, pt stated to call back on 12/13, he wants to check his on hands before filling.

## 2022-12-14 NOTE — TELEPHONE ENCOUNTER
Specialty Pharmacy - Refill Coordination    Specialty Medication Orders Linked to Encounter      Flowsheet Row Most Recent Value   Medication #1 imatinib (GLEEVEC) 100 MG Tab (Order#[id number], Rx#[id number])            Refill Questions - Documented Responses      Flowsheet Row Most Recent Value   Patient Availability and HIPAA Verification    Does patient want to proceed with activity? Yes   HIPAA/medical authority confirmed? Yes   Relationship to patient of person spoken to? Self   Refill Screening Questions    Changes to allergies? No   Changes to medications? No   New conditions since last clinic visit? No   Unplanned office visit, urgent care, ED, or hospital admission in the last 4 weeks? No   How does patient/caregiver feel medication is working? Good   Financial problems or insurance changes? No   How many doses of your specialty medications were missed in the last 4 weeks? 0   Would patient like to speak to a pharmacist? No   When does the patient need to receive the medication? 12/18/22   Refill Delivery Questions    How will the patient receive the medication? MEDRx   When does the patient need to receive the medication? 12/18/22   Shipping Address Home   Address in [facility] confirmed and updated if neccessary? Yes   Expected Copay ($) 35.6   Is the patient able to afford the medication copay? Yes   Payment Method CC on file   Days supply of Refill 30   Supplies needed? No supplies needed   Refill activity completed? Yes   Refill activity plan Refill scheduled   Shipment/Pickup Date: 12/15/22            Current Outpatient Medications   Medication Sig    fluticasone propionate (FLONASE) 50 mcg/actuation nasal spray 2 sprays by Each Nostril route once daily.    imatinib (GLEEVEC) 100 MG Tab Take 6 tablets (600 mg total) by mouth once daily.   Last reviewed on 9/30/2022  1:53 PM by Owen Carter MA    Review of patient's allergies indicates:  No Known Allergies Last reviewed on  9/30/2022 1:53 PM  by Owen Carter      Tasks added this encounter   1/10/2023 - Refill Call (Auto Added)   Tasks due within next 3 months   No tasks due.     [facility]  [address]  [facility] [address]  Phone: [phone]  Fax: [phone]

## 2022-12-29 ENCOUNTER — OFFICE VISIT (OUTPATIENT)
Dept: HEMATOLOGY/ONCOLOGY | Facility: CLINIC | Age: 52
End: 2022-12-29
Payer: COMMERCIAL

## 2022-12-29 ENCOUNTER — PATIENT MESSAGE (OUTPATIENT)
Dept: HEMATOLOGY/ONCOLOGY | Facility: CLINIC | Age: 52
End: 2022-12-29

## 2022-12-29 VITALS
BODY MASS INDEX: 31.2 KG/M2 | HEART RATE: 74 BPM | TEMPERATURE: 99 F | SYSTOLIC BLOOD PRESSURE: 160 MMHG | WEIGHT: 210.63 LBS | DIASTOLIC BLOOD PRESSURE: 83 MMHG | OXYGEN SATURATION: 100 % | RESPIRATION RATE: 16 BRPM | HEIGHT: 69 IN

## 2022-12-29 DIAGNOSIS — D72.110 IDIOPATHIC HYPEREOSINOPHILIC SYNDROME: ICD-10-CM

## 2022-12-29 DIAGNOSIS — R03.0 ELEVATED BLOOD PRESSURE READING: ICD-10-CM

## 2022-12-29 DIAGNOSIS — C92.10 CML (CHRONIC MYELOCYTIC LEUKEMIA): Primary | ICD-10-CM

## 2022-12-29 PROCEDURE — 3079F DIAST BP 80-89 MM HG: CPT | Mod: CPTII,S$GLB,, | Performed by: INTERNAL MEDICINE

## 2022-12-29 PROCEDURE — 99999 PR PBB SHADOW E&M-EST. PATIENT-LVL IV: CPT | Mod: PBBFAC,,, | Performed by: INTERNAL MEDICINE

## 2022-12-29 PROCEDURE — 3077F SYST BP >= 140 MM HG: CPT | Mod: CPTII,S$GLB,, | Performed by: INTERNAL MEDICINE

## 2022-12-29 PROCEDURE — 3008F BODY MASS INDEX DOCD: CPT | Mod: CPTII,S$GLB,, | Performed by: INTERNAL MEDICINE

## 2022-12-29 PROCEDURE — 99215 OFFICE O/P EST HI 40 MIN: CPT | Mod: S$GLB,,, | Performed by: INTERNAL MEDICINE

## 2022-12-29 PROCEDURE — 1159F PR MEDICATION LIST DOCUMENTED IN MEDICAL RECORD: ICD-10-PCS | Mod: CPTII,S$GLB,, | Performed by: INTERNAL MEDICINE

## 2022-12-29 PROCEDURE — 3077F PR MOST RECENT SYSTOLIC BLOOD PRESSURE >= 140 MM HG: ICD-10-PCS | Mod: CPTII,S$GLB,, | Performed by: INTERNAL MEDICINE

## 2022-12-29 PROCEDURE — 99999 PR PBB SHADOW E&M-EST. PATIENT-LVL IV: ICD-10-PCS | Mod: PBBFAC,,, | Performed by: INTERNAL MEDICINE

## 2022-12-29 PROCEDURE — 99215 PR OFFICE/OUTPT VISIT, EST, LEVL V, 40-54 MIN: ICD-10-PCS | Mod: S$GLB,,, | Performed by: INTERNAL MEDICINE

## 2022-12-29 PROCEDURE — 1159F MED LIST DOCD IN RCRD: CPT | Mod: CPTII,S$GLB,, | Performed by: INTERNAL MEDICINE

## 2022-12-29 PROCEDURE — 3079F PR MOST RECENT DIASTOLIC BLOOD PRESSURE 80-89 MM HG: ICD-10-PCS | Mod: CPTII,S$GLB,, | Performed by: INTERNAL MEDICINE

## 2022-12-29 PROCEDURE — 3008F PR BODY MASS INDEX (BMI) DOCUMENTED: ICD-10-PCS | Mod: CPTII,S$GLB,, | Performed by: INTERNAL MEDICINE

## 2022-12-29 NOTE — PROGRESS NOTES
Section of Hematology and Stem Cell Transplantation  Follow Up Note     Visit Date: 12/29/2022    Primary Oncologic Diagnosis: CML    History of Present Ilness:   Romeo Couch Jr. (Romeo) is a pleasant 52 y.o.male with history of CML diagnosed in 2019, on Imatinib therapy.     Interval History:   Mr. Couch continues to work hard in his job. No change in activity, appetite or energy. Remains very busy.        Oncology History Summary:   --Presented to [facility] ED on 4/23/19. He reported that he went to his PCP with left abdominal pain and temp of 100.4 the day before and was sent by PCP for CT. CT showed splenomegaly. Pt was instructed to go to ED. Went to [facility] and was found to have leukocytosis. Wanted to transfer patient to [facility], but no beds were available. Patient reportedly left [facility] ED AMA and came to [facility] ED over night. WBC 238K. Suspicious for acute leukemia. He denies any aches, chills, n/v/d, constipation, chest pain, bleeding or bruising. C/o SOB on exertion and mild, non-productive cough. Patient states that he has lost 8 lbs in the last few weeks.  --Peripheral smear was reviewed in the ED which was highly suggestive of CML  --Bone marrow biopsy on 4/25/19: CHRONIC MYELOID LEUKEMIA, BCR-ABL1-POSITIVE, CHRONIC PHASE. FOCAL GRADE 2 RETICULAR FIBROSIS  --BCR-ABL P210 80%  --Began imatinib on Saturday May 11, 2019      Past Medical History, Social History, and Past Family History are unchanged since last evaluation except for HPI.     CURRENT MEDICATIONS:   Current Outpatient Medications   Medication Sig    imatinib (GLEEVEC) 100 MG Tab Take 6 tablets (600 mg total) by mouth once daily.    fluticasone propionate (FLONASE) 50 mcg/actuation nasal spray 2 sprays by Each Nostril route once daily.     Current Facility-Administered Medications   Medication    acetaminophen tablet 650 mg    albuterol inhaler 2 puff    diphenhydrAMINE injection 25 mg    EPINEPHrine (EPIPEN) 0.3 mg/0.3 mL pen  injection 0.3 mg    methylPREDNISolone sodium succinate injection 40 mg    ondansetron disintegrating tablet 4 mg    sodium chloride 0.9% 500 mL flush bag    sodium chloride 0.9% flush 10 mL       ALLERGIES:   Review of patient's allergies indicates:  No Known Allergies      Review of Systems:     As noted in interval history, otherwise negative.    Physical Exam:     Vitals:    12/29/22 1547   BP: (!) 160/83   Pulse: 74   Resp: 16   Temp: 98.5 °F (36.9 °C)       Physical Exam  Constitutional:       General: He is not in acute distress.     Appearance: Normal appearance.   HENT:      Head: Normocephalic.      Right Ear: External ear normal.      Left Ear: External ear normal.      Nose: Nose normal.   Eyes:      Extraocular Movements: Extraocular movements intact.      Pupils: Pupils are equal, round, and reactive to light.   Cardiovascular:      Rate and Rhythm: Normal rate and regular rhythm.   Pulmonary:      Effort: Pulmonary effort is normal.      Breath sounds: Normal breath sounds.   Abdominal:      General: Abdomen is flat. Bowel sounds are normal.   Musculoskeletal:         General: No swelling.      Cervical back: Neck supple.   Skin:     General: Skin is warm.   Neurological:      General: No focal deficit present.      Mental Status: He is alert and oriented to person, place, and time.      Cranial Nerves: No cranial nerve deficit.   Psychiatric:         Mood and Affect: Mood normal.         ECOG Performance Status: (foot note - ECOG PS provided by Eastern Cooperative Oncology Group) 0 - Asymptomatic    Karnofsky Performance Score:  100%- Normal, No Complaints, No Evidence of Disease    Labs:   Lab Results   Component Value Date    WBC 5.09 12/23/2022    HGB 14.3 12/23/2022    HCT 42.3 12/23/2022    MCV 98 12/23/2022     12/23/2022       Lab Results   Component Value Date     12/23/2022    K 3.8 12/23/2022     12/23/2022    CO2 32 (H) 12/23/2022    BUN 14 12/23/2022    CREATININE 1.17  12/23/2022    ALBUMIN 4.3 12/23/2022    BILITOT 0.7 12/23/2022    ALKPHOS 51 12/23/2022    AST 46 12/23/2022    ALT 36 12/23/2022          Assessment and Plan:   Romeo Couch Jr. (Romeo) is a pleasant 52 y.o.male with history of CML.    CML  --Diagnosed in 2019  --Began Imatinib 400mg daily on Sat May 11th, with a goal of of less that 10% BCR-ABL at 6 months  --Increased Imatinib to 600mg daily in November with a decrease in BCR-ABL from 60% to 28% to 19% to 10% to 0.2% and now 0.3%  --Symptom management with calcium supplements and tonic water  --Will check labs q 3 months    2. Anemia  --Due to high CML burden, Imatinib  --Relatively stable and near baseline,14.3 today. Asymptomatic, continue to monitor         Follow Up:      he will return to clinic in 3 months with labs 1 week earlier, but knows to call in the interim if symptoms change or should a problem arise.    BMT Chart Routing      Follow up with physician 3 months. 1. labs in 3 months cbc,cmp, bcr-abl at [facility] 2. see me one month later   Follow up with RAN    Provider visit type    Infusion scheduling note    Injection scheduling note    Labs    Imaging    Pharmacy appointment    Other referrals           Catalina Rucker MD  Bone Marrow Transplant Physician

## 2023-01-12 ENCOUNTER — SPECIALTY PHARMACY (OUTPATIENT)
Dept: PHARMACY | Facility: CLINIC | Age: 53
End: 2023-01-12
Payer: COMMERCIAL

## 2023-01-13 NOTE — TELEPHONE ENCOUNTER
Gleevec approved. PA reference number: [id number] has been approved 01/12/2023-01/11/2024.     Copay is $250.     Benefits investigation:     Benefits Status Complete   Financial Assistance Status Not Required   Is this a commercially insured No Go? No   Patient Coverage Type(s) Primary Insurance   Primary Plan - Coverage Line Number 8   Primary Plan - Name RX [employer] MED[employer]   Primary Plan - Payor Segment Commercial   Primary Plan - Annual Deductible Amount ($) 0   Primary Plan - Annual Out of Packet (OOP) Maximum ($) 3000   Primary Plan - Estimated Copay ($) 250   Primary Plan - [facility] Status In network   NoGo Referral? No

## 2023-01-13 NOTE — TELEPHONE ENCOUNTER
Specialty Pharmacy - Medication/Referral Authorization  Specialty Pharmacy - Refill Coordination    Specialty Medication Orders Linked to Encounter      Flowsheet Row Most Recent Value   Medication #1 imatinib (GLEEVEC) 100 MG Tab (Order#[id number], Rx#[id number])            Refill Questions - Documented Responses      Flowsheet Row Most Recent Value   Refill Screening Questions    Changes to allergies? No   Changes to medications? No   New conditions since last clinic visit? No   Unplanned office visit, urgent care, ED, or hospital admission in the last 4 weeks? No   How does patient/caregiver feel medication is working? Good   Financial problems or insurance changes? No   How many doses of your specialty medications were missed in the last 4 weeks? 0   Would patient like to speak to a pharmacist? No   When does the patient need to receive the medication? 01/19/23   Refill Delivery Questions    How will the patient receive the medication? MEDRx   When does the patient need to receive the medication? 01/19/23   Shipping Address Home   Address in [facility] confirmed and updated if neccessary? Yes   Expected Copay ($) 250   Is the patient able to afford the medication copay? Yes   Payment Method CC on file   Days supply of Refill 30   Supplies needed? No supplies needed   Refill activity completed? Yes   Refill activity plan Refill scheduled   Shipment/Pickup Date: 01/13/23            Current Outpatient Medications   Medication Sig    fluticasone propionate (FLONASE) 50 mcg/actuation nasal spray 2 sprays by Each Nostril route once daily.    imatinib (GLEEVEC) 100 MG Tab Take 6 tablets (600 mg total) by mouth once daily.   Last reviewed on 12/29/2022  3:46 PM by Lori Carter MA    Review of patient's allergies indicates:  No Known Allergies Last reviewed on  12/29/2022 3:46 PM by Lori Carter      Tasks added this encounter   No tasks added.   Tasks due within next 3 months   4/2/2023 - Clinical - Follow  Up Assesement (180 day)  1/10/2023 - Refill Call (Auto Added)     MATTIE MENDOZA, PharmD  [facility]  [address]  [facility] [address]  Phone: [phone]  Fax: [phone]

## 2023-02-13 ENCOUNTER — PATIENT MESSAGE (OUTPATIENT)
Dept: PHARMACY | Facility: CLINIC | Age: 53
End: 2023-02-13
Payer: COMMERCIAL

## 2023-02-15 ENCOUNTER — SPECIALTY PHARMACY (OUTPATIENT)
Dept: PHARMACY | Facility: CLINIC | Age: 53
End: 2023-02-15
Payer: COMMERCIAL

## 2023-02-15 DIAGNOSIS — C92.10 CML (CHRONIC MYELOCYTIC LEUKEMIA): Primary | ICD-10-CM

## 2023-02-15 NOTE — TELEPHONE ENCOUNTER
Specialty Pharmacy - Refill Coordination    Specialty Medication Orders Linked to Encounter      Flowsheet Row Most Recent Value   Medication #1 Covid-19 vaccine, AD25.COV2.S (ARGELIA, COVID-19,) 0.5 mL injection (Order#[id number], Rx#[id number])            Refill Questions - Documented Responses      Flowsheet Row Most Recent Value   Patient Availability and HIPAA Verification    Does patient want to proceed with activity? Yes   HIPAA/medical authority confirmed? Yes   Relationship to patient of person spoken to? Self   Refill Screening Questions    Changes to allergies? No   Changes to medications? No   New conditions since last clinic visit? No   Unplanned office visit, urgent care, ED, or hospital admission in the last 4 weeks? No   How does patient/caregiver feel medication is working? Good   Financial problems or insurance changes? No   How many doses of your specialty medications were missed in the last 4 weeks? 0   Would patient like to speak to a pharmacist? No   When does the patient need to receive the medication? 02/16/23   Refill Delivery Questions    How will the patient receive the medication? MEDRx   When does the patient need to receive the medication? 02/16/23   Shipping Address Home   Address in [facility] confirmed and updated if neccessary? Yes   Expected Copay ($) 250   Is the patient able to afford the medication copay? Yes   Payment Method CC on file   Days supply of Refill 30   Supplies needed? No supplies needed   Refill activity completed? Yes   Refill activity plan Refill scheduled   Shipment/Pickup Date: 02/15/23            Current Outpatient Medications   Medication Sig    fluticasone propionate (FLONASE) 50 mcg/actuation nasal spray 2 sprays by Each Nostril route once daily.    imatinib (GLEEVEC) 100 MG Tab Take 6 tablets (600 mg total) by mouth once daily.   Last reviewed on 12/29/2022  3:46 PM by Lori Carter MA    Review of patient's allergies indicates:  No Known  Allergies Last reviewed on  12/29/2022 3:46 PM by Lori Carter      Tasks added this encounter   3/11/2023 - Refill Call (Auto Added)   Tasks due within next 3 months   4/2/2023 - Clinical - Follow Up Assesement (180 day)     Lashay Kwong, PharmD  [facility]  [facility] [address]  [facility] [address]  Phone: [phone]  Fax: [phone]

## 2023-03-02 ENCOUNTER — PATIENT MESSAGE (OUTPATIENT)
Dept: RESEARCH | Facility: HOSPITAL | Age: 53
End: 2023-03-02
Payer: COMMERCIAL

## 2023-03-08 ENCOUNTER — PATIENT MESSAGE (OUTPATIENT)
Dept: HEMATOLOGY/ONCOLOGY | Facility: CLINIC | Age: 53
End: 2023-03-08
Payer: COMMERCIAL

## 2023-03-13 ENCOUNTER — PATIENT MESSAGE (OUTPATIENT)
Dept: PHARMACY | Facility: CLINIC | Age: 53
End: 2023-03-13
Payer: COMMERCIAL

## 2023-03-14 ENCOUNTER — OFFICE VISIT (OUTPATIENT)
Dept: HEMATOLOGY/ONCOLOGY | Facility: CLINIC | Age: 53
End: 2023-03-14
Payer: COMMERCIAL

## 2023-03-14 DIAGNOSIS — C92.10 CML (CHRONIC MYELOCYTIC LEUKEMIA): Primary | ICD-10-CM

## 2023-03-14 DIAGNOSIS — R03.0 ELEVATED BLOOD PRESSURE READING: ICD-10-CM

## 2023-03-14 PROCEDURE — 99215 PR OFFICE/OUTPT VISIT, EST, LEVL V, 40-54 MIN: ICD-10-PCS | Mod: 95,,, | Performed by: INTERNAL MEDICINE

## 2023-03-14 PROCEDURE — 99215 OFFICE O/P EST HI 40 MIN: CPT | Mod: 95,,, | Performed by: INTERNAL MEDICINE

## 2023-03-14 NOTE — PROGRESS NOTES
Section of Hematology and Stem Cell Transplantation  Follow Up Note     Visit Date: 03/14/2023    Primary Oncologic Diagnosis: CML    Telemedicine Documentation:  The patient location is: home  The chief complaint leading to consultation is: CML    Visit type: audiovisual    Face to Face time with patient: 20  30 minutes of total time spent on the encounter, which includes face to face time and non-face to face time preparing to see the patient (eg, review of tests), Obtaining and/or reviewing separately obtained history, Documenting clinical information in the electronic or other health record, Independently interpreting results (not separately reported) and communicating results to the patient/family/caregiver, or Care coordination (not separately reported).         Each patient to whom he or she provides medical services by telemedicine is:  (1) informed of the relationship between the physician and patient and the respective role of any other health care provider with respect to management of the patient; and (2) notified that he or she may decline to receive medical services by telemedicine and may withdraw from such care at any time.      History of Present Ilness:   Romeo Couch Jr. (Romeo) is a pleasant 53 y.o.male with history of CML diagnosed in 2019, on Imatinib therapy.     Interval History:   Mr. Couch continues to work hard in his job. No change in activity, appetite or energy. Remains very busy.    Going to [place] next month for 10 days to [place] and [facility]. Annual golf trip planned for early Fall.       Oncology History Summary:   --Presented to [facility] ED on 4/23/19. He reported that he went to his PCP with left abdominal pain and temp of 100.4 the day before and was sent by PCP for CT. CT showed splenomegaly. Pt was instructed to go to ED. Went to [facility] ED and was found to have leukocytosis. Wanted to transfer patient to [facility], but no beds were available. Patient reportedly left [facility] ED  AMA and came to [facility] ED over night. WBC 238K. Suspicious for acute leukemia. He denies any aches, chills, n/v/d, constipation, chest pain, bleeding or bruising. C/o SOB on exertion and mild, non-productive cough. Patient states that he has lost 8 lbs in the last few weeks.  --Peripheral smear was reviewed in the ED which was highly suggestive of CML  --Bone marrow biopsy on 4/25/19: CHRONIC MYELOID LEUKEMIA, BCR-ABL1-POSITIVE, CHRONIC PHASE. FOCAL GRADE 2 RETICULAR FIBROSIS  --BCR-ABL P210 80%  --Began imatinib on Saturday May 11, 2019      Past Medical History, Social History, and Past Family History are unchanged since last evaluation except for HPI.     CURRENT MEDICATIONS:   Current Outpatient Medications   Medication Sig    fluticasone propionate (FLONASE) 50 mcg/actuation nasal spray 2 sprays by Each Nostril route once daily.    imatinib (GLEEVEC) 100 MG Tab Take 6 tablets (600 mg total) by mouth once daily.     Current Facility-Administered Medications   Medication    acetaminophen tablet 650 mg    albuterol inhaler 2 puff    diphenhydrAMINE injection 25 mg    EPINEPHrine (EPIPEN) 0.3 mg/0.3 mL pen injection 0.3 mg    methylPREDNISolone sodium succinate injection 40 mg    ondansetron disintegrating tablet 4 mg    sodium chloride 0.9% 500 mL flush bag    sodium chloride 0.9% flush 10 mL       ALLERGIES:   Review of patient's allergies indicates:  No Known Allergies      Review of Systems:     Review of Systems   Constitutional:  Negative for fever, malaise/fatigue and weight loss.   Eyes:  Positive for discharge. Negative for double vision and photophobia.   Respiratory:  Negative for cough and shortness of breath.    Cardiovascular:  Negative for chest pain.   Gastrointestinal:  Negative for abdominal pain and diarrhea.   Genitourinary:  Negative for frequency.   Musculoskeletal:  Negative for back pain.   Skin:  Negative for rash.   Neurological:  Negative for dizziness, tremors, weakness and headaches.    Psychiatric/Behavioral:  The patient is not nervous/anxious.        Physical Exam:     Limited secondary to virtual visit    ECOG Performance Status: (foot note - ECOG PS provided by Eastern Cooperative Oncology Group) 0 - Asymptomatic    Karnofsky Performance Score:  100%- Normal, No Complaints, No Evidence of Disease    Labs:   Lab Results   Component Value Date    WBC 3.57 (L) 03/03/2023    HGB 14.8 03/03/2023    HCT 43.8 03/03/2023    MCV 98 03/03/2023     03/03/2023       Lab Results   Component Value Date     03/03/2023    K 4.1 03/03/2023     03/03/2023    CO2 29 03/03/2023    BUN 17 03/03/2023    CREATININE 1.15 03/03/2023    ALBUMIN 4.5 03/03/2023    BILITOT 0.6 03/03/2023    ALKPHOS 54 03/03/2023    AST 44 03/03/2023    ALT 31 03/03/2023          Assessment and Plan:   Romeo VALADEZ Iza RuizSherie (Roemo) is a pleasant 53 y.o.male with history of CML.    CML  --Diagnosed in 2019  --Began Imatinib 400mg daily on Sat May 11th, with a goal of of less that 10% BCR-ABL at 6 months  --Increased Imatinib to 600mg daily in November with a decrease in BCR-ABL from 60% to 28% to 19% to 10% to 0.2%   --Symptom management with calcium supplements and tonic water  --Will check labs q 3 months    2. Anemia  --Iniitially due to high CML burden, Imatinib  --Resolved and at baseline 14.8 today  --Asymptomatic, continue to monitor         Follow Up:      he will return to clinic in 3 months with labs 1 week earlier, but knows to call in the interim if symptoms change or should a problem arise.      BMT Chart Routing      Follow up with physician 3 months. 1. labs in 3 months at [facility] [cbc,cmp, bcr-abl p210] 2.see me 1 week after labs   Follow up with RAN    Provider visit type    Infusion scheduling note    Injection scheduling note    Labs    Imaging    Pharmacy appointment    Other referrals               Catalina Rucker MD  Bone Marrow Transplant Physician

## 2023-03-16 ENCOUNTER — SPECIALTY PHARMACY (OUTPATIENT)
Dept: PHARMACY | Facility: CLINIC | Age: 53
End: 2023-03-16
Payer: COMMERCIAL

## 2023-03-16 NOTE — TELEPHONE ENCOUNTER
Specialty Pharmacy - Refill Coordination    Specialty Medication Orders Linked to Encounter      Flowsheet Row Most Recent Value   Medication #1 imatinib (GLEEVEC) 100 MG Tab (Order#[id number], Rx#[id number])            Refill Questions - Documented Responses      Flowsheet Row Most Recent Value   Patient Availability and HIPAA Verification    Does patient want to proceed with activity? Yes   HIPAA/medical authority confirmed? Yes   Relationship to patient of person spoken to? Self   Refill Screening Questions    Changes to allergies? No   Changes to medications? No   New conditions since last clinic visit? No   Unplanned office visit, urgent care, ED, or hospital admission in the last 4 weeks? No   How does patient/caregiver feel medication is working? Good   Financial problems or insurance changes? No   How many doses of your specialty medications were missed in the last 4 weeks? 0   Would patient like to speak to a pharmacist? No   When does the patient need to receive the medication? 03/20/23   Refill Delivery Questions    How will the patient receive the medication? MEDRx   When does the patient need to receive the medication? 03/20/23   Shipping Address Home   Address in [facility] confirmed and updated if neccessary? Yes   Expected Copay ($) 250   Is the patient able to afford the medication copay? Yes   Payment Method CC on file   Days supply of Refill 30   Supplies needed? No supplies needed   Refill activity completed? Yes   Refill activity plan Refill scheduled   Shipment/Pickup Date: 03/16/23            Current Outpatient Medications   Medication Sig    fluticasone propionate (FLONASE) 50 mcg/actuation nasal spray 2 sprays by Each Nostril route once daily.    imatinib (GLEEVEC) 100 MG Tab Take 6 tablets (600 mg total) by mouth once daily.   Last reviewed on 12/29/2022  3:46 PM by Lori Carter MA    Review of patient's allergies indicates:  No Known Allergies Last reviewed on  12/29/2022 3:46  PM by Lori Carter      Tasks added this encounter   4/12/2023 - Refill Call (Auto Added)   Tasks due within next 3 months   4/2/2023 - Clinical - Follow Up Assesement (180 day)     Tim Landeros, PharmD  [facility]  [address]  [facility] [address]  Phone: [phone]  Fax: [phone]

## 2023-04-03 ENCOUNTER — SPECIALTY PHARMACY (OUTPATIENT)
Dept: PHARMACY | Facility: CLINIC | Age: 53
End: 2023-04-03
Payer: COMMERCIAL

## 2023-04-03 NOTE — TELEPHONE ENCOUNTER
"Specialty Pharmacy - Clinical Reassessment    Specialty Medication Orders Linked to Encounter      Flowsheet Row Most Recent Value   Medication #1 imatinib (GLEEVEC) 100 MG Tab (Order#[id number], Rx#[id number])          Patient Diagnosis   C92.10 - CML (chronic myelocytic leukemia)    Romeo Couch Jr. is a 53 y.o. male, who is followed by the specialty pharmacy service for management and education of his Gleevec.  He has been on therapy with Gleevec  since May 11, 2019.  I have reviewed his electronic medical record and current medication list and determined that specialty medication adjustment Is not needed at this time.    Patient has not experienced adverse events.  He Is adherent reporting 0 missed doses since last review.  Adherence has been encouraged with the following mechanism(s): directed education.  He is meeting goals of therapy and will continue treatment.        3/16/2023 2/15/2023 1/13/2023 12/14/2022 11/17/2022 10/18/2022 10/11/2022   Follow Up Review   # of missed doses 0 0 0 0 0 0    New Medications? No No No No No No    New Conditions? No No No No No No    New Allergies? No No No No No No    Med Effective? Good Good Good Good Good Good    Missed activities?       No   Urgent Care? No No No No No No    Requested Pharmacist? No No No No No No             Therapy is appropriate to continue.    Therapy is effective: Yes, Bcr/abl1 is 0.2% as of 3/3/2023  On scale of 1 to 10, how does patient rank quality of life? (10 - Best): Unable to Assess  Recommendations: none at this time.  Review Method: Chart Review    3/3/2023 labs has been reviewed. No adjustments are warranted. Patient last seen on 3/14/2023. Patient will remain on imatinib 600 mg QD as it has been effective. MD noted a "decrease in BCR-ABL from 60% to 28% to 19% to 10% to 0.2%".     Tasks added this encounter   No tasks added.   Tasks due within next 3 months   4/2/2023 - Clinical - Follow Up Assesement (180 day)  4/12/2023 - Refill Call " (Auto Added)     MATTIE MENDOZA, PharmD  [facility]  [address]  [facility] [address]  Phone: [phone]  Fax: [phone]

## 2023-04-10 ENCOUNTER — PATIENT MESSAGE (OUTPATIENT)
Dept: HEMATOLOGY/ONCOLOGY | Facility: CLINIC | Age: 53
End: 2023-04-10
Payer: COMMERCIAL

## 2023-04-10 DIAGNOSIS — C92.10 CML (CHRONIC MYELOCYTIC LEUKEMIA): ICD-10-CM

## 2023-04-11 ENCOUNTER — SPECIALTY PHARMACY (OUTPATIENT)
Dept: PHARMACY | Facility: CLINIC | Age: 53
End: 2023-04-11
Payer: COMMERCIAL

## 2023-04-11 NOTE — TELEPHONE ENCOUNTER
Specialty Pharmacy - Refill Coordination    Specialty Medication Orders Linked to Encounter      Flowsheet Row Most Recent Value   Medication #1 imatinib (GLEEVEC) 100 MG Tab (Order#[id number], Rx#[id number])            Refill Questions - Documented Responses      Flowsheet Row Most Recent Value   Patient Availability and HIPAA Verification    Does patient want to proceed with activity? Yes   HIPAA/medical authority confirmed? Yes   Relationship to patient of person spoken to? Spouse/Significant Other   Refill Screening Questions    Changes to allergies? No   Changes to medications? No   New conditions since last clinic visit? No   Unplanned office visit, urgent care, ED, or hospital admission in the last 4 weeks? No   How does patient/caregiver feel medication is working? Good   Financial problems or insurance changes? No   How many doses of your specialty medications were missed in the last 4 weeks? 0   Would patient like to speak to a pharmacist? No   When does the patient need to receive the medication? 04/18/23   Refill Delivery Questions    How will the patient receive the medication? MEDRx   When does the patient need to receive the medication? 04/18/23   Shipping Address Home   Address in [facility] confirmed and updated if neccessary? Yes   Expected Copay ($) 250   Is the patient able to afford the medication copay? Yes   Payment Method CC on file   Days supply of Refill 30   Supplies needed? No supplies needed   Refill activity completed? Yes   Refill activity plan Refill scheduled   Shipment/Pickup Date: 04/12/23            Current Outpatient Medications   Medication Sig    fluticasone propionate (FLONASE) 50 mcg/actuation nasal spray 2 sprays by Each Nostril route once daily.    imatinib (GLEEVEC) 100 MG Tab Take 6 tablets (600 mg total) by mouth once daily.   Last reviewed on 12/29/2022  3:46 PM by Lori Carter MA    Review of patient's allergies indicates:  No Known Allergies Last reviewed  on  4/11/2023 8:11 AM by Delia Sams      Tasks added this encounter   5/11/2023 - Refill Call (Auto Added)   Tasks due within next 3 months   No tasks due.     [facility]  [address] [place] [address]  Phone: [phone]  Fax: [phone]

## 2023-05-11 ENCOUNTER — SPECIALTY PHARMACY (OUTPATIENT)
Dept: PHARMACY | Facility: CLINIC | Age: 53
End: 2023-05-11
Payer: COMMERCIAL

## 2023-05-11 NOTE — TELEPHONE ENCOUNTER
Specialty Pharmacy - Refill Coordination    Specialty Medication Orders Linked to Encounter      Flowsheet Row Most Recent Value   Medication #1 imatinib (GLEEVEC) 100 MG Tab (Order#[id number], Rx#[id number])            Refill Questions - Documented Responses      Flowsheet Row Most Recent Value   Refill Screening Questions    Changes to allergies? No   Changes to medications? No   New conditions since last clinic visit? No   Unplanned office visit, urgent care, ED, or hospital admission in the last 4 weeks? No   How does patient/caregiver feel medication is working? Good   Financial problems or insurance changes? No   How many doses of your specialty medications were missed in the last 4 weeks? 0   Would patient like to speak to a pharmacist? No   When does the patient need to receive the medication? 05/21/23   Refill Delivery Questions    How will the patient receive the medication? MEDRx   When does the patient need to receive the medication? 05/21/23   Shipping Address Home   Address in [facility] confirmed and updated if neccessary? Yes   Expected Copay ($) 250   Is the patient able to afford the medication copay? Yes   Payment Method CC on file   Days supply of Refill 30   Supplies needed? No supplies needed   Refill activity completed? Yes   Refill activity plan Refill scheduled   Shipment/Pickup Date: 05/17/23            Current Outpatient Medications   Medication Sig    fluticasone propionate (FLONASE) 50 mcg/actuation nasal spray 2 sprays by Each Nostril route once daily.    imatinib (GLEEVEC) 100 MG Tab Take 6 tablets (600 mg total) by mouth once daily.   Last reviewed on 12/29/2022  3:46 PM by Lori Carter MA    Review of patient's allergies indicates:  No Known Allergies Last reviewed on  4/11/2023 8:11 AM by Delia Sams      Tasks added this encounter   No tasks added.   Tasks due within next 3 months   5/11/2023 - Refill Coordination Outreach (1 time occurrence)     MATTIE MENDOZA  PharmD  [facility]  [address]  [facility] [address]  Phone: [phone]  Fax: [phone]

## 2023-06-09 ENCOUNTER — SPECIALTY PHARMACY (OUTPATIENT)
Dept: PHARMACY | Facility: CLINIC | Age: 53
End: 2023-06-09
Payer: COMMERCIAL

## 2023-06-09 ENCOUNTER — PATIENT MESSAGE (OUTPATIENT)
Dept: PHARMACY | Facility: CLINIC | Age: 53
End: 2023-06-09
Payer: COMMERCIAL

## 2023-06-09 DIAGNOSIS — C92.10 CML (CHRONIC MYELOCYTIC LEUKEMIA): ICD-10-CM

## 2023-06-09 RX ORDER — IMATINIB MESYLATE 100 MG/1
600 TABLET, FILM COATED ORAL DAILY
Qty: 180 TABLET | Refills: 11 | Status: ACTIVE | OUTPATIENT
Start: 2023-06-09

## 2023-06-09 NOTE — TELEPHONE ENCOUNTER
Specialty Pharmacy - Refill Coordination    Specialty Medication Orders Linked to Encounter      Flowsheet Row Most Recent Value   Medication #1 imatinib (GLEEVEC) 100 MG Tab (Order#[id number], Rx#[id number])            Refill Questions - Documented Responses      Flowsheet Row Most Recent Value   Refill Screening Questions    Changes to allergies? No   Changes to medications? No   New conditions since last clinic visit? No   Unplanned office visit, urgent care, ED, or hospital admission in the last 4 weeks? No   How does patient/caregiver feel medication is working? Good   Financial problems or insurance changes? No   How many doses of your specialty medications were missed in the last 4 weeks? 0   Would patient like to speak to a pharmacist? No   When does the patient need to receive the medication? 06/16/23   Refill Delivery Questions    How will the patient receive the medication? MEDRx   When does the patient need to receive the medication? 06/16/23   Shipping Address Home   Address in [facility] confirmed and updated if neccessary? Yes   Expected Copay ($) 250   Is the patient able to afford the medication copay? Yes   Payment Method CC on file   Days supply of Refill 30   Supplies needed? No supplies needed   Refill activity completed? Yes   Refill activity plan Refill scheduled   Shipment/Pickup Date: 06/14/23            Current Outpatient Medications   Medication Sig    fluticasone propionate (FLONASE) 50 mcg/actuation nasal spray 2 sprays by Each Nostril route once daily.    imatinib (GLEEVEC) 100 MG Tab Take 6 tablets (600 mg total) by mouth once daily.   Last reviewed on 12/29/2022  3:46 PM by Lori Carter MA    Review of patient's allergies indicates:  No Known Allergies Last reviewed on  6/9/2023 9:21 AM by Beatrice Luque      Tasks added this encounter   No tasks added.   Tasks due within next 3 months   6/12/2023 - Refill Coordination Outreach (1 time occurrence)     Krupa Pena  PharmD  [facility]  [address]  [facility] [address]  Phone: [phone]  Fax: [phone]

## 2023-06-28 ENCOUNTER — OFFICE VISIT (OUTPATIENT)
Dept: HEMATOLOGY/ONCOLOGY | Facility: CLINIC | Age: 53
End: 2023-06-28
Payer: COMMERCIAL

## 2023-06-28 VITALS
DIASTOLIC BLOOD PRESSURE: 80 MMHG | HEART RATE: 64 BPM | SYSTOLIC BLOOD PRESSURE: 157 MMHG | RESPIRATION RATE: 16 BRPM | HEIGHT: 69 IN | TEMPERATURE: 98 F | WEIGHT: 213.31 LBS | OXYGEN SATURATION: 99 % | BODY MASS INDEX: 31.6 KG/M2

## 2023-06-28 DIAGNOSIS — D84.821 IMMUNODEFICIENCY DUE TO DRUG THERAPY: ICD-10-CM

## 2023-06-28 DIAGNOSIS — Z79.899 IMMUNODEFICIENCY DUE TO DRUG THERAPY: ICD-10-CM

## 2023-06-28 DIAGNOSIS — C92.10 CML (CHRONIC MYELOCYTIC LEUKEMIA): Primary | ICD-10-CM

## 2023-06-28 PROCEDURE — 99215 PR OFFICE/OUTPT VISIT, EST, LEVL V, 40-54 MIN: ICD-10-PCS | Mod: S$GLB,,, | Performed by: INTERNAL MEDICINE

## 2023-06-28 PROCEDURE — 3077F SYST BP >= 140 MM HG: CPT | Mod: CPTII,S$GLB,, | Performed by: INTERNAL MEDICINE

## 2023-06-28 PROCEDURE — 99215 OFFICE O/P EST HI 40 MIN: CPT | Mod: S$GLB,,, | Performed by: INTERNAL MEDICINE

## 2023-06-28 PROCEDURE — 3079F DIAST BP 80-89 MM HG: CPT | Mod: CPTII,S$GLB,, | Performed by: INTERNAL MEDICINE

## 2023-06-28 PROCEDURE — 1159F PR MEDICATION LIST DOCUMENTED IN MEDICAL RECORD: ICD-10-PCS | Mod: CPTII,S$GLB,, | Performed by: INTERNAL MEDICINE

## 2023-06-28 PROCEDURE — 99999 PR PBB SHADOW E&M-EST. PATIENT-LVL IV: ICD-10-PCS | Mod: PBBFAC,,, | Performed by: INTERNAL MEDICINE

## 2023-06-28 PROCEDURE — 1159F MED LIST DOCD IN RCRD: CPT | Mod: CPTII,S$GLB,, | Performed by: INTERNAL MEDICINE

## 2023-06-28 PROCEDURE — 3077F PR MOST RECENT SYSTOLIC BLOOD PRESSURE >= 140 MM HG: ICD-10-PCS | Mod: CPTII,S$GLB,, | Performed by: INTERNAL MEDICINE

## 2023-06-28 PROCEDURE — 3008F PR BODY MASS INDEX (BMI) DOCUMENTED: ICD-10-PCS | Mod: CPTII,S$GLB,, | Performed by: INTERNAL MEDICINE

## 2023-06-28 PROCEDURE — 3008F BODY MASS INDEX DOCD: CPT | Mod: CPTII,S$GLB,, | Performed by: INTERNAL MEDICINE

## 2023-06-28 PROCEDURE — 3079F PR MOST RECENT DIASTOLIC BLOOD PRESSURE 80-89 MM HG: ICD-10-PCS | Mod: CPTII,S$GLB,, | Performed by: INTERNAL MEDICINE

## 2023-06-28 PROCEDURE — 99999 PR PBB SHADOW E&M-EST. PATIENT-LVL IV: CPT | Mod: PBBFAC,,, | Performed by: INTERNAL MEDICINE

## 2023-06-28 NOTE — PROGRESS NOTES
Section of Hematology and Stem Cell Transplantation  Follow Up Note     Visit Date: 06/28/2023    Primary Oncologic Diagnosis: CML      History of Present Ilness:   Romeo Couch Jr. (Romeo) is a pleasant 53 y.o.male with history of CML diagnosed in 2019, on Imatinib therapy.     Interval History:   Mr. Couch continues to work hard in his job. No change in activity, appetite or energy. Remains very busy.    Recently went to [place] for 10 days to [place] and [facility], had a great time. Annual golf trip planned for early Fall. Attended the [employer] this weekend.      Oncology History Summary:   --Presented to [facility] [place]23[place]. He reported that he went to his PCP with left abdominal pain and temp of 100.4 the day before and was sent by PCP for CT. CT showed splenomegaly. Pt was instructed to go to ED. Went to [place] ED and was found to have leukocytosis. Wanted to transfer patient to [facility], but no beds were available. Patient reportedly left [facility] ED AMA and came to [facility] over night. WBC 238K. Suspicious for acute leukemia. He denies any aches, chills, n/v/d, constipation, chest pain, bleeding or bruising. C/o SOB on exertion and mild, non-productive cough. Patient states that he has lost 8 lbs in the last few weeks.  --Peripheral smear was reviewed in the ED which was highly suggestive of CML  --Bone marrow biopsy on 4/25/19: CHRONIC MYELOID LEUKEMIA, BCR-ABL1-POSITIVE, CHRONIC PHASE. FOCAL GRADE 2 RETICULAR FIBROSIS  --BCR-ABL P210 80%  --Began imatinib on Saturday May 11, 2019      Past Medical History, Social History, and Past Family History are unchanged since last evaluation except for HPI.     CURRENT MEDICATIONS:   Current Outpatient Medications   Medication Sig    imatinib (GLEEVEC) 100 MG Tab Take 6 tablets (600 mg total) by mouth once daily.    fluticasone propionate (FLONASE) 50 mcg/actuation nasal spray 2 sprays by Each Nostril route once daily.     Current Facility-Administered  Medications   Medication    acetaminophen tablet 650 mg    albuterol inhaler 2 puff    diphenhydrAMINE injection 25 mg    EPINEPHrine (EPIPEN) 0.3 mg/0.3 mL pen injection 0.3 mg    methylPREDNISolone sodium succinate injection 40 mg    ondansetron disintegrating tablet 4 mg    sodium chloride 0.9% 500 mL flush bag    sodium chloride 0.9% flush 10 mL       ALLERGIES:   Review of patient's allergies indicates:  No Known Allergies      Review of Systems:     Review of Systems   Constitutional:  Negative for fever, malaise/fatigue and weight loss.   Eyes:  Positive for discharge. Negative for double vision and photophobia.   Respiratory:  Negative for cough and shortness of breath.    Cardiovascular:  Negative for chest pain.   Gastrointestinal:  Negative for abdominal pain and diarrhea.   Genitourinary:  Negative for frequency.   Musculoskeletal:  Negative for back pain.   Skin:  Negative for rash.   Neurological:  Negative for dizziness, tremors, weakness and headaches.   Psychiatric/Behavioral:  The patient is not nervous/anxious.        Physical Exam:     Vitals:    06/28/23 0809   BP: (!) 157/80   Pulse: 64   Resp: 16   Temp: 98.4 °F (36.9 °C)     Physical Exam  Constitutional:       General: He is not in acute distress.     Appearance: He is well-developed. He is not diaphoretic.   HENT:      Head: Normocephalic and atraumatic.      Mouth/Throat:      Pharynx: No oropharyngeal exudate.   Eyes:      Conjunctiva/sclera: Conjunctivae normal.      Pupils: Pupils are equal, round, and reactive to light.   Neck:      Thyroid: No thyromegaly.      Vascular: No JVD.      Trachea: No tracheal deviation.   Cardiovascular:      Rate and Rhythm: Normal rate and regular rhythm.      Heart sounds: Normal heart sounds. No murmur heard.    No friction rub.   Pulmonary:      Effort: Pulmonary effort is normal. No respiratory distress.      Breath sounds: Normal breath sounds. No stridor. No wheezing or rales.   Chest:       Chest wall: No tenderness.   Abdominal:      General: Bowel sounds are normal. There is no distension.      Palpations: Abdomen is soft.      Tenderness: There is no abdominal tenderness. There is no guarding or rebound.   Musculoskeletal:         General: No tenderness or deformity. Normal range of motion.      Cervical back: Normal range of motion and neck supple.   Skin:     General: Skin is warm and dry.      Capillary Refill: Capillary refill takes less than 2 seconds.      Coloration: Skin is not pale.      Findings: No erythema or rash.   Neurological:      Mental Status: He is alert and oriented to person, place, and time.      Cranial Nerves: No cranial nerve deficit.      Sensory: No sensory deficit.      Motor: No abnormal muscle tone.      Coordination: Coordination normal.      Deep Tendon Reflexes: Reflexes normal.   Psychiatric:         Behavior: Behavior normal.         Thought Content: Thought content normal.         Judgment: Judgment normal.           ECOG Performance Status: (foot note - ECOG PS provided by Eastern Cooperative Oncology Group) 0 - Asymptomatic    Karnofsky Performance Score:  100%- Normal, No Complaints, No Evidence of Disease    Labs:   Lab Results   Component Value Date    WBC 5.48 06/21/2023    HGB 15.6 06/21/2023    HCT 45.5 06/21/2023    MCV 98 06/21/2023     06/21/2023       Lab Results   Component Value Date     06/21/2023    K 4.0 06/21/2023     06/21/2023    CO2 25 06/21/2023    BUN 16 06/21/2023    CREATININE 1.14 06/21/2023    ALBUMIN 4.2 06/21/2023    BILITOT 0.7 06/21/2023    ALKPHOS 50 06/21/2023    AST 44 06/21/2023    ALT 32 06/21/2023          Assessment and Plan:   Romeo Couch  (Romeo) is a pleasant 53 y.o.male with history of CML.    CML  --Diagnosed in 2019  --Began Imatinib 400mg daily on Sat May 11th, with a goal of of less that 10% BCR-ABL at 6 months  --Increased Imatinib to 600mg daily in November with a decrease in BCR-ABL from  60% to 28% to 19% to 10% to 0.2%   --Symptom management with calcium supplements and tonic water  --Will check labs q 3 months    2. Anemia  --Iniitially due to high CML burden, Imatinib  --Resolved and at baseline 15.6 today  --Asymptomatic, continue to monitor         Follow Up:      he will return to clinic in 3 months with labs 1 week earlier, but knows to call in the interim if symptoms change or should a problem arise.      BMT Chart Routing      Follow up with physician 3 months. 1. labs in 3 months: cbc,cmp, bcr-abl 2.see me 1 week after labs   Follow up with RAN    Provider visit type    Infusion scheduling note    Injection scheduling note    Labs    Imaging    Pharmacy appointment    Other referrals               Catalina Rucker MD  Bone Marrow Transplant Physician

## 2023-06-29 PROBLEM — Z79.899 IMMUNODEFICIENCY DUE TO DRUG THERAPY: Status: ACTIVE | Noted: 2023-06-29

## 2023-06-29 PROBLEM — D84.821 IMMUNODEFICIENCY DUE TO DRUG THERAPY: Status: ACTIVE | Noted: 2023-06-29

## 2023-07-03 NOTE — ED NOTES
Dr. Oh at the bedside.   Pulmonary/Critical Care Inpatient Progress Note    Interval History     No overnight events   Stable on room air   Denied chest pain, shortness of breath or cough       ROS:  As mentioned above    I/O this shift:  In: 150 [P.O.:150]  Out: 0   Recent Labs   Lab 07/03/23  0452 07/02/23  0730   WBC 6.3 9.2   RBC 2.73* 3.03*   HGB 9.9* 11.1*   HCT 30.2* 31.8*   .6* 105.0*   MCH 36.3* 36.6*   MCHC 32.8 34.9   * 141     Recent Labs   Lab 07/02/23  0730   CO2 26   BUN 29*   AST 35     Recent Labs   Lab 07/02/23  0730   PT 9.9   PTT 21*   INR 1.0        I reviewed all relevant chest x-rays and CT chest scans     Physical Exam     Visit Vitals  BP 92/66   Pulse (!) 107   Temp 97.5 °F (36.4 °C) (Oral)   Resp 18   Ht 5' 5\" (1.651 m)   Wt 77 kg (169 lb 12.1 oz)   SpO2 96%   BMI 28.25 kg/m²     Physical Exam:    General: no acute distress.   Neuro: no focal deficits   Head: atraumatic   Neck: supple   Resp: Clear to auscultate bilaterally, no wheezes or crackles   CV: regular rhythm rate, no audible murmur  Abd: soft, non-distended and non-tender   Ext: no clubbing or edema   Skin: no visible rashes    Assessment and Plan:     Assessment:     COVID-19 infection   -without hypoxia     History of pulmonary embolism in May 2023 on Apixaban   Ovarian cancer with metastases to brain     Plan:     Continue oxygen supplementation as needed keeping oxygen saturation >90%    Stable on room air   No need for steroids therapy   Fully anticoagulated on PO Apixaban 5 mg bid   Continue IV Cefazolin per ID team   Pulmonary hygiene   Aspiration precautions     Stable for discharge from the pulmonary standpoint   Pulmonary service will sign off   Please call with any questions         Lia Newby MD   [facility]

## 2023-07-07 ENCOUNTER — PATIENT MESSAGE (OUTPATIENT)
Dept: PHARMACY | Facility: CLINIC | Age: 53
End: 2023-07-07
Payer: COMMERCIAL

## 2023-07-10 ENCOUNTER — SPECIALTY PHARMACY (OUTPATIENT)
Dept: PHARMACY | Facility: CLINIC | Age: 53
End: 2023-07-10
Payer: COMMERCIAL

## 2023-07-10 NOTE — TELEPHONE ENCOUNTER
Specialty Pharmacy - Refill Coordination    Specialty Medication Orders Linked to Encounter      Flowsheet Row Most Recent Value   Medication #1 imatinib (GLEEVEC) 100 MG Tab (Order#[id number], Rx#[id number])            Refill Questions - Documented Responses      Flowsheet Row Most Recent Value   Patient Availability and HIPAA Verification    Does patient want to proceed with activity? Yes   HIPAA/medical authority confirmed? Yes   Relationship to patient of person spoken to? Self   Refill Screening Questions    Changes to allergies? No   Changes to medications? No   New conditions since last clinic visit? No   Unplanned office visit, urgent care, ED, or hospital admission in the last 4 weeks? No   How does patient/caregiver feel medication is working? Good   Financial problems or insurance changes? No   How many doses of your specialty medications were missed in the last 4 weeks? 0   Would patient like to speak to a pharmacist? No   When does the patient need to receive the medication? 07/16/23   Refill Delivery Questions    How will the patient receive the medication? MEDRx   When does the patient need to receive the medication? 07/16/23   Shipping Address Home   Address in [facility] confirmed and updated if neccessary? Yes   Expected Copay ($) 250   Payment Method zero copay   Days supply of Refill 30   Supplies needed? No supplies needed   Refill activity completed? Yes   Shipment/Pickup Date: 07/13/23            Current Outpatient Medications   Medication Sig    fluticasone propionate (FLONASE) 50 mcg/actuation nasal spray 2 sprays by Each Nostril route once daily.    imatinib (GLEEVEC) 100 MG Tab Take 6 tablets (600 mg total) by mouth once daily.   Last reviewed on 6/28/2023  8:11 AM by Olga Mcleod MA    Review of patient's allergies indicates:  No Known Allergies Last reviewed on  6/28/2023 8:10 AM by Olga Mcleod      Tasks added this encounter   No tasks added.   Tasks due within next 3 months    9/23/2023 - Clinical Assessment (6 month recurrence)  7/10/2023 - Refill Coordination Outreach (1 time occurrence)     [facility]  [address]  [facility] [address]  Phone: [phone]  Fax: [phone]

## 2023-08-07 ENCOUNTER — SPECIALTY PHARMACY (OUTPATIENT)
Dept: PHARMACY | Facility: CLINIC | Age: 53
End: 2023-08-07
Payer: COMMERCIAL

## 2023-08-07 NOTE — TELEPHONE ENCOUNTER
Specialty Pharmacy - Refill Coordination    Specialty Medication Orders Linked to Encounter      Flowsheet Row Most Recent Value   Medication #1 imatinib (GLEEVEC) 100 MG Tab (Order#[id number], Rx#[id number])            Refill Questions - Documented Responses      Flowsheet Row Most Recent Value   Patient Availability and HIPAA Verification    Does patient want to proceed with activity? Yes   HIPAA/medical authority confirmed? Yes   Relationship to patient of person spoken to? Self   Refill Screening Questions    Changes to allergies? No   Changes to medications? No   New conditions since last clinic visit? No   Unplanned office visit, urgent care, ED, or hospital admission in the last 4 weeks? No   How does patient/caregiver feel medication is working? Good   How many doses of your specialty medications were missed in the last 4 weeks? 0   Would patient like to speak to a pharmacist? No   When does the patient need to receive the medication? 08/14/23   Refill Delivery Questions    How will the patient receive the medication? MEDRx   When does the patient need to receive the medication? 08/14/23   Shipping Address Home   Address in [facility] confirmed and updated if neccessary? Yes   Expected Copay ($) 250   Is the patient able to afford the medication copay? Yes   Payment Method CC on file   Days supply of Refill 30   Supplies needed? No supplies needed   Refill activity completed? Yes   Refill activity plan Refill scheduled   Shipment/Pickup Date: 08/11/23            Current Outpatient Medications   Medication Sig    fluticasone propionate (FLONASE) 50 mcg/actuation nasal spray 2 sprays by Each Nostril route once daily.    imatinib (GLEEVEC) 100 MG Tab Take 6 tablets (600 mg total) by mouth once daily.   Last reviewed on 6/28/2023  8:11 AM by Olga Mcleod MA    Review of patient's allergies indicates:  No Known Allergies Last reviewed on  6/28/2023 8:10 AM by Olga Mcleod      Tasks added this encounter    No tasks added.   Tasks due within next 3 months   9/23/2023 - Clinical Assessment (6 month recurrence)     Angela Foss, Patient Care Assistant  [facility]  [address]  [facility] [address]  Phone: [phone]  Fax: [phone]

## 2023-09-05 ENCOUNTER — PATIENT MESSAGE (OUTPATIENT)
Dept: HEMATOLOGY/ONCOLOGY | Facility: CLINIC | Age: 53
End: 2023-09-05
Payer: COMMERCIAL

## 2023-09-05 DIAGNOSIS — K52.9 ENTERITIS: Primary | ICD-10-CM

## 2023-09-15 ENCOUNTER — OFFICE VISIT (OUTPATIENT)
Dept: GASTROENTEROLOGY | Facility: CLINIC | Age: 53
End: 2023-09-15
Payer: COMMERCIAL

## 2023-09-15 VITALS
HEART RATE: 74 BPM | DIASTOLIC BLOOD PRESSURE: 80 MMHG | SYSTOLIC BLOOD PRESSURE: 122 MMHG | OXYGEN SATURATION: 98 % | HEIGHT: 69 IN | BODY MASS INDEX: 29.22 KG/M2 | WEIGHT: 197.31 LBS

## 2023-09-15 DIAGNOSIS — Z12.11 SCREENING FOR MALIGNANT NEOPLASM OF COLON: ICD-10-CM

## 2023-09-15 DIAGNOSIS — K52.9 ENTERITIS: Primary | ICD-10-CM

## 2023-09-15 PROBLEM — R10.9 LEFT SIDED ABDOMINAL PAIN: Status: RESOLVED | Noted: 2019-04-24 | Resolved: 2023-09-15

## 2023-09-15 PROCEDURE — 3008F BODY MASS INDEX DOCD: CPT | Mod: CPTII,S$GLB,, | Performed by: NURSE PRACTITIONER

## 2023-09-15 PROCEDURE — 1159F PR MEDICATION LIST DOCUMENTED IN MEDICAL RECORD: ICD-10-PCS | Mod: CPTII,S$GLB,, | Performed by: NURSE PRACTITIONER

## 2023-09-15 PROCEDURE — 3074F PR MOST RECENT SYSTOLIC BLOOD PRESSURE < 130 MM HG: ICD-10-PCS | Mod: CPTII,S$GLB,, | Performed by: NURSE PRACTITIONER

## 2023-09-15 PROCEDURE — 3008F PR BODY MASS INDEX (BMI) DOCUMENTED: ICD-10-PCS | Mod: CPTII,S$GLB,, | Performed by: NURSE PRACTITIONER

## 2023-09-15 PROCEDURE — 3079F PR MOST RECENT DIASTOLIC BLOOD PRESSURE 80-89 MM HG: ICD-10-PCS | Mod: CPTII,S$GLB,, | Performed by: NURSE PRACTITIONER

## 2023-09-15 PROCEDURE — 1159F MED LIST DOCD IN RCRD: CPT | Mod: CPTII,S$GLB,, | Performed by: NURSE PRACTITIONER

## 2023-09-15 PROCEDURE — 99204 PR OFFICE/OUTPT VISIT, NEW, LEVL IV, 45-59 MIN: ICD-10-PCS | Mod: S$GLB,,, | Performed by: NURSE PRACTITIONER

## 2023-09-15 PROCEDURE — 99999 PR PBB SHADOW E&M-EST. PATIENT-LVL V: ICD-10-PCS | Mod: PBBFAC,,, | Performed by: NURSE PRACTITIONER

## 2023-09-15 PROCEDURE — 1160F RVW MEDS BY RX/DR IN RCRD: CPT | Mod: CPTII,S$GLB,, | Performed by: NURSE PRACTITIONER

## 2023-09-15 PROCEDURE — 99204 OFFICE O/P NEW MOD 45 MIN: CPT | Mod: S$GLB,,, | Performed by: NURSE PRACTITIONER

## 2023-09-15 PROCEDURE — 99999 PR PBB SHADOW E&M-EST. PATIENT-LVL V: CPT | Mod: PBBFAC,,, | Performed by: NURSE PRACTITIONER

## 2023-09-15 PROCEDURE — 3079F DIAST BP 80-89 MM HG: CPT | Mod: CPTII,S$GLB,, | Performed by: NURSE PRACTITIONER

## 2023-09-15 PROCEDURE — 1160F PR REVIEW ALL MEDS BY PRESCRIBER/CLIN PHARMACIST DOCUMENTED: ICD-10-PCS | Mod: CPTII,S$GLB,, | Performed by: NURSE PRACTITIONER

## 2023-09-15 PROCEDURE — 3074F SYST BP LT 130 MM HG: CPT | Mod: CPTII,S$GLB,, | Performed by: NURSE PRACTITIONER

## 2023-09-15 RX ORDER — SODIUM PICOSULFATE, MAGNESIUM OXIDE, AND ANHYDROUS CITRIC ACID 12; 3.5; 1 G/175ML; G/175ML; MG/175ML
1 LIQUID ORAL ONCE
Qty: 350 ML | Refills: 0 | Status: SHIPPED | OUTPATIENT
Start: 2023-09-15 | End: 2023-09-22

## 2023-09-15 NOTE — PROGRESS NOTES
Subjective:       Patient ID: Romeo Couch Jr. is a 53 y.o. male.    Chief Complaint: Abdominal Pain and Follow-up    52 y/o male with hx of CML diagnosed in 2019 on Gleevec therapy presents to clinic for hospital follow up. Patient was seen in ED last week for abdominal pain, n/v, and diarrhea. Was unable to tolerate food by mouth. Reports ~15 lbs weight loss. Symptoms started at least 5 days prior to hospital visit. CT abd w/o contrast negative for any acute findings. Does recall eating rare to medium rare steak prior to onset of symptoms. Stool studies pending. Symptoms have resolved since ED visit. No diarrhea in 7 days. Appetite has improved. Tolerating regular diet. No nausea or vomiting. Similar episode last year. CT abd revealed inflammatory changes within the small bowel consistent with small bowel enteritis. Has never had an upper or lower endoscopy. No FH IBD or colon cancer.         Past Medical History:   Diagnosis Date    Leukocytosis     Pneumothorax     29 yo       Past Surgical History:   Procedure Laterality Date    BONE MARROW BIOPSY Left 4/25/2019    Procedure: Biopsy-bone marrow;  Surgeon: Catalina Rucker MD;  Location: [facility] OR [address] [place] [address];  Service: Orthopedics;  Laterality: Left;    right shoulder surgery      right rotator cuff       Family History   Problem Relation Age of Onset    Cancer Mother         Ovarian     Heart disease Neg Hx        Social History     Socioeconomic History    Marital status:    Tobacco Use    Smoking status: Never    Smokeless tobacco: Current     Types: Snuff   Substance and Sexual Activity    Alcohol use: Yes     Comment: occasionally on wkends    Drug use: No    Sexual activity: Yes     Partners: Female   Social History Narrative    Lives in [place] previously in [place]. Lives with his wife and 3 children. One of his children graduated from [employer]. 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           Social Determinants of Health     Financial Resource Strain: Low Risk  (3/12/2023)    Overall Financial Resource Strain (CARDIA)     Difficulty of Paying Living Expenses: Not hard at all   Food Insecurity: No Food Insecurity (3/12/2023)    Hunger Vital Sign     Worried About Running Out of Food in the Last Year: Never true     Ran Out of Food in the Last Year: Never true   Transportation Needs: No Transportation Needs (3/12/2023)    PRAPARE - Transportation     Lack of Transportation (Medical): No     Lack of Transportation (Non-Medical): No   Physical Activity: Inactive (3/12/2023)    Exercise Vital Sign     Days of Exercise per Week: 5 days     Minutes of Exercise per Session: 0 min   Stress: No Stress Concern Present (3/12/2023)    [place] [employer] - Occupational Stress Questionnaire     Feeling of Stress : Only a little   Social Connections: Unknown (3/12/2023)    Social Connection and Isolation Panel [NHANES]     Frequency of Communication with Friends and Family: More than three times a week     Frequency of Social Gatherings with Friends and Family: More than three times a week     Active Member of Clubs or Organizations: Patient refused     Attends Club or Organization Meetings: Patient refused     Marital Status:    Housing Stability: Low Risk  (3/12/2023)    Housing Stability Vital Sign     Unable to Pay for Housing in the Last Year: No     Number of Places Lived in the Last Year: 1     Unstable Housing in the Last Year: No       Review of Systems   Constitutional:  Positive for unexpected weight change. Negative for appetite change.   HENT:  Negative for trouble swallowing.    Respiratory:  Negative for shortness of breath.    Cardiovascular:  Negative for chest pain.   Gastrointestinal:  Negative for abdominal pain and diarrhea.   Psychiatric/Behavioral:  Negative for dysphoric mood.          Objective:     Vitals:    09/15/23 1308   BP: 122/80   BP Location: Right  "arm   Patient Position: Sitting   BP Method: Large (Manual)   Pulse: 74   SpO2: 98%   Weight: 89.5 kg (197 lb 5 oz)   Height: 5' 9" (1.753 m)     Lab Results   Component Value Date    WBC 6.21 09/07/2023    HGB 14.5 09/07/2023    HCT 40.0 09/07/2023    MCV 96 09/07/2023     09/07/2023     BMP  Lab Results   Component Value Date     09/07/2023    K 3.0 (L) 09/07/2023    CL 94 (L) 09/07/2023    CO2 30 (H) 09/07/2023    BUN 16 09/07/2023    CREATININE 1.54 (H) 09/07/2023    CALCIUM 9.7 09/07/2023    ANIONGAP 14 09/07/2023    EGFRNORACEVR 52.9 (A) 09/07/2023        Physical Exam  Constitutional:       General: He is not in acute distress.     Appearance: Normal appearance.   HENT:      Head: Normocephalic.   Eyes:      Conjunctiva/sclera: Conjunctivae normal.   Pulmonary:      Effort: Pulmonary effort is normal. No respiratory distress.   Musculoskeletal:         General: Normal range of motion.      Cervical back: Normal range of motion.   Skin:     General: Skin is warm and dry.   Neurological:      Mental Status: He is alert and oriented to person, place, and time.   Psychiatric:         Mood and Affect: Mood normal.         Behavior: Behavior normal.               Assessment:         ICD-10-CM ICD-9-CM   1. Enteritis  K52.9 558.9   2. Screening for malignant neoplasm of colon  Z12.11 V76.51       Plan:       Enteritis  -     Ambulatory referral/consult to Gastroenterology  -     Case Request Endoscopy: EGD (ESOPHAGOGASTRODUODENOSCOPY), COLONOSCOPY    Screening for malignant neoplasm of colon  -     Case Request Endoscopy: EGD (ESOPHAGOGASTRODUODENOSCOPY), COLONOSCOPY  -     sod picosulf-mag ox-citric ac (CLENPIQ) 10 mg-3.5 gram- 12 gram/175 mL Soln; Take 1 Package by mouth once. for 1 dose  Dispense: 350 mL; Refill: 0      Follow up if symptoms worsen or fail to improve.     Patient's Medications   New Prescriptions    SOD PICOSULF-MAG OX-CITRIC AC (CLENPIQ) 10 MG-3.5 GRAM- 12 GRAM/175 ML SOLN    Take 1 " Package by mouth once. for 1 dose   Previous Medications    DICYCLOMINE (BENTYL) 20 MG TABLET    Take 1 tablet (20 mg total) by mouth 3 (three) times daily.    FLUTICASONE PROPIONATE (FLONASE) 50 MCG/ACTUATION NASAL SPRAY    2 sprays by Each Nostril route once daily.    IMATINIB (GLEEVEC) 100 MG TAB    Take 6 tablets (600 mg total) by mouth once daily.    POTASSIUM CHLORIDE SA (K-DUR,KLOR-CON) 20 MEQ TABLET    Take 1 tablet (20 mEq total) by mouth once daily.   Modified Medications    No medications on file   Discontinued Medications    No medications on file

## 2023-09-15 NOTE — PATIENT INSTRUCTIONS
CLENPIQ Instructions    You are scheduled for a EGD/colonoscopy with Dr. Newton on 9/29/2023 at [facility].  To ensure that your test is accurate and complete, you MUST follow these instructions listed below.  If you have any questions, please call our office at [phone].  Plan on being at the hospital for your procedure for 3-4 hours. Please contact the office at [phone] two days prior to procedure date if reschedule is needed.    1.  Follow a CLEAR LIQUID DIET for the entire day before your scheduled colonoscopy.  This means no solid food the entire day starting when you wake.  You may have as much of the clear liquids as you want throughout the day.   CLEAR LIQUID DIET:   - Avoid Red, Orange, Purple, and/or Blue food coloring   - NO DAIRY   - You can have:  Coffee with sugar (no creamer), tea, water, soda, apple or white grape juice, chicken or beef broth/bouillon (no meat, noodles, or veggies), green/yellow popsicles, green/yellow Jell-O, lemonade.    2.  AT 5 pm the evening before your colonoscopy, OPEN ONE (1) BOTTLE OF CLENPIQ AND DRINK THE ENTIRE BOTTLE.  DRINK FIVE (5) 8-OUNCE GLASSES OF WATER (40 OUNCES TOTAL) OVER THE NEXT FIVE (5) HOURS.     3.  The endoscopy department will call you 1 day before your colonoscopy to tell you the exact time to arrive, AND to tell you the exact time to drink the 2nd portion of your prep (which will be FIVE HOURS BEFORE YOUR ARRIVAL TIME).  At this time given to you, OPEN THE OTHER ONE (1) BOTTLE OF CLENPIQ AND DRINK THE ENTIRE BOTTLE.  DRINK THREE (3) 8-OUNCE GLASSES OF WATER (24 OUNCES TOTAL) OVER THE NEXT THREE (3) HOURS. Once this is complete, you can not have anything else by mouth!    4.  You must have someone with you to DRIVE YOU HOME since you will be receiving IV sedation for the colonoscopy.    5.  It is ok to take MOST of your REGULAR MEDICATIONS  in the morning of your test with a SIP of water.  THE ONLY MEDS YOU NEED TO HOLD ARE YOUR  DIABETES MEDICATIONS,  SOME BLOOD PRESSURE MEDS, AND BLOOD THINNERS IF OK'D BY YOUR DOCTOR.  Do NOT have anything else to eat or drink the morning of your colonoscopy.  It is ok to brush your teeth.    6.  If you are on blood thinners THAT YOU HAVE BEEN INSTRUCTED TO HOLD BY YOUR DOCTOR FOR THIS PROCEDURE, then do NOT take this the morning of your colonoscopy.  Do NOT stop these medications on your own, they must be approved to be held by your doctor.  Your colonoscopy can NOT be done if you are on these medications.  Examples of blood thinners include: Coumadin, Aggrenox, Plavix, Pradaxa, Reapro, Pletal, Xarelto, Ticagrelor, Brilinta, Eliquis, and high dose aspirin (325 mg).  You do not have to stop baby aspirin 81 mg.    7.  IF YOU ARE DIABETIC:  NO INSULIN OR ORAL MEDICATIONS THE MORNING OF THE COLONOSCOPY.  TAKE ONLY HALF THE DOSE OF YOUR INSULIN THE DAY BEFORE THE COLONOSCOPY.  DO NOT TAKE ANY ORAL DIABETIC MEDICATIONS THE DAY BEFORE THE COLONOSCOPY.  IF YOU ARE AN INSULIN DEPENDENT DIABETIC WITH UNSTABLE BLOOD SUGARS, NOTIFY YOUR PRIMARY CARE PHYSICIAN FOR INSTRUCTIONS.    8.  Please DO use your inhalers the morning of your procedure.

## 2023-09-20 ENCOUNTER — PATIENT MESSAGE (OUTPATIENT)
Dept: GASTROENTEROLOGY | Facility: CLINIC | Age: 53
End: 2023-09-20
Payer: COMMERCIAL

## 2023-10-03 ENCOUNTER — PATIENT MESSAGE (OUTPATIENT)
Dept: ADMINISTRATIVE | Facility: HOSPITAL | Age: 53
End: 2023-10-03
Payer: COMMERCIAL

## 2023-10-12 ENCOUNTER — OFFICE VISIT (OUTPATIENT)
Dept: HEMATOLOGY/ONCOLOGY | Facility: CLINIC | Age: 53
End: 2023-10-12
Payer: COMMERCIAL

## 2023-10-12 VITALS
SYSTOLIC BLOOD PRESSURE: 166 MMHG | WEIGHT: 201.94 LBS | BODY MASS INDEX: 29.91 KG/M2 | HEART RATE: 66 BPM | TEMPERATURE: 99 F | HEIGHT: 69 IN | DIASTOLIC BLOOD PRESSURE: 83 MMHG | OXYGEN SATURATION: 100 %

## 2023-10-12 DIAGNOSIS — D63.0 ANEMIA IN NEOPLASTIC DISEASE: ICD-10-CM

## 2023-10-12 DIAGNOSIS — C92.10 CML (CHRONIC MYELOCYTIC LEUKEMIA): Primary | ICD-10-CM

## 2023-10-12 PROCEDURE — 3008F BODY MASS INDEX DOCD: CPT | Mod: CPTII,S$GLB,, | Performed by: INTERNAL MEDICINE

## 2023-10-12 PROCEDURE — 3077F SYST BP >= 140 MM HG: CPT | Mod: CPTII,S$GLB,, | Performed by: INTERNAL MEDICINE

## 2023-10-12 PROCEDURE — 3008F PR BODY MASS INDEX (BMI) DOCUMENTED: ICD-10-PCS | Mod: CPTII,S$GLB,, | Performed by: INTERNAL MEDICINE

## 2023-10-12 PROCEDURE — 1159F PR MEDICATION LIST DOCUMENTED IN MEDICAL RECORD: ICD-10-PCS | Mod: CPTII,S$GLB,, | Performed by: INTERNAL MEDICINE

## 2023-10-12 PROCEDURE — 3079F PR MOST RECENT DIASTOLIC BLOOD PRESSURE 80-89 MM HG: ICD-10-PCS | Mod: CPTII,S$GLB,, | Performed by: INTERNAL MEDICINE

## 2023-10-12 PROCEDURE — 99215 PR OFFICE/OUTPT VISIT, EST, LEVL V, 40-54 MIN: ICD-10-PCS | Mod: S$GLB,,, | Performed by: INTERNAL MEDICINE

## 2023-10-12 PROCEDURE — 99215 OFFICE O/P EST HI 40 MIN: CPT | Mod: S$GLB,,, | Performed by: INTERNAL MEDICINE

## 2023-10-12 PROCEDURE — 99999 PR PBB SHADOW E&M-EST. PATIENT-LVL III: ICD-10-PCS | Mod: PBBFAC,,, | Performed by: INTERNAL MEDICINE

## 2023-10-12 PROCEDURE — 99999 PR PBB SHADOW E&M-EST. PATIENT-LVL III: CPT | Mod: PBBFAC,,, | Performed by: INTERNAL MEDICINE

## 2023-10-12 PROCEDURE — 1159F MED LIST DOCD IN RCRD: CPT | Mod: CPTII,S$GLB,, | Performed by: INTERNAL MEDICINE

## 2023-10-12 PROCEDURE — 3077F PR MOST RECENT SYSTOLIC BLOOD PRESSURE >= 140 MM HG: ICD-10-PCS | Mod: CPTII,S$GLB,, | Performed by: INTERNAL MEDICINE

## 2023-10-12 PROCEDURE — 3079F DIAST BP 80-89 MM HG: CPT | Mod: CPTII,S$GLB,, | Performed by: INTERNAL MEDICINE

## 2023-10-12 NOTE — PROGRESS NOTES
Section of Hematology and Stem Cell Transplantation  Follow Up Note     Visit Date: 10/12/2023    Primary Oncologic Diagnosis: CML      History of Present Ilness:   Romeo Couch Jr. (Romeo) is a pleasant 53 y.o.male with history of CML diagnosed in 2019, on Imatinib therapy.     Interval History:   Mr. Couch continues to work hard in his job. No change in activity, appetite or energy. Remains very busy. Spleen area has not hurt in a long time.     Played in a golf ternament in [place]. Lost a ton of weight recently [14 pounds] due to not eating with diverticulitis while at the [facility]. Has not regained the weight.    Colonoscopy and EGD planned for tomorrow at our [facility].       Oncology History Summary:   --Presented to [facility] [place] on 4/23/19. He reported that he went to his PCP with left abdominal pain and temp of 100.4 the day before and was sent by PCP for CT. CT showed splenomegaly. Pt was instructed to go to ED. Went to [place] ED and was found to have leukocytosis. Wanted to transfer patient to [facility], but no beds were available. Patient reportedly left [place] ED AMA and came to [facility] ED over night. WBC 238K. Suspicious for acute leukemia. He denies any aches, chills, n/v/d, constipation, chest pain, bleeding or bruising. C/o SOB on exertion and mild, non-productive cough. Patient states that he has lost 8 lbs in the last few weeks.  --Peripheral smear was reviewed in the ED which was highly suggestive of CML  --Bone marrow biopsy on 4/25/19: CHRONIC MYELOID LEUKEMIA, BCR-ABL1-POSITIVE, CHRONIC PHASE. FOCAL GRADE 2 RETICULAR FIBROSIS  --BCR-ABL P210 80%  --Began imatinib on Saturday May 11, 2019      Past Medical History, Social History, and Past Family History are unchanged since last evaluation except for HPI.     CURRENT MEDICATIONS:   Current Outpatient Medications   Medication Sig    imatinib (GLEEVEC) 100 MG Tab Take 6 tablets (600 mg total) by mouth once daily.    potassium  chloride SA (K-DUR,KLOR-CON) 20 MEQ tablet Take 1 tablet (20 mEq total) by mouth once daily.    dicyclomine (BENTYL) 20 mg tablet Take 1 tablet (20 mg total) by mouth 3 (three) times daily. (Patient not taking: Reported on 10/12/2023)    fluticasone propionate (FLONASE) 50 mcg/actuation nasal spray 2 sprays by Each Nostril route once daily.     Current Facility-Administered Medications   Medication    acetaminophen tablet 650 mg    albuterol inhaler 2 puff    diphenhydrAMINE injection 25 mg    EPINEPHrine (EPIPEN) 0.3 mg/0.3 mL pen injection 0.3 mg    methylPREDNISolone sodium succinate injection 40 mg    ondansetron disintegrating tablet 4 mg    sodium chloride 0.9% 500 mL flush bag    sodium chloride 0.9% flush 10 mL       ALLERGIES:   Review of patient's allergies indicates:  No Known Allergies      Review of Systems:     Review of Systems   Constitutional:  Negative for fever, malaise/fatigue and weight loss.   Eyes:  Negative for double vision, photophobia and discharge.   Respiratory:  Negative for cough and shortness of breath.    Cardiovascular:  Negative for chest pain.   Gastrointestinal:  Negative for abdominal pain and diarrhea.   Genitourinary:  Negative for frequency.   Musculoskeletal:  Negative for back pain.   Skin:  Negative for rash.   Neurological:  Negative for dizziness, tremors, weakness and headaches.   Psychiatric/Behavioral:  The patient is not nervous/anxious.          Physical Exam:     Vitals:    10/12/23 1603   BP: (!) 166/83   Pulse: 66   Temp: 98.5 °F (36.9 °C)     Physical Exam  Constitutional:       General: He is not in acute distress.     Appearance: He is well-developed. He is not diaphoretic.   HENT:      Head: Normocephalic and atraumatic.      Mouth/Throat:      Pharynx: No oropharyngeal exudate.   Eyes:      Conjunctiva/sclera: Conjunctivae normal.      Pupils: Pupils are equal, round, and reactive to light.   Neck:      Thyroid: No thyromegaly.      Vascular: No  JVD.      Trachea: No tracheal deviation.   Cardiovascular:      Rate and Rhythm: Normal rate and regular rhythm.      Heart sounds: Normal heart sounds. No murmur heard.     No friction rub.   Pulmonary:      Effort: Pulmonary effort is normal. No respiratory distress.      Breath sounds: Normal breath sounds. No stridor. No wheezing or rales.   Chest:      Chest wall: No tenderness.   Abdominal:      General: Bowel sounds are normal. There is no distension.      Palpations: Abdomen is soft.      Tenderness: There is no abdominal tenderness. There is no guarding or rebound.   Musculoskeletal:         General: No tenderness or deformity. Normal range of motion.      Cervical back: Normal range of motion and neck supple.   Skin:     General: Skin is warm and dry.      Capillary Refill: Capillary refill takes less than 2 seconds.      Coloration: Skin is not pale.      Findings: No erythema or rash.   Neurological:      Mental Status: He is alert and oriented to person, place, and time.      Cranial Nerves: No cranial nerve deficit.      Sensory: No sensory deficit.      Motor: No abnormal muscle tone.      Coordination: Coordination normal.      Deep Tendon Reflexes: Reflexes normal.   Psychiatric:         Behavior: Behavior normal.         Thought Content: Thought content normal.         Judgment: Judgment normal.       ECOG Performance Status: (foot note - ECOG PS provided by Eastern Cooperative Oncology Group) 0 - Asymptomatic    Karnofsky Performance Score:  100%- Normal, No Complaints, No Evidence of Disease    Labs:   Lab Results   Component Value Date    WBC 4.65 10/02/2023    HGB 13.7 (L) 10/02/2023    HCT 40.5 10/02/2023     (H) 10/02/2023     10/02/2023       Lab Results   Component Value Date     10/02/2023    K 4.0 10/02/2023     10/02/2023    CO2 28 10/02/2023    BUN 15 10/02/2023    CREATININE 1.10 10/02/2023    ALBUMIN 3.8 10/02/2023    BILITOT 0.5 10/02/2023    ALKPHOS 43  10/02/2023    AST 33 10/02/2023    ALT 24 10/02/2023     Positive. BCR/ABL1 p210 mRNA transcripts were detected and   estimated to represent 0.06% of total ABL1        Assessment and Plan:   Romeo Couch Jr. (Romeo) is a pleasant 53 y.o.male with history of CML.    CML  --Diagnosed in 2019  --Began Imatinib 400mg daily on Sat May 11th, with a goal of of less that 10% BCR-ABL at 6 months  --Increased Imatinib to 600mg daily in November 2021 with a decrease in BCR-ABL from 60% to 28% to 19% to 10% to 0.2%   --Currently 0.06%  --Will check labs q 3 months    2. Diverticulitis  --EGD and Colonoscopy planned  --Will defer management to GI         Follow Up:      he will return to clinic in 3 months with labs 1 week earlier, but knows to call in the interim if symptoms change or should a problem arise.      BMT Chart Routing      Follow up with physician 3 months. 1. labs in 3 months: cbc,cmp, bcr-abl p210 at [facility] 2.see me in person 1 week after labs   Follow up with RAN    Provider visit type    Infusion scheduling note    Injection scheduling note    Labs    Imaging    Pharmacy appointment    Other referrals                   Catalina Rucker MD  Bone Marrow Transplant Physician

## 2023-10-26 ENCOUNTER — TELEPHONE (OUTPATIENT)
Dept: GASTROENTEROLOGY | Facility: CLINIC | Age: 53
End: 2023-10-26
Payer: COMMERCIAL

## 2023-10-26 NOTE — TELEPHONE ENCOUNTER
Tried calling pt for the 3rd time to inform him of H Pylori dx. Pt did not answer. Spoke with pt's wife Anna and informed her of pt's dx. Pt's wife stated that he received his antibiotics and have started taking them. Informed pt's wife to make sure he takes those medications for 14 days and take the omeprazole twice daily for 14 days. Pt's wife verbalized understanding.

## 2023-11-21 ENCOUNTER — PATIENT MESSAGE (OUTPATIENT)
Dept: ADMINISTRATIVE | Facility: HOSPITAL | Age: 53
End: 2023-11-21
Payer: COMMERCIAL

## 2023-12-07 ENCOUNTER — PATIENT MESSAGE (OUTPATIENT)
Dept: ADMINISTRATIVE | Facility: OTHER | Age: 53
End: 2023-12-07
Payer: COMMERCIAL

## 2023-12-14 ENCOUNTER — PATIENT MESSAGE (OUTPATIENT)
Dept: ADMINISTRATIVE | Facility: HOSPITAL | Age: 53
End: 2023-12-14
Payer: COMMERCIAL

## 2023-12-14 ENCOUNTER — PATIENT OUTREACH (OUTPATIENT)
Dept: ADMINISTRATIVE | Facility: HOSPITAL | Age: 53
End: 2023-12-14
Payer: COMMERCIAL

## 2023-12-14 NOTE — PROGRESS NOTES
Updates were requested from care everywhere.  Health Maintenance has been updated.  [place] immunization registry triggered.  Immunizations were reconciled.  Telephone outreach to update PCP. Left voice mail, portal message sent.

## 2024-02-14 ENCOUNTER — PATIENT MESSAGE (OUTPATIENT)
Dept: HEMATOLOGY/ONCOLOGY | Facility: CLINIC | Age: 54
End: 2024-02-14
Payer: COMMERCIAL

## 2024-03-04 ENCOUNTER — PATIENT MESSAGE (OUTPATIENT)
Dept: ADMINISTRATIVE | Facility: OTHER | Age: 54
End: 2024-03-04
Payer: COMMERCIAL

## 2024-03-04 ENCOUNTER — OFFICE VISIT (OUTPATIENT)
Dept: HEMATOLOGY/ONCOLOGY | Facility: CLINIC | Age: 54
End: 2024-03-04
Payer: COMMERCIAL

## 2024-03-04 VITALS
DIASTOLIC BLOOD PRESSURE: 83 MMHG | HEIGHT: 69 IN | HEART RATE: 73 BPM | SYSTOLIC BLOOD PRESSURE: 148 MMHG | BODY MASS INDEX: 30.89 KG/M2 | OXYGEN SATURATION: 99 % | WEIGHT: 208.56 LBS | TEMPERATURE: 99 F

## 2024-03-04 DIAGNOSIS — D84.821 IMMUNODEFICIENCY DUE TO DRUG THERAPY: ICD-10-CM

## 2024-03-04 DIAGNOSIS — C92.10 CML (CHRONIC MYELOCYTIC LEUKEMIA): Primary | ICD-10-CM

## 2024-03-04 DIAGNOSIS — Z79.899 IMMUNODEFICIENCY DUE TO DRUG THERAPY: ICD-10-CM

## 2024-03-04 DIAGNOSIS — R03.0 ELEVATED BLOOD PRESSURE READING: ICD-10-CM

## 2024-03-04 PROCEDURE — 99215 OFFICE O/P EST HI 40 MIN: CPT | Mod: S$GLB,,, | Performed by: INTERNAL MEDICINE

## 2024-03-04 PROCEDURE — 3079F DIAST BP 80-89 MM HG: CPT | Mod: CPTII,S$GLB,, | Performed by: INTERNAL MEDICINE

## 2024-03-04 PROCEDURE — 1159F MED LIST DOCD IN RCRD: CPT | Mod: CPTII,S$GLB,, | Performed by: INTERNAL MEDICINE

## 2024-03-04 PROCEDURE — 3008F BODY MASS INDEX DOCD: CPT | Mod: CPTII,S$GLB,, | Performed by: INTERNAL MEDICINE

## 2024-03-04 PROCEDURE — 3077F SYST BP >= 140 MM HG: CPT | Mod: CPTII,S$GLB,, | Performed by: INTERNAL MEDICINE

## 2024-03-04 PROCEDURE — 99999 PR PBB SHADOW E&M-EST. PATIENT-LVL IV: CPT | Mod: PBBFAC,,, | Performed by: INTERNAL MEDICINE

## 2024-03-04 NOTE — PROGRESS NOTES
Section of Hematology and Stem Cell Transplantation  Follow Up Note     Visit Date: 03/04/2024    Primary Oncologic Diagnosis: CML      History of Present Ilness:   Romeo Couch Jr. (Romeo) is a pleasant 54 y.o.male with history of CML diagnosed in 2019, on Imatinib therapy.     Interval History:   Mr. Couch continues to work hard in his job. No change in activity, appetite or energy. Remains very busy. Spleen area has not hurt in a long time.     No further diverticulitis.    Happy to hear about the stability of his BCR-ABL.    Oncology History Summary:   --Presented to [facility] ED on 4/23/19. He reported that he went to his PCP with left abdominal pain and temp of 100.4 the day before and was sent by PCP for CT. CT showed splenomegaly. Pt was instructed to go to ED. Went to [place] ED and was found to have leukocytosis. Wanted to transfer patient to [facility], but no beds were available. Patient reportedly left [place] ED AMA and came to [facility] ED over night. WBC 238K. Suspicious for acute leukemia. He denies any aches, chills, n/v/d, constipation, chest pain, bleeding or bruising. C/o SOB on exertion and mild, non-productive cough. Patient states that he has lost 8 lbs in the last few weeks.  --Peripheral smear was reviewed in the ED which was highly suggestive of CML  --Bone marrow biopsy on 4/25/19: CHRONIC MYELOID LEUKEMIA, BCR-ABL1-POSITIVE, CHRONIC PHASE. FOCAL GRADE 2 RETICULAR FIBROSIS  --BCR-ABL P210 80%  --Began imatinib on Saturday May 11, 2019      Past Medical History, Social History, and Past Family History are unchanged since last evaluation except for HPI.     CURRENT MEDICATIONS:   Current Outpatient Medications   Medication Sig    fluticasone propionate (FLONASE) 50 mcg/actuation nasal spray 2 sprays by Each Nostril route once daily.    imatinib (GLEEVEC) 100 MG Tab Take 6 tablets (600 mg total) by mouth once daily.    potassium chloride SA (K-DUR,KLOR-CON) 20 MEQ tablet Take 1 tablet (20 mEq  total) by mouth once daily.    amoxicillin (AMOXIL) 500 MG capsule Take 2 capsules 2 times per day for 14 days.  Take all 4 medications together (Patient not taking: Reported on 3/4/2024)    dicyclomine (BENTYL) 20 mg tablet Take 1 tablet (20 mg total) by mouth 3 (three) times daily. (Patient not taking: Reported on 3/4/2024)    omeprazole (PRILOSEC) 40 MG capsule Take 1 capsule (40 mg total) by mouth 2 (two) times daily before meals. Take all 4 medications together for 14 days (Patient not taking: Reported on 3/4/2024)     Current Facility-Administered Medications   Medication    acetaminophen tablet 650 mg    albuterol inhaler 2 puff    diphenhydrAMINE injection 25 mg    EPINEPHrine (EPIPEN) 0.3 mg/0.3 mL pen injection 0.3 mg    methylPREDNISolone sodium succinate injection 40 mg    ondansetron disintegrating tablet 4 mg    sodium chloride 0.9% 500 mL flush bag    sodium chloride 0.9% flush 10 mL       ALLERGIES:   Review of patient's allergies indicates:  No Known Allergies      Review of Systems:     Review of Systems   Constitutional:  Negative for fever, malaise/fatigue and weight loss.   Eyes:  Negative for double vision, photophobia and discharge.   Respiratory:  Negative for cough and shortness of breath.    Cardiovascular:  Negative for chest pain.   Gastrointestinal:  Negative for abdominal pain and diarrhea.   Genitourinary:  Negative for frequency.   Musculoskeletal:  Negative for back pain.   Skin:  Negative for rash.   Neurological:  Negative for dizziness, tremors, weakness and headaches.   Psychiatric/Behavioral:  The patient is not nervous/anxious.          Physical Exam:     Vitals:    03/04/24 1012   BP: (!) 148/83   Pulse: 73   Temp: 98.5 °F (36.9 °C)     Physical Exam  Constitutional:       General: He is not in acute distress.     Appearance: He is well-developed. He is not diaphoretic.   HENT:      Head: Normocephalic and atraumatic.      Mouth/Throat:      Pharynx: No oropharyngeal exudate.    Eyes:      Conjunctiva/sclera: Conjunctivae normal.      Pupils: Pupils are equal, round, and reactive to light.   Neck:      Thyroid: No thyromegaly.      Vascular: No JVD.      Trachea: No tracheal deviation.   Cardiovascular:      Rate and Rhythm: Normal rate and regular rhythm.      Heart sounds: Normal heart sounds. No murmur heard.     No friction rub.   Pulmonary:      Effort: Pulmonary effort is normal. No respiratory distress.      Breath sounds: Normal breath sounds. No stridor. No wheezing or rales.   Chest:      Chest wall: No tenderness.   Abdominal:      General: Bowel sounds are normal. There is no distension.      Palpations: Abdomen is soft.      Tenderness: There is no abdominal tenderness. There is no guarding or rebound.   Musculoskeletal:         General: No tenderness or deformity. Normal range of motion.      Cervical back: Normal range of motion and neck supple.   Skin:     General: Skin is warm and dry.      Capillary Refill: Capillary refill takes less than 2 seconds.      Coloration: Skin is not pale.      Findings: No erythema or rash.   Neurological:      Mental Status: He is alert and oriented to person, place, and time.      Cranial Nerves: No cranial nerve deficit.      Sensory: No sensory deficit.      Motor: No abnormal muscle tone.      Coordination: Coordination normal.      Deep Tendon Reflexes: Reflexes normal.   Psychiatric:         Behavior: Behavior normal.         Thought Content: Thought content normal.         Judgment: Judgment normal.         ECOG Performance Status: (foot note - ECOG PS provided by Eastern Cooperative Oncology Group) 0 - Asymptomatic    Karnofsky Performance Score:  100%- Normal, No Complaints, No Evidence of Disease    Labs:   Lab Results   Component Value Date    WBC 4.69 02/06/2024    HGB 13.5 (L) 02/06/2024    HCT 39.7 (L) 02/06/2024    MCV 99 (H) 02/06/2024     02/06/2024       Lab Results   Component Value Date     02/06/2024    K  4.0 02/06/2024     02/06/2024    CO2 29 02/06/2024    BUN 15 02/06/2024    CREATININE 1.31 02/06/2024    ALBUMIN 4.2 02/06/2024    BILITOT 0.6 02/06/2024    ALKPHOS 50 02/06/2024    AST 47 (H) 02/06/2024    ALT 34 02/06/2024     Positive. BCR/ABL1 p210 mRNA transcripts were detected and   estimated to represent 0.06% of total ABL1        Assessment and Plan:   Romeo Couch Jr. (Romeo) is a pleasant 54 y.o.male with history of CML.    CML  --Diagnosed in 2019  --Began Imatinib 400mg daily on Sat May 11th, with a goal of of less that 10% BCR-ABL at 6 months  --Increased Imatinib to 600mg daily in November 2021 with a decrease in BCR-ABL from 60% to 28% to 19% to 10% to 0.2%   --Currently 0.06%  --Will check labs q 3 months    2. Diverticulitis  --EGD and Colonoscopy planned  --Will defer management to GI         Follow Up:      he will return to clinic in 3 months with labs 1 week earlier, but knows to call in the interim if symptoms change or should a problem arise.      BMT Chart Routing      Follow up with physician 3 months. 1. labs in 3 months: cbc,cmp, bcr-abl p210 at [facility] 2. see me 1 week after labs   Follow up with RAN    Provider visit type    Infusion scheduling note    Injection scheduling note    Labs    Imaging    Pharmacy appointment    Other referrals                     Catalina Rucker MD  Bone Marrow Transplant Physician

## 2024-04-15 ENCOUNTER — PATIENT MESSAGE (OUTPATIENT)
Dept: GASTROENTEROLOGY | Facility: CLINIC | Age: 54
End: 2024-04-15
Payer: COMMERCIAL

## 2024-05-30 ENCOUNTER — PATIENT MESSAGE (OUTPATIENT)
Dept: HEMATOLOGY/ONCOLOGY | Facility: CLINIC | Age: 54
End: 2024-05-30
Payer: COMMERCIAL

## 2024-05-30 DIAGNOSIS — C92.10 CML (CHRONIC MYELOCYTIC LEUKEMIA): Primary | ICD-10-CM

## 2024-05-31 DIAGNOSIS — C92.10 CML (CHRONIC MYELOCYTIC LEUKEMIA): ICD-10-CM

## 2024-05-31 RX ORDER — IMATINIB MESYLATE 100 MG/1
600 TABLET, FILM COATED ORAL DAILY
Qty: 180 TABLET | Refills: 11 | Status: ACTIVE | OUTPATIENT
Start: 2024-05-31

## 2024-06-10 ENCOUNTER — PATIENT MESSAGE (OUTPATIENT)
Dept: HEMATOLOGY/ONCOLOGY | Facility: CLINIC | Age: 54
End: 2024-06-10
Payer: COMMERCIAL

## 2024-06-10 ENCOUNTER — PATIENT MESSAGE (OUTPATIENT)
Dept: ADMINISTRATIVE | Facility: HOSPITAL | Age: 54
End: 2024-06-10
Payer: COMMERCIAL

## 2024-06-18 ENCOUNTER — OFFICE VISIT (OUTPATIENT)
Dept: HEMATOLOGY/ONCOLOGY | Facility: CLINIC | Age: 54
End: 2024-06-18
Payer: COMMERCIAL

## 2024-06-18 VITALS
RESPIRATION RATE: 18 BRPM | BODY MASS INDEX: 30.04 KG/M2 | WEIGHT: 202.81 LBS | TEMPERATURE: 99 F | DIASTOLIC BLOOD PRESSURE: 69 MMHG | HEIGHT: 69 IN | OXYGEN SATURATION: 97 % | HEART RATE: 73 BPM | SYSTOLIC BLOOD PRESSURE: 143 MMHG

## 2024-06-18 DIAGNOSIS — C92.10 CML (CHRONIC MYELOCYTIC LEUKEMIA): Primary | ICD-10-CM

## 2024-06-18 PROCEDURE — 3078F DIAST BP <80 MM HG: CPT | Mod: CPTII,S$GLB,, | Performed by: PHYSICIAN ASSISTANT

## 2024-06-18 PROCEDURE — 99214 OFFICE O/P EST MOD 30 MIN: CPT | Mod: S$GLB,,, | Performed by: PHYSICIAN ASSISTANT

## 2024-06-18 PROCEDURE — 3008F BODY MASS INDEX DOCD: CPT | Mod: CPTII,S$GLB,, | Performed by: PHYSICIAN ASSISTANT

## 2024-06-18 PROCEDURE — 3077F SYST BP >= 140 MM HG: CPT | Mod: CPTII,S$GLB,, | Performed by: PHYSICIAN ASSISTANT

## 2024-06-18 PROCEDURE — 1159F MED LIST DOCD IN RCRD: CPT | Mod: CPTII,S$GLB,, | Performed by: PHYSICIAN ASSISTANT

## 2024-06-18 PROCEDURE — 99999 PR PBB SHADOW E&M-EST. PATIENT-LVL IV: CPT | Mod: PBBFAC,,, | Performed by: PHYSICIAN ASSISTANT

## 2024-06-18 PROCEDURE — 1160F RVW MEDS BY RX/DR IN RCRD: CPT | Mod: CPTII,S$GLB,, | Performed by: PHYSICIAN ASSISTANT

## 2024-06-18 NOTE — PROGRESS NOTES
Section of Hematology and Stem Cell Transplantation  Follow Up Note     Visit Date: 06/18/2024    Primary Oncologic Diagnosis: CML      History of Present Ilness:   Romeo Couch Jr. (Romeo) is a pleasant 54 y.o.male with history of CML diagnosed in 2019, on Imatinib therapy.     Interval History:   Mr. Couch continues to work hard in his job. No change in activity, appetite or energy. No fevers, chills, bone pain, weight loss, bleeding/bruising. Remains very busy.     BCR-ABL overall stable at 0.1% (has fluctuated from 0.06% - 0.1% over past 2 years)     Oncology History Summary:   --[place] [facility] [place]. He reported that he went to his PCP with left abdominal pain and temp of 100.4 the day before and was sent by PCP for CT. CT showed splenomegaly. Pt was instructed to go to ED. Went to [place] ED and was found to have leukocytosis. [place] to [place] [facility] but no beds were available. Patient reportedly left [place] ED AMA and came [place] [facility] [place]. WBC 238K. Suspicious for acute leukemia. He denies any aches, chills, n/v/d, constipation, chest pain, bleeding or bruising. C/o SOB on exertion and mild, non-productive cough. Patient states that he has lost 8 lbs in the last few weeks.  --Peripheral smear was reviewed in the ED which was highly suggestive of CML  --Bone marrow biopsy on 4/25/19: CHRONIC MYELOID LEUKEMIA, BCR-ABL1-POSITIVE, CHRONIC PHASE. FOCAL GRADE 2 RETICULAR FIBROSIS  --BCR-ABL P210 80%  --Began imatinib on Saturday May 11, 2019      Past Medical History, Social History, and Past Family History are unchanged since last evaluation except for HPI.     CURRENT MEDICATIONS:   Current Outpatient Medications   Medication Sig    fluticasone propionate (FLONASE) 50 mcg/actuation nasal spray 2 sprays by Each Nostril route once daily.    imatinib (GLEEVEC) 100 MG Tab Take 6 tablets (600 mg total) by mouth once daily.    potassium chloride SA (K-DUR,KLOR-CON) 20 MEQ  tablet Take 1 tablet (20 mEq total) by mouth once daily.    amoxicillin (AMOXIL) 500 MG capsule Take 2 capsules 2 times per day for 14 days.  Take all 4 medications together (Patient not taking: Reported on 3/4/2024)    dicyclomine (BENTYL) 20 mg tablet Take 1 tablet (20 mg total) by mouth 3 (three) times daily. (Patient not taking: Reported on 3/4/2024)    omeprazole (PRILOSEC) 40 MG capsule Take 1 capsule (40 mg total) by mouth 2 (two) times daily before meals. Take all 4 medications together for 14 days (Patient not taking: Reported on 3/4/2024)     Current Facility-Administered Medications   Medication    acetaminophen tablet 650 mg    albuterol inhaler 2 puff    diphenhydrAMINE injection 25 mg    EPINEPHrine (EPIPEN) 0.3 mg/0.3 mL pen injection 0.3 mg    methylPREDNISolone sodium succinate injection 40 mg    ondansetron disintegrating tablet 4 mg    sodium chloride 0.9% 500 mL flush bag    sodium chloride 0.9% flush 10 mL       ALLERGIES:   Review of patient's allergies indicates:  No Known Allergies      Review of Systems:     Review of Systems   Constitutional:  Negative for fever, malaise/fatigue and weight loss.   Eyes:  Negative for double vision, photophobia and discharge.   Respiratory:  Negative for cough and shortness of breath.    Cardiovascular:  Negative for chest pain.   Gastrointestinal:  Negative for abdominal pain and diarrhea.   Genitourinary:  Negative for frequency.   Musculoskeletal:  Negative for back pain.   Skin:  Negative for rash.   Neurological:  Negative for dizziness, tremors, weakness and headaches.   Psychiatric/Behavioral:  The patient is not nervous/anxious.          Physical Exam:     Vitals:    06/18/24 1041   BP: (!) 143/69   Pulse: 73   Resp: 18   Temp: 98.7 °F (37.1 °C)     Physical Exam  Constitutional:       General: He is not in acute distress.     Appearance: He is well-developed. He is not diaphoretic.   HENT:      Head: Normocephalic and atraumatic.      Mouth/Throat:       Pharynx: No oropharyngeal exudate.   Eyes:      Conjunctiva/sclera: Conjunctivae normal.      Pupils: Pupils are equal, round, and reactive to light.   Neck:      Thyroid: No thyromegaly.      Vascular: No JVD.      Trachea: No tracheal deviation.   Cardiovascular:      Rate and Rhythm: Normal rate and regular rhythm.      Heart sounds: Normal heart sounds. No murmur heard.     No friction rub.   Pulmonary:      Effort: Pulmonary effort is normal. No respiratory distress.      Breath sounds: Normal breath sounds. No stridor. No wheezing or rales.   Chest:      Chest wall: No tenderness.   Abdominal:      General: Bowel sounds are normal. There is no distension.      Palpations: Abdomen is soft.      Tenderness: There is no abdominal tenderness. There is no guarding or rebound.   Musculoskeletal:         General: No tenderness or deformity. Normal range of motion.      Cervical back: Normal range of motion and neck supple.   Skin:     General: Skin is warm and dry.      Capillary Refill: Capillary refill takes less than 2 seconds.      Coloration: Skin is not pale.      Findings: No erythema or rash.   Neurological:      Mental Status: He is alert and oriented to person, place, and time.      Cranial Nerves: No cranial nerve deficit.      Sensory: No sensory deficit.      Motor: No abnormal muscle tone.      Coordination: Coordination normal.      Deep Tendon Reflexes: Reflexes normal.   Psychiatric:         Behavior: Behavior normal.         Thought Content: Thought content normal.         Judgment: Judgment normal.         ECOG Performance Status: (foot note - ECOG PS provided by Eastern Cooperative Oncology Group) 0 - Asymptomatic    Karnofsky Performance Score:  100%- Normal, No Complaints, No Evidence of Disease    Labs:   Lab Results   Component Value Date    WBC 4.22 05/31/2024    HGB 13.6 (L) 05/31/2024    HCT 38.3 (L) 05/31/2024    MCV 97 05/31/2024     05/31/2024       Lab Results   Component  Value Date     05/31/2024    K 3.3 (L) 05/31/2024     05/31/2024    CO2 24 05/31/2024    BUN 11 05/31/2024    CREATININE 1.3 05/31/2024    ALBUMIN 4.3 05/31/2024    BILITOT 0.9 05/31/2024    ALKPHOS 49 (L) 05/31/2024    AST 58 (H) 05/31/2024    ALT 45 (H) 05/31/2024     Positive. BCR/ABL1 p210 mRNA transcripts were detected and   estimated to represent 0.06% of total ABL1        Assessment and Plan:   Romeo Couch Jr. (Romeo) is a pleasant 54 y.o.male with history of CML.    CML  --Diagnosed in 2019  --Began Imatinib 400mg daily on Sat May 11th, with a goal of of less that 10% BCR-ABL at 6 months  --Increased Imatinib to 600mg daily in November 2021 with a decrease in BCR-ABL from 60% to 28% to 19% to 10% to 0.2%   --Currently 0.1%  --Will check labs q 3 months        Follow Up:      he will return to clinic in 3 months with labs 1 week earlier, but knows to call in the interim if symptoms change or should a problem arise.      BMT Chart Routing      Follow up with physician 3 months. martin f/u 3 mo   Follow up with RAN    Provider visit type    Infusion scheduling note    Injection scheduling note    Labs BCR/ABL, CBC, CMP and LDH   Scheduling:  Preferred lab:  Lab interval:  labs in 3 months at [place] (1 wk before martin f/u)   Imaging    Pharmacy appointment    Other referrals                  Viri Mac PA-C  Malignant Hematology & Bone Marrow Transplant

## 2024-09-18 ENCOUNTER — OFFICE VISIT (OUTPATIENT)
Dept: HEMATOLOGY/ONCOLOGY | Facility: CLINIC | Age: 54
End: 2024-09-18
Payer: COMMERCIAL

## 2024-09-18 VITALS
HEART RATE: 72 BPM | BODY MASS INDEX: 29.83 KG/M2 | HEIGHT: 69 IN | SYSTOLIC BLOOD PRESSURE: 144 MMHG | RESPIRATION RATE: 18 BRPM | WEIGHT: 201.38 LBS | TEMPERATURE: 98 F | OXYGEN SATURATION: 99 % | DIASTOLIC BLOOD PRESSURE: 83 MMHG

## 2024-09-18 DIAGNOSIS — Z79.899 IMMUNODEFICIENCY DUE TO DRUG THERAPY: ICD-10-CM

## 2024-09-18 DIAGNOSIS — D84.821 IMMUNODEFICIENCY DUE TO DRUG THERAPY: ICD-10-CM

## 2024-09-18 DIAGNOSIS — D72.820 LYMPHOCYTOSIS: ICD-10-CM

## 2024-09-18 DIAGNOSIS — C92.10 CML (CHRONIC MYELOCYTIC LEUKEMIA): Primary | ICD-10-CM

## 2024-09-18 PROCEDURE — 3077F SYST BP >= 140 MM HG: CPT | Mod: CPTII,S$GLB,, | Performed by: INTERNAL MEDICINE

## 2024-09-18 PROCEDURE — 99215 OFFICE O/P EST HI 40 MIN: CPT | Mod: S$GLB,,, | Performed by: INTERNAL MEDICINE

## 2024-09-18 PROCEDURE — 3008F BODY MASS INDEX DOCD: CPT | Mod: CPTII,S$GLB,, | Performed by: INTERNAL MEDICINE

## 2024-09-18 PROCEDURE — 99999 PR PBB SHADOW E&M-EST. PATIENT-LVL III: CPT | Mod: PBBFAC,,, | Performed by: INTERNAL MEDICINE

## 2024-09-18 PROCEDURE — 3079F DIAST BP 80-89 MM HG: CPT | Mod: CPTII,S$GLB,, | Performed by: INTERNAL MEDICINE

## 2024-09-18 PROCEDURE — 1159F MED LIST DOCD IN RCRD: CPT | Mod: CPTII,S$GLB,, | Performed by: INTERNAL MEDICINE

## 2024-09-18 NOTE — PROGRESS NOTES
Section of Hematology and Stem Cell Transplantation  Follow Up Note     Visit Date: 09/18/2024    Primary Oncologic Diagnosis: CML      History of Present Ilness:   Romeo Couch Jr. (Romeo) is a pleasant 54 y.o.male with history of CML diagnosed in 2019, on Imatinib therapy.     Interval History:   Mr. Couch continues to work hard in his job. No change in activity, appetite or energy. Remains very busy. Spleen area has not hurt in a long time. Has intermittent thigh and calf cramps, particularly in the summer. Improved with drinking body armour iv.       No further diverticulitis.     Did a golf tournament in [facility] and stayed extra days to enjoy the area.    Happy to hear about the stability of his BCR-ABL.      Oncology History Summary:   --[place] [facility] [place]. He reported that he went to his PCP with left abdominal pain and temp of 100.4 the day before and was sent by PCP for CT. CT showed splenomegaly. Pt was instructed to go to ED. Went to [place] ED and was found to have leukocytosis. [place] [facility] no [place] were available. Patient reportedly left [place] ED AMA and came [place] [facility] [place]. WBC 238K. Suspicious for acute leukemia. He denies any aches, chills, n/v/d, constipation, chest pain, bleeding or bruising. C/o SOB on exertion and mild, non-productive cough. Patient states that he has lost 8 lbs in the last few weeks.  --Peripheral smear was reviewed in the ED which was highly suggestive of CML  --Bone marrow biopsy on 4/25/19: CHRONIC MYELOID LEUKEMIA, BCR-ABL1-POSITIVE, CHRONIC PHASE. FOCAL GRADE 2 RETICULAR FIBROSIS  --BCR-ABL P210 80%  --Began imatinib on Saturday May 11, 2019      Past Medical History, Social History, and Past Family History are unchanged since last evaluation except for HPI.     CURRENT MEDICATIONS:   Current Outpatient Medications   Medication Sig    fluticasone propionate (FLONASE) 50 mcg/actuation nasal spray 2 sprays by  Each Nostril route once daily.    imatinib (GLEEVEC) 100 MG Tab Take 6 tablets (600 mg total) by mouth once daily.    potassium chloride SA (K-DUR,KLOR-CON) 20 MEQ tablet Take 1 tablet (20 mEq total) by mouth once daily.     Current Facility-Administered Medications   Medication    acetaminophen tablet 650 mg    albuterol inhaler 2 puff    diphenhydrAMINE injection 25 mg    EPINEPHrine (EPIPEN) 0.3 mg/0.3 mL pen injection 0.3 mg    methylPREDNISolone sodium succinate injection 40 mg    ondansetron disintegrating tablet 4 mg    sodium chloride 0.9% 500 mL flush bag    sodium chloride 0.9% flush 10 mL       ALLERGIES:   Review of patient's allergies indicates:  No Known Allergies      Review of Systems:     Review of Systems   Constitutional:  Negative for fever, malaise/fatigue and weight loss.   Eyes:  Negative for double vision, photophobia and discharge.   Respiratory:  Negative for cough and shortness of breath.    Cardiovascular:  Negative for chest pain.   Gastrointestinal:  Negative for abdominal pain and diarrhea.   Genitourinary:  Negative for frequency.   Musculoskeletal:  Positive for myalgias. Negative for back pain.   Skin:  Negative for rash.   Neurological:  Negative for dizziness, tremors, weakness and headaches.   Psychiatric/Behavioral:  The patient is not nervous/anxious.          Physical Exam:     Vitals:    09/18/24 1023   BP: (!) 144/83   Pulse: 72   Resp: 18   Temp: 97.9 °F (36.6 °C)     Physical Exam  Constitutional:       General: He is not in acute distress.     Appearance: He is well-developed. He is not diaphoretic.   HENT:      Head: Normocephalic and atraumatic.      Mouth/Throat:      Pharynx: No oropharyngeal exudate.   Eyes:      Conjunctiva/sclera: Conjunctivae normal.      Pupils: Pupils are equal, round, and reactive to light.   Neck:      Thyroid: No thyromegaly.      Vascular: No JVD.      Trachea: No tracheal deviation.   Cardiovascular:      Rate and Rhythm: Normal rate and  regular rhythm.      Heart sounds: Normal heart sounds. No murmur heard.     No friction rub.   Pulmonary:      Effort: Pulmonary effort is normal. No respiratory distress.      Breath sounds: Normal breath sounds. No stridor. No wheezing or rales.   Chest:      Chest wall: No tenderness.   Abdominal:      General: Bowel sounds are normal. There is no distension.      Palpations: Abdomen is soft.      Tenderness: There is no abdominal tenderness. There is no guarding or rebound.   Musculoskeletal:         General: No tenderness or deformity. Normal range of motion.      Cervical back: Normal range of motion and neck supple.   Skin:     General: Skin is warm and dry.      Capillary Refill: Capillary refill takes less than 2 seconds.      Coloration: Skin is not pale.      Findings: No erythema or rash.   Neurological:      Mental Status: He is alert and oriented to person, place, and time.      Cranial Nerves: No cranial nerve deficit.      Sensory: No sensory deficit.      Motor: No abnormal muscle tone.      Coordination: Coordination normal.      Deep Tendon Reflexes: Reflexes normal.   Psychiatric:         Behavior: Behavior normal.         Thought Content: Thought content normal.         Judgment: Judgment normal.         ECOG Performance Status: (foot note - ECOG PS provided by Eastern Cooperative Oncology Group) 0 - Asymptomatic    Karnofsky Performance Score:  100%- Normal, No Complaints, No Evidence of Disease    Labs:   Lab Results   Component Value Date    WBC 5.34 09/10/2024    HGB 12.5 (L) 09/10/2024    HCT 35.9 (L) 09/10/2024    MCV 98 09/10/2024     09/10/2024       Lab Results   Component Value Date     09/10/2024    K 3.8 09/10/2024     09/10/2024    CO2 24 09/10/2024    BUN 15 09/10/2024    CREATININE 1.2 09/10/2024    ALBUMIN 4.0 09/10/2024    BILITOT 0.4 09/10/2024    ALKPHOS 49 (L) 09/10/2024    AST 36 09/10/2024    ALT 25 09/10/2024     Positive. BCR/ABL1 p210 mRNA transcripts  were detected and   estimated to represent 0.04% of total ABL1 (%BCR/ABL1(p210):ABL1) (MR 3.4).         Assessment and Plan:   Romeo Couch Jr. (Romeo) is a pleasant 54 y.o.male with history of CML.    CML  --Diagnosed in 2019  --Began Imatinib 400mg daily on Sat May 11th, with a goal of of less that 10% BCR-ABL at 6 months  --Increased Imatinib to 600mg daily in November 2021 with a decrease in BCR-ABL from 60% to 28% to 19% to 10% to 0.2%   --Currently 0.04%  --Will check labs q 3 months    2. Diverticulitis  --EGD and Colonoscopy planned  --Will defer management to GI  --No further flares         Follow Up:      40 minutes were spent in total on this encounter, of which 30 minutes were spent face to face with the patient and his family to discuss the disease, natural history, treatment options and survival statistics. I have provided the patient with an opportunity to ask questions and have all questions answered to his satisfaction.     she will return to clinic in 3 months with labs, but knows to call in the interim if symptoms change or should a problem arise.      Catalina Rucker MD  Hematology and Medical Oncology  Bone Marrow Transplant  [facility]        BMT Chart Routing      Follow up with physician . 1. labs in 3 months at [facility]: cbc,cmp, bcr-abl p210 2.see me 1 week after labs   Follow up with RAN    Provider visit type    Infusion scheduling note    Injection scheduling note    Labs    Imaging    Pharmacy appointment    Other referrals

## 2024-11-11 ENCOUNTER — OFFICE VISIT (OUTPATIENT)
Dept: URGENT CARE | Facility: CLINIC | Age: 54
End: 2024-11-11
Payer: COMMERCIAL

## 2024-11-11 VITALS
HEIGHT: 69 IN | WEIGHT: 205.25 LBS | RESPIRATION RATE: 17 BRPM | OXYGEN SATURATION: 98 % | TEMPERATURE: 98 F | HEART RATE: 75 BPM | SYSTOLIC BLOOD PRESSURE: 130 MMHG | BODY MASS INDEX: 30.4 KG/M2 | DIASTOLIC BLOOD PRESSURE: 75 MMHG

## 2024-11-11 DIAGNOSIS — R05.9 COUGH, UNSPECIFIED TYPE: ICD-10-CM

## 2024-11-11 DIAGNOSIS — J20.9 ACUTE BRONCHITIS, UNSPECIFIED ORGANISM: ICD-10-CM

## 2024-11-11 DIAGNOSIS — R09.81 NASAL CONGESTION: ICD-10-CM

## 2024-11-11 DIAGNOSIS — J32.9 SINUSITIS, UNSPECIFIED CHRONICITY, UNSPECIFIED LOCATION: Primary | ICD-10-CM

## 2024-11-11 LAB
CTP QC/QA: YES
SARS-COV-2 AG RESP QL IA.RAPID: NEGATIVE

## 2024-11-11 PROCEDURE — 99214 OFFICE O/P EST MOD 30 MIN: CPT | Mod: S$GLB,,, | Performed by: PHYSICIAN ASSISTANT

## 2024-11-11 PROCEDURE — 87811 SARS-COV-2 COVID19 W/OPTIC: CPT | Mod: QW,S$GLB,, | Performed by: PHYSICIAN ASSISTANT

## 2024-11-11 RX ORDER — GUAIFENESIN AND DEXTROMETHORPHAN HYDROBROMIDE 1200; 60 MG/1; MG/1
1 TABLET, EXTENDED RELEASE ORAL EVERY 12 HOURS
Qty: 20 TABLET | Refills: 0 | Status: SHIPPED | OUTPATIENT
Start: 2024-11-11 | End: 2024-11-21

## 2024-11-11 RX ORDER — AZELASTINE 1 MG/ML
1 SPRAY, METERED NASAL 2 TIMES DAILY
Qty: 30 ML | Refills: 1 | Status: SHIPPED | OUTPATIENT
Start: 2024-11-11 | End: 2025-11-11

## 2024-11-11 RX ORDER — PREDNISONE 20 MG/1
20 TABLET ORAL DAILY
Qty: 4 TABLET | Refills: 0 | Status: SHIPPED | OUTPATIENT
Start: 2024-11-11

## 2024-11-11 RX ORDER — VITAMIN A 3000 MCG
2 CAPSULE ORAL
Qty: 60 ML | Refills: 12 | Status: SHIPPED | OUTPATIENT
Start: 2024-11-11

## 2024-11-11 RX ORDER — FLUTICASONE PROPIONATE 50 MCG
1 SPRAY, SUSPENSION (ML) NASAL DAILY
Qty: 16 G | Refills: 3 | Status: SHIPPED | OUTPATIENT
Start: 2024-11-11

## 2024-11-11 RX ORDER — ALBUTEROL SULFATE 90 UG/1
2 INHALANT RESPIRATORY (INHALATION) EVERY 4 HOURS PRN
Qty: 18 G | Refills: 0 | Status: SHIPPED | OUTPATIENT
Start: 2024-11-11 | End: 2025-11-11

## 2024-11-11 NOTE — PROGRESS NOTES
"Subjective:      Patient ID: Romeo Couch Jr. is a 54 y.o. male.    Vitals:  height is 5' 9" (1.753 m) and weight is 93.1 kg (205 lb 4 oz). His oral temperature is 98.4 °F (36.9 °C). His blood pressure is 130/75 and his pulse is 75. His respiration is 17 and oxygen saturation is 98%.     Chief Complaint: Cough    Patient presents with cough producing yellow green sputum and nasal congestion.  Onset 2 days ago. Patient states he ran a low grade fever . Patient denies fever today. No other symptoms reported.     Cough  This is a new problem. Episode onset: 2 days ago. The problem has been unchanged. The problem occurs hourly. The cough is Productive of sputum. Associated symptoms include a fever, nasal congestion and postnasal drip. Pertinent negatives include no ear pain, rash or sore throat. Nothing aggravates the symptoms. Treatments tried: tylenol. The treatment provided mild relief. There is no history of asthma, bronchitis or pneumonia.       Constitution: Positive for fever.   HENT:  Positive for congestion and postnasal drip. Negative for ear pain and sore throat.    Respiratory:  Positive for cough.    Skin:  Negative for rash and erythema.      Objective:     Physical Exam   Constitutional: He is oriented to person, place, and time. He appears well-developed. He is cooperative.  Non-toxic appearance. He does not appear ill. No distress.   HENT:   Head: Normocephalic and atraumatic.   Ears:   Right Ear: Hearing, tympanic membrane, external ear and ear canal normal.   Left Ear: Hearing, tympanic membrane, external ear and ear canal normal.   Nose: Nose normal. No mucosal edema, rhinorrhea, nasal deformity or congestion. No epistaxis. Right sinus exhibits no maxillary sinus tenderness and no frontal sinus tenderness. Left sinus exhibits no maxillary sinus tenderness and no frontal sinus tenderness.   Mouth/Throat: Uvula is midline, oropharynx is clear and moist and mucous membranes are normal. No trismus in " the jaw. Normal dentition. No uvula swelling. No oropharyngeal exudate, posterior oropharyngeal edema or posterior oropharyngeal erythema.   Eyes: Conjunctivae, EOM and lids are normal. Pupils are equal, round, and reactive to light. Right eye exhibits no discharge. Left eye exhibits no discharge. No scleral icterus.   Neck: Trachea normal and phonation normal. Neck supple. No JVD present. No tracheal deviation present. No thyromegaly present. No edema present. No erythema present. No neck rigidity present.   Cardiovascular: Normal rate, regular rhythm, normal heart sounds and normal pulses.   No murmur heard.Exam reveals no gallop and no friction rub.   Pulmonary/Chest: Effort normal and breath sounds normal. No stridor. No respiratory distress. He has no decreased breath sounds. He has no wheezes. He has no rhonchi. He has no rales. He exhibits no tenderness.   Abdominal: Normal appearance. He exhibits no distension. Soft. There is no abdominal tenderness. There is no rebound and no guarding.   Musculoskeletal: Normal range of motion.         General: No deformity. Normal range of motion.   Neurological: He is alert and oriented to person, place, and time. He exhibits normal muscle tone. Coordination normal.   Skin: Skin is warm, dry, intact, not diaphoretic, not pale and no rash. Capillary refill takes less than 2 seconds. No erythema   Psychiatric: His speech is normal and behavior is normal. Judgment and thought content normal.   Nursing note and vitals reviewed.    Results for orders placed or performed in visit on 11/11/24   SARS Coronavirus 2 Antigen, POCT Manual Read    Collection Time: 11/11/24  8:40 AM   Result Value Ref Range    SARS Coronavirus 2 Antigen Negative Negative     Acceptable Yes     No results found.     Assessment:     1. Sinusitis, unspecified chronicity, unspecified location    2. Nasal congestion    3. Cough, unspecified type    4. Acute bronchitis, unspecified organism         Plan:       Sinusitis, unspecified chronicity, unspecified location  -     dextromethorphan-guaiFENesin (MUCINEX DM) 60-1,200 mg per 12 hr tablet; Take 1 tablet by mouth every 12 (twelve) hours. for 10 days  Dispense: 20 tablet; Refill: 0  -     fluticasone propionate (FLONASE) 50 mcg/actuation nasal spray; 1 spray (50 mcg total) by Each Nostril route once daily.  Dispense: 16 g; Refill: 3  -     azelastine (ASTELIN) 137 mcg (0.1 %) nasal spray; 1 spray (137 mcg total) by Nasal route 2 (two) times daily.  Dispense: 30 mL; Refill: 1  -     sodium chloride (SALINE NASAL) 0.65 % nasal spray; 2 sprays by Nasal route as needed for Congestion.  Dispense: 60 mL; Refill: 12  -     predniSONE (DELTASONE) 20 MG tablet; Take 1 tablet (20 mg total) by mouth once daily.  Dispense: 4 tablet; Refill: 0    Nasal congestion  -     SARS Coronavirus 2 Antigen, POCT Manual Read    Cough, unspecified type  -     dextromethorphan-guaiFENesin (MUCINEX DM) 60-1,200 mg per 12 hr tablet; Take 1 tablet by mouth every 12 (twelve) hours. for 10 days  Dispense: 20 tablet; Refill: 0  -     fluticasone propionate (FLONASE) 50 mcg/actuation nasal spray; 1 spray (50 mcg total) by Each Nostril route once daily.  Dispense: 16 g; Refill: 3  -     sodium chloride (SALINE NASAL) 0.65 % nasal spray; 2 sprays by Nasal route as needed for Congestion.  Dispense: 60 mL; Refill: 12  -     albuterol (VENTOLIN HFA) 90 mcg/actuation inhaler; Inhale 2 puffs into the lungs every 4 (four) hours as needed for Wheezing or Shortness of Breath (cough). Rescue  Dispense: 18 g; Refill: 0  -     predniSONE (DELTASONE) 20 MG tablet; Take 1 tablet (20 mg total) by mouth once daily.  Dispense: 4 tablet; Refill: 0    Acute bronchitis, unspecified organism  -     dextromethorphan-guaiFENesin (MUCINEX DM) 60-1,200 mg per 12 hr tablet; Take 1 tablet by mouth every 12 (twelve) hours. for 10 days  Dispense: 20 tablet; Refill: 0  -     albuterol (VENTOLIN HFA) 90 mcg/actuation  inhaler; Inhale 2 puffs into the lungs every 4 (four) hours as needed for Wheezing or Shortness of Breath (cough). Rescue  Dispense: 18 g; Refill: 0  -     predniSONE (DELTASONE) 20 MG tablet; Take 1 tablet (20 mg total) by mouth once daily.  Dispense: 4 tablet; Refill: 0    Follow up if symptoms worsen or fail to improve, for F/U with PCP or ED. There are no Patient Instructions on file for this visit.

## 2024-12-03 ENCOUNTER — OFFICE VISIT (OUTPATIENT)
Dept: HEMATOLOGY/ONCOLOGY | Facility: CLINIC | Age: 54
End: 2024-12-03
Payer: COMMERCIAL

## 2024-12-03 VITALS
BODY MASS INDEX: 30.89 KG/M2 | HEIGHT: 69 IN | HEART RATE: 64 BPM | RESPIRATION RATE: 18 BRPM | OXYGEN SATURATION: 100 % | WEIGHT: 208.56 LBS | DIASTOLIC BLOOD PRESSURE: 78 MMHG | SYSTOLIC BLOOD PRESSURE: 132 MMHG | TEMPERATURE: 98 F

## 2024-12-03 DIAGNOSIS — C92.10 CML (CHRONIC MYELOCYTIC LEUKEMIA): Primary | ICD-10-CM

## 2024-12-03 PROCEDURE — 99999 PR PBB SHADOW E&M-EST. PATIENT-LVL IV: CPT | Mod: PBBFAC,,, | Performed by: INTERNAL MEDICINE

## 2024-12-03 PROCEDURE — 3075F SYST BP GE 130 - 139MM HG: CPT | Mod: CPTII,S$GLB,, | Performed by: INTERNAL MEDICINE

## 2024-12-03 PROCEDURE — 3008F BODY MASS INDEX DOCD: CPT | Mod: CPTII,S$GLB,, | Performed by: INTERNAL MEDICINE

## 2024-12-03 PROCEDURE — G2211 COMPLEX E/M VISIT ADD ON: HCPCS | Mod: S$GLB,,, | Performed by: INTERNAL MEDICINE

## 2024-12-03 PROCEDURE — 99215 OFFICE O/P EST HI 40 MIN: CPT | Mod: S$GLB,,, | Performed by: INTERNAL MEDICINE

## 2024-12-03 PROCEDURE — 3078F DIAST BP <80 MM HG: CPT | Mod: CPTII,S$GLB,, | Performed by: INTERNAL MEDICINE

## 2024-12-03 PROCEDURE — 1159F MED LIST DOCD IN RCRD: CPT | Mod: CPTII,S$GLB,, | Performed by: INTERNAL MEDICINE

## 2024-12-03 NOTE — PROGRESS NOTES
Section of Hematology and Stem Cell Transplantation  Follow Up Note     Visit Date: 12/03/2024    Primary Oncologic Diagnosis: CML      History of Present Ilness:   Romeo Couch Jr. (Romeo) is a pleasant 54 y.o.male with history of CML diagnosed in 2019, on Imatinib therapy.     Interval History:   Mr. Couch feels overall well. No change in activity, appetite or energy. Remains very busy. Spleen area has not hurt in a long time. He had an episode of bronchitis and sinusitis about a few weeks ago and received antibiotics. He says symptoms have improved.     Oncology History Summary:   --Presented to [facility] ED on 4/23/19. He reported that he went to his PCP with left abdominal pain and temp of 100.4 the day before and was sent by PCP for CT. CT showed splenomegaly. Pt was instructed to go to ED. Went to [place] ED and was found to have leukocytosis. Wanted to transfer patient to [facility], but no beds were available. Patient reportedly left [place] ED AMA and came to [facility] ED over night. WBC 238K. Suspicious for acute leukemia. He denies any aches, chills, n/v/d, constipation, chest pain, bleeding or bruising. C/o SOB on exertion and mild, non-productive cough. Patient states that he has lost 8 lbs in the last few weeks.  --Peripheral smear was reviewed in the ED which was highly suggestive of CML  --Bone marrow biopsy on 4/25/19: CHRONIC MYELOID LEUKEMIA, BCR-ABL1-POSITIVE, CHRONIC PHASE. FOCAL GRADE 2 RETICULAR FIBROSIS  --BCR-ABL P210 80%  --Began imatinib on Saturday May 11, 2019      Past Medical History, Social History, and Past Family History are unchanged since last evaluation except for HPI.     CURRENT MEDICATIONS:   Current Outpatient Medications   Medication Sig    albuterol (VENTOLIN HFA) 90 mcg/actuation inhaler Inhale 2 puffs into the lungs every 4 (four) hours as needed for Wheezing or Shortness of Breath (cough). Rescue    imatinib (GLEEVEC) 100 MG Tab Take 6 tablets (600 mg total) by mouth once  daily.    azelastine (ASTELIN) 137 mcg (0.1 %) nasal spray 1 spray (137 mcg total) by Nasal route 2 (two) times daily. (Patient not taking: Reported on 12/3/2024)    fluticasone propionate (FLONASE) 50 mcg/actuation nasal spray 2 sprays by Each Nostril route once daily. (Patient not taking: Reported on 12/3/2024)    fluticasone propionate (FLONASE) 50 mcg/actuation nasal spray 1 spray (50 mcg total) by Each Nostril route once daily. (Patient not taking: Reported on 12/3/2024)    potassium chloride SA (K-DUR,KLOR-CON) 20 MEQ tablet Take 1 tablet (20 mEq total) by mouth once daily. (Patient not taking: Reported on 12/3/2024)    predniSONE (DELTASONE) 20 MG tablet Take 1 tablet (20 mg total) by mouth once daily. (Patient not taking: Reported on 12/3/2024)    sodium chloride (SALINE NASAL) 0.65 % nasal spray 2 sprays by Nasal route as needed for Congestion. (Patient not taking: Reported on 12/3/2024)     Current Facility-Administered Medications   Medication    acetaminophen tablet 650 mg    albuterol inhaler 2 puff    diphenhydrAMINE injection 25 mg    EPINEPHrine (EPIPEN) 0.3 mg/0.3 mL pen injection 0.3 mg    methylPREDNISolone sodium succinate injection 40 mg    ondansetron disintegrating tablet 4 mg    sodium chloride 0.9% 500 mL flush bag    sodium chloride 0.9% flush 10 mL       ALLERGIES:   Review of patient's allergies indicates:  No Known Allergies      Review of Systems:     Review of Systems   Constitutional:  Negative for fever, malaise/fatigue and weight loss.   HENT:  Negative for hearing loss and tinnitus.    Eyes:  Negative for double vision, photophobia and discharge.   Respiratory:  Negative for cough and shortness of breath.    Cardiovascular:  Negative for chest pain and leg swelling.   Gastrointestinal:  Negative for abdominal pain and diarrhea.   Genitourinary:  Negative for frequency.   Musculoskeletal:  Negative for back pain and myalgias.   Skin:  Negative for itching and rash.   Neurological:   Negative for dizziness, tremors, weakness and headaches.   Psychiatric/Behavioral:  Negative for depression. The patient is not nervous/anxious.          Physical Exam:     Vitals:    12/03/24 0819   BP: 132/78   Pulse: 64   Resp: 18   Temp: 98.1 °F (36.7 °C)     Physical Exam  Constitutional:       General: He is not in acute distress.     Appearance: He is well-developed. He is not diaphoretic.   HENT:      Head: Normocephalic and atraumatic.      Mouth/Throat:      Pharynx: No oropharyngeal exudate.   Eyes:      Conjunctiva/sclera: Conjunctivae normal.      Pupils: Pupils are equal, round, and reactive to light.   Neck:      Thyroid: No thyromegaly.      Vascular: No JVD.      Trachea: No tracheal deviation.   Cardiovascular:      Rate and Rhythm: Normal rate and regular rhythm.      Heart sounds: Normal heart sounds. No murmur heard.     No friction rub.   Pulmonary:      Effort: Pulmonary effort is normal. No respiratory distress.      Breath sounds: Normal breath sounds. No stridor. No wheezing or rales.   Chest:      Chest wall: No tenderness.   Abdominal:      General: Bowel sounds are normal. There is no distension.      Palpations: Abdomen is soft.      Tenderness: There is no abdominal tenderness. There is no guarding or rebound.   Musculoskeletal:         General: No tenderness or deformity. Normal range of motion.      Cervical back: Normal range of motion and neck supple.   Skin:     General: Skin is warm and dry.      Capillary Refill: Capillary refill takes less than 2 seconds.      Coloration: Skin is not pale.      Findings: No erythema or rash.   Neurological:      Mental Status: He is alert and oriented to person, place, and time.      Cranial Nerves: No cranial nerve deficit.      Sensory: No sensory deficit.      Motor: No abnormal muscle tone.      Coordination: Coordination normal.      Deep Tendon Reflexes: Reflexes normal.   Psychiatric:         Behavior: Behavior normal.         Thought  Content: Thought content normal.         Judgment: Judgment normal.         ECOG Performance Status: (foot note - ECOG PS provided by Eastern Cooperative Oncology Group) 0 - Asymptomatic    Karnofsky Performance Score:  100%- Normal, No Complaints, No Evidence of Disease    Labs:   Lab Results   Component Value Date    WBC 5.97 11/26/2024    HGB 13.6 (L) 11/26/2024    HCT 40.0 11/26/2024     (H) 11/26/2024     11/26/2024       Lab Results   Component Value Date     11/26/2024    K 4.2 11/26/2024     11/26/2024    CO2 26 11/26/2024    BUN 12 11/26/2024    CREATININE 1.2 11/26/2024    ALBUMIN 3.8 11/26/2024    BILITOT 0.8 11/26/2024    ALKPHOS 51 11/26/2024    AST 30 11/26/2024    ALT 20 11/26/2024     Positive. BCR/ABL1 p210 mRNA transcripts were detected and estimated to represent 0.06% of total ABL1 (%BCR/ABL1(p210):ABL1) (MR 3.4).         Assessment and Plan:   Romeo Couch Jr. (Romeo) is a pleasant 54 y.o.male with history of CML.    CML  --Diagnosed in 2019  --Began Imatinib 400mg daily on 05/11/2019, with a goal of of less that 10% BCR-ABL at 6 months  --Increased Imatinib to 600mg daily in November 2021 with a decrease in BCR-ABL from 60% to 28% to 19% to 10% to 0.2%   --Currently 0.06% with continued molecular and clinical response  --Will check labs q 3 months      Follow Up:      40 minutes were spent in total on this encounter, of which 30 minutes were spent face to face with the patient and his family to discuss the disease, natural history, treatment options and survival statistics. I have provided the patient with an opportunity to ask questions and have all questions answered to his satisfaction.     He will return to clinic in 3 months with labs, but knows to call in the interim if symptoms change or should a problem arise.    Discussed with Dr. Rucker.    Lisset Jeronimo MD   Hematology and Oncology Fellow, PGY VI         BMT Chart Routing      Follow up with physician 3  months.   Follow up with RAN    Provider visit type Malignant hem   Infusion scheduling note    Injection scheduling note    Labs CBC, CMP and BCR/ABL   Scheduling:  Preferred lab:  Lab interval:  labs in 3 months at [facility]: cbc,cmp, bcr-abl p210; see me 1 week after labs   Imaging    Pharmacy appointment    Other referrals

## 2025-03-29 ENCOUNTER — PATIENT MESSAGE (OUTPATIENT)
Dept: HEMATOLOGY/ONCOLOGY | Facility: CLINIC | Age: 55
End: 2025-03-29
Payer: COMMERCIAL

## 2025-05-30 ENCOUNTER — OFFICE VISIT (OUTPATIENT)
Dept: HEMATOLOGY/ONCOLOGY | Facility: CLINIC | Age: 55
End: 2025-05-30
Payer: COMMERCIAL

## 2025-05-30 DIAGNOSIS — D84.821 IMMUNODEFICIENCY DUE TO DRUG THERAPY: ICD-10-CM

## 2025-05-30 DIAGNOSIS — C92.10 CML (CHRONIC MYELOCYTIC LEUKEMIA): Primary | ICD-10-CM

## 2025-05-30 DIAGNOSIS — Z79.899 IMMUNODEFICIENCY DUE TO DRUG THERAPY: ICD-10-CM

## 2025-05-30 NOTE — PROGRESS NOTES
Section of Hematology and Stem Cell Transplantation  Follow Up Note     Visit Date: 05/30/2025    Primary Oncologic Diagnosis: CML    Telemedicine Documentation:  The patient location is: home  The chief complaint leading to consultation is: CML on therapy    Visit type: audiovisual    Face to Face time with patient: 25  40 minutes of total time spent on the encounter, which includes face to face time and non-face to face time preparing to see the patient (eg, review of tests), Obtaining and/or reviewing separately obtained history, Documenting clinical information in the electronic or other health record, Independently interpreting results (not separately reported) and communicating results to the patient/family/caregiver, or Care coordination (not separately reported).         Each patient to whom he or she provides medical services by telemedicine is:  (1) informed of the relationship between the physician and patient and the respective role of any other health care provider with respect to management of the patient; and (2) notified that he or she may decline to receive medical services by telemedicine and may withdraw from such care at any time.      History of Present Ilness:   Romeo Couch Jr. (Romeo) is a pleasant 55 y.o.male with history of CML diagnosed in 2019, on Imatinib therapy.     Interval History:   Mr. Couch continues to work hard in his job. No change in activity, appetite or energy. Remains very busy. Spleen area has not hurt in a long time. Has intermittent thigh and calf cramps, particularly in the summer. Improved with drinking body armour iv.       Continues to do very well. Feels more sensitive to the heat than previous years.     The last several times they went to [facility] he had a diverticulitis flare.    Did a golf tournament in [facility] and stayed extra days to enjoy the area.    Happy to hear about the stability of his BCR-ABL.      Oncology History Summary:    --Presented to [facility] ED on 4/23/19. He reported that he went to his PCP with left abdominal pain and temp of 100.4 the day before and was sent by PCP for CT. CT showed splenomegaly. Pt was instructed to go to ED. Went to [place] ED and was found to have leukocytosis. Wanted to transfer patient to [facility], but no beds were available. Patient reportedly left [place] ED AMA and came to [facility] ED over night. WBC 238K. Suspicious for acute leukemia. He denies any aches, chills, n/v/d, constipation, chest pain, bleeding or bruising. C/o SOB on exertion and mild, non-productive cough. Patient states that he has lost 8 lbs in the last few weeks.  --Peripheral smear was reviewed in the ED which was highly suggestive of CML  --Bone marrow biopsy on 4/25/19: CHRONIC MYELOID LEUKEMIA, BCR-ABL1-POSITIVE, CHRONIC PHASE. FOCAL GRADE 2 RETICULAR FIBROSIS  --BCR-ABL P210 80%  --Began imatinib on Saturday May 11, 2019      Past Medical History, Social History, and Past Family History are unchanged since last evaluation except for HPI.     CURRENT MEDICATIONS:   Current Outpatient Medications   Medication Sig    amoxicillin (AMOXIL) 875 MG tablet Take 1 tablet by mouth twice per day till gone. Starting 24 hours prior to surgery    chlorhexidine (PERIDEX) 0.12 % solution Swish and spit 15 milliliters by mouth twice per day for 30 seconds    fluticasone propionate (FLONASE) 50 mcg/actuation nasal spray 2 sprays by Each Nostril route once daily. (Patient not taking: Reported on 12/3/2024)    fluticasone propionate (FLONASE) 50 mcg/actuation nasal spray 1 spray (50 mcg total) by Each Nostril route once daily. (Patient not taking: Reported on 12/3/2024)    ibuprofen (ADVIL,MOTRIN) 600 MG tablet Take 1 tablet by mouth every six hours as needed for needed for pain    imatinib (GLEEVEC) 100 MG Tab Take 6 tablets (600 mg total) by mouth once daily.    oxyCODONE-acetaminophen (PERCOCET) 5-325 mg per tablet Take 1 tablet by mouth every four  hours as needed for post op pain     Current Facility-Administered Medications   Medication    acetaminophen tablet 650 mg    albuterol inhaler 2 puff    diphenhydrAMINE injection 25 mg    EPINEPHrine (EPIPEN) 0.3 mg/0.3 mL pen injection 0.3 mg    methylPREDNISolone sodium succinate injection 40 mg    ondansetron disintegrating tablet 4 mg    sodium chloride 0.9% 500 mL flush bag    sodium chloride 0.9% flush 10 mL       ALLERGIES:   Review of patient's allergies indicates:  No Known Allergies      Review of Systems:     Review of Systems   Constitutional:  Negative for fever, malaise/fatigue and weight loss.   Eyes:  Negative for double vision, photophobia and discharge.   Respiratory:  Negative for cough and shortness of breath.    Cardiovascular:  Negative for chest pain.   Gastrointestinal:  Negative for abdominal pain and diarrhea.   Genitourinary:  Negative for frequency.   Musculoskeletal:  Positive for myalgias. Negative for back pain.   Skin:  Negative for rash.   Neurological:  Positive for tremors. Negative for dizziness, weakness and headaches.   Psychiatric/Behavioral:  The patient is not nervous/anxious.          Physical Exam:     Limited secondary to virtual visit    ECOG Performance Status: (foot note - ECOG PS provided by Eastern Cooperative Oncology Group) 0 - Asymptomatic    Karnofsky Performance Score:  100%- Normal, No Complaints, No Evidence of Disease    Labs:   Lab Results   Component Value Date    WBC 4.68 05/22/2025    HGB 13.2 (L) 05/22/2025    HCT 39.2 (L) 05/22/2025     (H) 05/22/2025     05/22/2025       Lab Results   Component Value Date     05/22/2025    K 3.8 05/22/2025     05/22/2025    CO2 25 05/22/2025    BUN 17 05/22/2025    CREATININE 1.2 05/22/2025    ALBUMIN 3.9 05/22/2025    BILITOT 0.7 05/22/2025    ALKPHOS 46 05/22/2025    AST 34 05/22/2025    ALT 23 05/22/2025     Positive. BCR/ABL1 p210 mRNA transcripts were detected and   estimated to represent  0.04% of total ABL1 (%BCR/ABL1(p210):ABL1) (MR 3.4).         Assessment and Plan:   Romeo Couch Jr. (Romeo) is a pleasant 55 y.o.male with history of CML.    CML  --Diagnosed in 2019  --Began Imatinib 400mg daily on Sat May 11th, with a goal of of less that 10% BCR-ABL at 6 months  --Increased Imatinib to 600mg daily in November 2021 with a decrease in BCR-ABL from 60% to 28% to 19% to 10% to 0.2%   --Currently 0.06%  --Will check labs q 3 months    2. Diverticulitis  --EGD and Colonoscopy planned  --Will defer management to GI  --No further flares         Follow Up:      I have provided the patient with an opportunity to ask questions and have all questions answered to his satisfaction.     Visit today included increased complexity associated with the care of the episodic problem CML addressed and managing the longitudinal care of the patient due to the serious and/or complex managed problem(s) CML.    she will return to clinic in 3 months with labs, but knows to call in the interim if symptoms change or should a problem arise.      Catalina Rucker MD  Hematology and Medical Oncology  Bone Marrow Transplant  [facility]        BMT Chart Routing      Follow up with physician 3 months. 1. labs close to home in 3 months: cbc,cmp, bcr-al p210 2.see me 1 week after labs   Follow up with RAN    Provider visit type    Infusion scheduling note    Injection scheduling note    Labs    Imaging    Pharmacy appointment    Other referrals

## 2025-06-02 DIAGNOSIS — C92.10 CML (CHRONIC MYELOCYTIC LEUKEMIA): ICD-10-CM

## 2025-06-02 RX ORDER — IMATINIB MESYLATE 100 MG/1
600 TABLET, FILM COATED ORAL DAILY
Qty: 180 TABLET | Refills: 11 | Status: ACTIVE | OUTPATIENT
Start: 2025-06-02

## 2025-07-11 ENCOUNTER — OFFICE VISIT (OUTPATIENT)
Dept: FAMILY MEDICINE | Facility: CLINIC | Age: 55
End: 2025-07-11
Payer: COMMERCIAL

## 2025-07-11 VITALS
DIASTOLIC BLOOD PRESSURE: 70 MMHG | HEART RATE: 83 BPM | BODY MASS INDEX: 29.36 KG/M2 | SYSTOLIC BLOOD PRESSURE: 130 MMHG | OXYGEN SATURATION: 98 % | HEIGHT: 69 IN | WEIGHT: 198.19 LBS | TEMPERATURE: 98 F

## 2025-07-11 DIAGNOSIS — C92.10 CML (CHRONIC MYELOCYTIC LEUKEMIA): ICD-10-CM

## 2025-07-11 DIAGNOSIS — R10.84 GENERALIZED ABDOMINAL PAIN: Primary | ICD-10-CM

## 2025-07-11 DIAGNOSIS — R19.7 DIARRHEA, UNSPECIFIED TYPE: ICD-10-CM

## 2025-07-11 PROCEDURE — 99999 PR PBB SHADOW E&M-EST. PATIENT-LVL V: CPT | Mod: PBBFAC,,,

## 2025-07-11 NOTE — PATIENT INSTRUCTIONS
-Lab work today.   -CT abdomen/pelvis as scheduled.   -Recommend bland diet (handout attached).  -Tylenol 325 to 650 mg every 4 to 6 hours as needed or 1 g every 6 hours as needed for pain 4 g/day.   -Ibuprofen 200 to 400 mg every 4 to 6 hours as needed or 600 to 800 mg every 6 to 8 hours as needed for pain maximum dose: 3.2 g/day.   -If symptoms worsen seek further evaluation at emergency department.

## 2025-07-11 NOTE — PROGRESS NOTES
Subjective:      Romeo Couch Jr. is a 55 y.o. male, patient of Regan Rehman Jr., MD with a PMH listed below.       History of Present Illness    CHIEF COMPLAINT:  Romeo presents today with abdominal pain and diarrhea. Accompanied by wife at visit.     HISTORY OF PRESENT ILLNESS:  He reports generalized abdominal pain that started yesterday, describing it as constant and mild (2/10). Pain is worse with applying pressure, and is accompanied by abdominal bloating. Diarrhea began three days ago, initially watery but now becoming more formed. He experienced mild nausea this morning prior to 6:00 AM but has since resolved.  He denies fever, chills, vomiting, black/dark colored stools, or blood in stool. Denies any recent travel or ingesting any new food items. Denies any treatment at home for symptoms.     GASTROINTESTINAL HISTORY:  He has recurring GI episodes since 2023 typically self resolving in 3-4 days, with increasing frequency recently. Initially suspected seafood as a trigger, particularly boiled crawfish, and has reduced seafood intake accordingly. However, symptoms now occur independently of seafood consumption with no specific pattern or consistent trigger identified. Colonoscopy 2023 noting diverticulosis of sigmoid colon with repeat colonoscopy in 10 years. Upper GI 2023 was normal other than erythematous mucosa in the antrum, site biopsied and revealed + H. Pylori. Denies ever being dx with diverticulitis.     MEDICAL HISTORY:  He was diagnosed with leukemia in April 2019, currently on oral chemo.           Past Medical History:   Diagnosis Date    CML (chronic myeloid leukemia)     Leukocytosis     Pneumothorax     27 yo       Review of Systems   Constitutional:  Negative for chills and fever.   HENT:  Negative for congestion, ear pain, postnasal drip, rhinorrhea, sinus pressure, sinus pain, sneezing and sore throat.    Eyes:  Negative for visual disturbance.   Respiratory:  Negative for cough,  "chest tightness and shortness of breath.    Cardiovascular:  Negative for chest pain, palpitations and leg swelling.   Gastrointestinal:  Positive for abdominal pain (generalized, since yesterday), diarrhea (x 3 days, improving) and nausea (this am but resolved.). Negative for blood in stool, constipation and vomiting.        + bloating   Genitourinary:  Negative for dysuria, frequency, hematuria and urgency.   Musculoskeletal:  Negative for arthralgias and myalgias.   Skin:  Negative for rash.   Neurological:  Negative for dizziness, weakness, numbness and headaches.   Psychiatric/Behavioral:  Negative for confusion and dysphoric mood.           Objective:     Vitals:    07/11/25 1027 07/11/25 1058   BP: (!) 140/74 130/70   BP Location: Left arm Left arm   Patient Position: Sitting Sitting   Pulse: 83    Temp: 98.1 °F (36.7 °C)    TempSrc: Oral    SpO2: 98%    Weight: 89.9 kg (198 lb 3.1 oz)    Height: 5' 9" (1.753 m)           Physical Exam  Vitals reviewed.   Constitutional:       General: He is awake. He is not in acute distress.     Appearance: He is not ill-appearing.   HENT:      Right Ear: External ear normal.      Left Ear: External ear normal.      Nose: No congestion or rhinorrhea.      Mouth/Throat:      Mouth: Mucous membranes are moist.      Pharynx: Oropharynx is clear.   Eyes:      General:         Right eye: No discharge.         Left eye: No discharge.      Conjunctiva/sclera: Conjunctivae normal.      Pupils: Pupils are equal, round, and reactive to light.   Cardiovascular:      Rate and Rhythm: Normal rate and regular rhythm.      Heart sounds: S1 normal and S2 normal.   Pulmonary:      Effort: Pulmonary effort is normal. No respiratory distress.      Breath sounds: Normal breath sounds.   Abdominal:      General: Abdomen is flat. Bowel sounds are normal. There is no distension.      Palpations: Abdomen is soft.      Tenderness: There is generalized abdominal tenderness. There is no right CVA " tenderness, left CVA tenderness, guarding or rebound.   Musculoskeletal:         General: Normal range of motion.      Cervical back: Normal range of motion. No rigidity.      Right lower leg: No edema.      Left lower leg: No edema.   Lymphadenopathy:      Cervical: No cervical adenopathy.   Skin:     General: Skin is warm and dry.      Findings: No rash.   Neurological:      General: No focal deficit present.      Mental Status: He is alert and oriented to person, place, and time.      Gait: Gait normal.   Psychiatric:         Mood and Affect: Mood normal.         Behavior: Behavior normal. Behavior is cooperative.         Thought Content: Thought content normal.         Judgment: Judgment normal.             Assessment:         ICD-10-CM ICD-9-CM   1. Generalized abdominal pain  R10.84 789.07   2. Diarrhea, unspecified type  R19.7 787.91   3. CML (chronic myelocytic leukemia)  C92.10 205.10       Plan:       Generalized abdominal pain  -     CBC auto differential; Future; Expected date: 07/11/2025  -     Comprehensive Metabolic Panel; Future; Expected date: 07/11/2025  -     Cancel: CT Abdomen Pelvis With IV Contrast Routine Oral Contrast; Future; Expected date: 07/11/2025  -     Lipase; Future; Expected date: 07/11/2025  -     CT Abdomen Pelvis With IV Contrast Routine Oral Contrast; Future; Expected date: 07/11/2025  -Chronic recurrent issues sine 2023. No identifiable triggers.   -Hx of diverticulosis and H. Pylori (2023).   -Generalized abdominal pain since yesterday, rated 2/10.   -VSS at visit today.   -Labs and CT abdomen/pelvis ordered.   -Offered Zofran, declined at this time.   -OTC tylenol/ibuprofen PRN pain.   -Recommended bland/BRAT diet at this time. Hand out attached.   -Follow up pending results.   -Advised to seek further evaluation in emergency department if symptoms worsen. Patient verbalized understanding.     Diarrhea, unspecified type  -     CBC auto differential; Future; Expected date:  07/11/2025  -     Comprehensive Metabolic Panel; Future; Expected date: 07/11/2025  -     Cancel: CT Abdomen Pelvis With IV Contrast Routine Oral Contrast; Future; Expected date: 07/11/2025  -     Lipase; Future; Expected date: 07/11/2025  -     CT Abdomen Pelvis With IV Contrast Routine Oral Contrast; Future; Expected date: 07/11/2025  -Diarrhea x 3 days, improvement in stool consistency today.   -If symptoms not improved by next week, recommend stool cultures. Patient agreeable with treatment plan.   -Recommended bland/BRAT diet. Hand out attached.   -RTC or follow up with PCP if symptoms worsen or not improving with treatment.      CML (chronic myelocytic leukemia)  -Chronic, Dx 2019.   -On oral chemo.   -Followed by Hematology Oncology.     Patient noted to not have PCP, made aware of provider accepting patient at [facility], agreeable to establishing care. Will assist in getting appt.     RTC/ED precautions discussed where applicable.   Encouraged patient/caregiver to let me know if there are any further questions/concerns.   Patient/caretaker agrees with the treatment plan.     No follow-ups on file.      CARLIN Eason-MARCIE  Family Medicine  [facility]     This note was generated with the assistance of ambient listening technology. Verbal consent was obtained by the patient and accompanying visitor(s) for the recording of patient appointment to facilitate this note. I attest to having reviewed and edited the generated note for accuracy, though some syntax or spelling errors may persist. Please contact the author of this note for any clarification.     I spent a total of 47 minutes on the day of the visit.This includes face to face time and non-face to face time preparing to see the patient (eg, review of tests), obtaining and/or reviewing separately obtained history, documenting clinical information in the electronic or other health record, independently interpreting  results and communicating results to the patient/family/caregiver, or care coordinator.

## 2025-07-14 ENCOUNTER — TELEPHONE (OUTPATIENT)
Dept: FAMILY MEDICINE | Facility: CLINIC | Age: 55
End: 2025-07-14
Payer: COMMERCIAL

## 2025-07-14 ENCOUNTER — PATIENT MESSAGE (OUTPATIENT)
Dept: FAMILY MEDICINE | Facility: CLINIC | Age: 55
End: 2025-07-14
Payer: COMMERCIAL

## 2025-07-14 NOTE — TELEPHONE ENCOUNTER
I have spoken with the patients spouse in regards to rescheduling the patients CT scan for a sooner date. The patients spouse states that the patient was seen in the emergency room on Saturday where he completed his CT scan.

## 2025-07-18 ENCOUNTER — TELEPHONE (OUTPATIENT)
Dept: FAMILY MEDICINE | Facility: CLINIC | Age: 55
End: 2025-07-18
Payer: COMMERCIAL

## 2025-07-18 DIAGNOSIS — K52.9 COLITIS: Primary | ICD-10-CM

## 2025-07-18 DIAGNOSIS — R10.84 GENERALIZED ABDOMINAL PAIN: Primary | ICD-10-CM

## 2025-07-18 NOTE — TELEPHONE ENCOUNTER
Referral Coordinator states that the patients referral is placed for Colon/Rectal , but the diagnosis is in for abdominal pain. The diagnosis code as being abdominal pain has the referral coming up as for the patient to see gastrologist.

## 2025-07-21 ENCOUNTER — TELEPHONE (OUTPATIENT)
Dept: FAMILY MEDICINE | Facility: CLINIC | Age: 55
End: 2025-07-21
Payer: COMMERCIAL

## 2025-08-26 ENCOUNTER — TELEPHONE (OUTPATIENT)
Dept: HEMATOLOGY/ONCOLOGY | Facility: CLINIC | Age: 55
End: 2025-08-26
Payer: COMMERCIAL

## 2025-09-04 ENCOUNTER — OFFICE VISIT (OUTPATIENT)
Dept: SURGERY | Facility: CLINIC | Age: 55
End: 2025-09-04
Payer: COMMERCIAL

## 2025-09-04 VITALS
DIASTOLIC BLOOD PRESSURE: 74 MMHG | HEIGHT: 69 IN | HEART RATE: 80 BPM | WEIGHT: 196 LBS | BODY MASS INDEX: 29.03 KG/M2 | SYSTOLIC BLOOD PRESSURE: 130 MMHG

## 2025-09-04 DIAGNOSIS — K52.9 COLITIS, ACUTE: Primary | ICD-10-CM

## 2025-09-04 PROCEDURE — 3008F BODY MASS INDEX DOCD: CPT | Mod: CPTII,S$GLB,, | Performed by: COLON & RECTAL SURGERY

## 2025-09-04 PROCEDURE — 1159F MED LIST DOCD IN RCRD: CPT | Mod: CPTII,S$GLB,, | Performed by: COLON & RECTAL SURGERY

## 2025-09-04 PROCEDURE — 99999 PR PBB SHADOW E&M-EST. PATIENT-LVL IV: CPT | Mod: PBBFAC,,, | Performed by: COLON & RECTAL SURGERY

## 2025-09-04 PROCEDURE — 99203 OFFICE O/P NEW LOW 30 MIN: CPT | Mod: S$GLB,,, | Performed by: COLON & RECTAL SURGERY

## 2025-09-04 PROCEDURE — 3075F SYST BP GE 130 - 139MM HG: CPT | Mod: CPTII,S$GLB,, | Performed by: COLON & RECTAL SURGERY

## 2025-09-04 PROCEDURE — 3078F DIAST BP <80 MM HG: CPT | Mod: CPTII,S$GLB,, | Performed by: COLON & RECTAL SURGERY

## 2025-09-05 ENCOUNTER — TELEPHONE (OUTPATIENT)
Dept: HEMATOLOGY/ONCOLOGY | Facility: CLINIC | Age: 55
End: 2025-09-05
Payer: COMMERCIAL

## (undated) DEVICE — SYR SLIP TIP 10ML SHIELD

## (undated) DEVICE — TRAY BONE MARROW BEK3411

## (undated) DEVICE — DRESSING LEUKOPLAST FLEX 1X3IN

## (undated) DEVICE — SEE MEDLINE ITEM 157128

## (undated) DEVICE — NDL SPINAL 20GX3.5 HUB